# Patient Record
Sex: MALE | Race: WHITE | NOT HISPANIC OR LATINO | Employment: OTHER | URBAN - METROPOLITAN AREA
[De-identification: names, ages, dates, MRNs, and addresses within clinical notes are randomized per-mention and may not be internally consistent; named-entity substitution may affect disease eponyms.]

---

## 2017-03-31 ENCOUNTER — TELEPHONE (OUTPATIENT)
Dept: TRANSPLANT | Facility: CLINIC | Age: 62
End: 2017-03-31

## 2017-03-31 NOTE — TELEPHONE ENCOUNTER
CHRISTIANO Stop [facility].  Recent DX of skin cancer.  Clinic appointment in 1 month.  ___________________  Patient repeated back and voice a understanding of orders.Patient will stop [facility] Now and labs on 4/24/17 with clinic appointment with Dr David on 5/1/17.

## 2017-04-24 ENCOUNTER — TELEPHONE (OUTPATIENT)
Dept: TRANSPLANT | Facility: CLINIC | Age: 62
End: 2017-04-24

## 2017-04-24 ENCOUNTER — DOCUMENTATION ONLY (OUTPATIENT)
Dept: TRANSPLANT | Facility: CLINIC | Age: 62
End: 2017-04-24

## 2017-04-24 LAB
EXT ALBUMIN: 3.9
EXT ALKALINE PHOSPHATASE: 71
EXT ALT: 19
EXT AST: 17
EXT BILIRUBIN DIRECT: 0.2 MG/DL
EXT BILIRUBIN TOTAL: 0.5
EXT BUN: 26
EXT CALCIUM: 9.2
EXT CHLORIDE: 113
EXT CO2: 24
EXT CREATININE: 1.61 MG/DL
EXT EOSINOPHIL%: 1.5
EXT GLUCOSE: 111
EXT HEMATOCRIT: 38.1
EXT HEMOGLOBIN: 12
EXT LYMPH%: 30.7
EXT MAGNESIUM: 1.9
EXT MONOCYTES%: 7.4
EXT PHOSPHORUS: 2.9
EXT PLATELETS: 186
EXT POTASSIUM: 4.5
EXT PROT/CREAT RATIO UR: 0.17
EXT SEGS%: 60.4
EXT SODIUM: 142 MMOL/L
EXT TACROLIMUS LVL: 10.4
EXT WBC: 4.6

## 2017-04-24 NOTE — TELEPHONE ENCOUNTER
Patient repeated back and voice a understanding of orders and states he is presently taking Tacrolimus 1mg po q 12.  Patient will repeat level on 4/26/17.

## 2017-04-24 NOTE — TELEPHONE ENCOUNTER
----- Message from Cherelle Cat MD sent at 4/24/2017  3:27 PM CDT -----  Results reviewed, and action is needed: Please repeat tacrolimus (Prograf) within next week and ensure it is drawn 12 hour after last dose for accuracy.

## 2017-04-26 ENCOUNTER — DOCUMENTATION ONLY (OUTPATIENT)
Dept: TRANSPLANT | Facility: CLINIC | Age: 62
End: 2017-04-26

## 2017-04-26 LAB — EXT TACROLIMUS LVL: 11.4

## 2017-04-26 NOTE — PROGRESS NOTES
Results reviewed, and action is needed: Please decrease Prograf/tacrolimus to 1 mg BID. Repeat level in 2-3 weeks.

## 2017-04-27 DIAGNOSIS — Z94.0 KIDNEY REPLACED BY TRANSPLANT: Primary | ICD-10-CM

## 2017-04-27 RX ORDER — TACROLIMUS 0.5 MG/1
0.5 CAPSULE ORAL EVERY 12 HOURS
Qty: 60 CAPSULE | Refills: 11 | Status: SHIPPED | OUTPATIENT
Start: 2017-04-27 | End: 2017-08-02 | Stop reason: DRUGHIGH

## 2017-04-27 NOTE — TELEPHONE ENCOUNTER
Patient repeated back and voice a understanding of orders. Patient sates he has gabriele taking Tacrolimus 1mg po BID x several months decrease by [place] provider. Re reviewed with Dr paniagua and orders received to decrease Tacrolimus to 0.5mg po BID.Patient repeated back and voice a understanding of orders.Patient will repeat labs 5/9/17.

## 2017-04-27 NOTE — TELEPHONE ENCOUNTER
----- Message from Cherelle Cat MD sent at 4/26/2017  5:44 PM CDT -----  Results reviewed, and action is needed: Please decrease Prograf/tacrolimus to 1 mg BID. Repeat level in 2-3 weeks.

## 2017-05-01 ENCOUNTER — OFFICE VISIT (OUTPATIENT)
Dept: TRANSPLANT | Facility: CLINIC | Age: 62
End: 2017-05-01
Payer: MEDICARE

## 2017-05-01 VITALS
TEMPERATURE: 98 F | WEIGHT: 180.31 LBS | SYSTOLIC BLOOD PRESSURE: 118 MMHG | BODY MASS INDEX: 28.98 KG/M2 | HEIGHT: 66 IN | RESPIRATION RATE: 16 BRPM | OXYGEN SATURATION: 97 % | DIASTOLIC BLOOD PRESSURE: 70 MMHG | HEART RATE: 70 BPM

## 2017-05-01 DIAGNOSIS — Z79.899 IMMUNOSUPPRESSIVE MANAGEMENT ENCOUNTER FOLLOWING KIDNEY TRANSPLANT: ICD-10-CM

## 2017-05-01 DIAGNOSIS — Z94.0 IMMUNOSUPPRESSIVE MANAGEMENT ENCOUNTER FOLLOWING KIDNEY TRANSPLANT: ICD-10-CM

## 2017-05-01 DIAGNOSIS — N18.30 CKD (CHRONIC KIDNEY DISEASE) STAGE 3, GFR 30-59 ML/MIN: Primary | ICD-10-CM

## 2017-05-01 DIAGNOSIS — Z94.0 DECEASED-DONOR KIDNEY TRANSPLANT: Chronic | ICD-10-CM

## 2017-05-01 DIAGNOSIS — C44.99 RECURRENT SKIN CANCER: Chronic | ICD-10-CM

## 2017-05-01 DIAGNOSIS — Z29.89 NEED FOR PROPHYLACTIC IMMUNOTHERAPY: Chronic | ICD-10-CM

## 2017-05-01 PROCEDURE — 99213 OFFICE O/P EST LOW 20 MIN: CPT | Mod: PBBFAC | Performed by: INTERNAL MEDICINE

## 2017-05-01 PROCEDURE — 99215 OFFICE O/P EST HI 40 MIN: CPT | Mod: S$PBB,,, | Performed by: INTERNAL MEDICINE

## 2017-05-01 PROCEDURE — 99999 PR PBB SHADOW E&M-EST. PATIENT-LVL III: CPT | Mod: PBBFAC,,, | Performed by: INTERNAL MEDICINE

## 2017-05-01 NOTE — MR AVS SNAPSHOT
[facility]  [address]  [facility] [address]  Phone: [phone]                  Raymundo Restrepo   2017 2:00 PM   Office Visit    Description:  Male : 1955   Provider:  Cherelle Cat MD   Department:  [facility]           Diagnoses this Visit        Comments    CKD (chronic kidney disease) stage 3, GFR 30-59 ml/min    -  Primary     -donor kidney transplant         Need for prophylactic immunotherapy         Immunosuppressive management encounter following kidney transplant         Recurrent skin cancer                To Do List           Goals (5 Years of Data)     None      [facility]     [facility] -  Assistance  Unless otherwise directed by your provider, please contact [facility], our nurse care line that is available for  assistance.     Registered nurses in the [facility] On Call Center provide: appointment scheduling, clinical advisement, health education, and other advisory services.  Call: [phone] (toll free)               Medications           Message regarding Medications     Verify the changes and/or additions to your medication regime listed below are the same as discussed with your clinician today.  If any of these changes or additions are incorrect, please notify your healthcare provider.             Verify that the below list of medications is an accurate representation of the medications you are currently taking.  If none reported, the list may be blank. If incorrect, please contact your healthcare provider. Carry this list with you in case of emergency.           Current Medications     allopurinol (ZYLOPRIM) 100 MG tablet Take 300 mg by mouth once daily.     benazepril (LOTENSIN) 5 MG tablet Take 1 tablet by mouth Daily.    carvedilol (COREG) 6.25 MG tablet Take 12.5 mg by mouth Twice daily.     clopidogrel (PLAVIX) 75 mg tablet Take 1 tablet by mouth Daily.    multivitamin (THERAGRAN)  "per tablet Take 1 tablet by mouth Daily.    pantoprazole (PROTONIX) 40 MG tablet Take 1 tablet by mouth Daily.    sodium bicarbonate 650 MG tablet Take 650 mg by mouth 2 (two) times daily.    tacrolimus (PROGRAF) 0.5 MG Cap Take 1 capsule (0.5 mg total) by mouth every 12 (twelve) hours.           Clinical Reference Information           Your Vitals Were     BP Pulse Temp Resp Height Weight    118/70 (BP Location: Right arm, Patient Position: Sitting, BP Method: Automatic) 70 97.8 °F (36.6 °C) (Oral) 16 5' 6" (1.676 m) 81.8 kg (180 lb 5.4 oz)    SpO2 BMI             97% 29.11 kg/m2         Blood Pressure          Most Recent Value    BP  118/70      Allergies as of 5/1/2017     Morphine    Iodine      Immunizations Administered on Date of Encounter - 5/1/2017     None      Maintenance Dialysis History     Patient has no recorded history of maintenance dialysis.      Transplant Information        Txp Date Organ Coordinator Care Team    8/28/2010 Kidney Du Monet RN Surgeon:  Kenyon Monet MD   Referring Physician:  Cahd Mayer MD   Current Nephrologist:  Jean Crump MD         [facility] Sign-Up     Activating your [facility] account is as easy as 1-2-3!     1) Visit [email or web address], select Sign Up Now, enter this activation code and your date of birth, then select Next.  [email or web address]  Expires: 6/15/2017  2:46 PM      2) Create a username and password to use when you visit [facility] in the future and select a security question in case you lose your password and select Next.    3) Enter your e-mail address and click Sign Up!    Additional Information  If you have questions, please e-mail [email or web address] or call [phone] to talk to our [facility] staff. Remember, [facility] is NOT to be used for urgent needs. For medical emergencies, dial 911.         Instructions    From Dr. Rea:  It is my privilege to participate in your post-transplant care!  Please be sure to let us know if you have " any questions or concerns about your health care - we cannot help you if we do not know.  Don't forget we are on call 24/7 for any emergencies.   Best Wishes,  Dr. Rona David Coit      Everolimus tablets  What is this medicine?  EVEROLIMUS (leona BE li mus) decreases the activity of your immune system. Afinitor is used to treat certain types of cancer. Zortress is used for kidney and liver transplant rejection prophylaxis.  How should I use this medicine?  Take this medicine by mouth with a full glass of water. Follow the directions on the prescription label. You can take this medicine with or without food, but always take Zortress the same way. Do not cut, crush, or chew this medicine. Do not take with grapefruit juice. Take your medicine at regular intervals. Do not take it more often than directed. Do not stop taking except on your doctor's advice.  Talk to your pediatrician regarding the use of this medicine in children. Special care may be needed.  What side effects may I notice from receiving this medicine?  Side effects that you should report to your doctor or health care professional as soon as possible:  · allergic reactions like skin rash, itching or hives, swelling of the face, lips, or tongue  · breathing problems  · chest pain  · cough  · dark urine  · fever or chills, sore throat  · increased hunger or thirst  · increased urination  · nausea, vomiting  · stomach pain  · swelling of ankles, feet, hands  · trouble passing urine or change in the amount of urine  · unusual bleeding or bruising  · unusually weak or tired  Side effects that usually do not require medical attention (Report these to your doctor or health care professional if they continue or are bothersome.):  · constipation  · diarrhea  · dizziness  · dry mouth or mouth sores  · headache  · nausea, vomiting  What may interact with this medicine?  This medicine may interact with the following medications:  · antiviral medicines for HIV or  AIDS  · aprepitant  · carbamazepine  · certain medicines for cholesterol like simvastatin  · clarithromycin  · cyclosporine  · dexamethasone  · diltiazem  · erythromycin  · fluconazole  · grapefruit juice  · itraconazole  · ketoconazole  · live vaccines  · nefazodone  · nicardipine  · phenobarbital  · phenytoin  · rifabutin  · rifampin  · telithromycin  · verapamil  · voriconazole  What if I miss a dose?  If you miss a dose, take it as soon as you can. If it is almost time for your next dose, take only that dose. Do not take double or extra doses.  Where should I keep my medicine?  Keep out of the reach of children.  Store at room temperature between 15 and 30 degrees C (59 and 86 degrees F). Throw away any unused medicine after the expiration date.  What should I tell my health care provider before I take this medicine?  They need to know if you have any of these conditions:  · diabetes  · heart disease  · high cholesterol  · immune system problems  · infection (especially a virus infection such as chickenpox, cold sores, or herpes)  · kidney disease  · liver disease  · low blood counts, like low white cell, platelet, or red cell counts  · rare hereditary problems of galactose intolerance, the Fortunato lactase deficiency, or glucose-galactose malabsorption  · an unusual or allergic reaction to everolimus, other medicines, foods, dyes, or preservatives  · pregnant or trying to get pregnant  · breast-feeding  What should I watch for while using this medicine?  This drug may make you feel generally unwell. This is not uncommon, as chemotherapy can affect healthy cells as well as cancer cells. Report any side effects. Continue your course of treatment even though you feel ill unless your doctor tells you to stop. Visit your doctor or health care professional for regular check-ups. You may need regular tests to monitor possible side effects of the drug.  This medicine may affect blood sugar levels. If you have diabetes,  check with your doctor or health care professional before you change your diet or the dose of your diabetic medicine.  Do not become pregnant while taking this medicine or for 8 weeks after stopping it. Women should inform their doctor if they wish to become pregnant or think they might be pregnant. There is a potential for serious side effects to an unborn child. Talk to your health care professional or pharmacist for more information. Do not breast-feed an infant while taking this medicine or for 2 weeks after stopping it.  This medicine has caused ovarian failure in some women. This medicine may interfere with the ability to have a child. You should talk with your doctor or health care professional if you are concerned about your fertility.  This medicine has caused reduced sperm counts in some men. This may interfere with the ability to father a child. You should talk to your doctor or health care professional if you are concerned about your fertility.  Call your doctor or health care professional for advice if you get a fever, chills or sore throat, or other symptoms of a cold or flu. Do not treat yourself. This drug decreases your body's ability to fight infections. Try to avoid being around people who are sick.  This medicine may increase your risk to bruise or bleed. Call your doctor or health care professional if you notice any unusual bleeding.  If you have had a kidney transplant, immediately tell your doctor if your incision site is red, warm, or painful. Also, tell your doctor if your incision site opens up or swells or if contains blood, fluid, or pus.  Keep out of the sun. If you cannot avoid being in the sun, wear protective clothing and use sunscreen. Do not use sun lamps or tanning beds/booths.  Date Last Reviewed:   NOTE:This sheet is a summary. It may not cover all possible information. If you have questions about this medicine, talk to your doctor, pharmacist, or health care provider. Copyright©  2016 [employer]             Language Assistance Services     ATTENTION: Language assistance services are available, free of charge. Please call [phone].      ATENCIÓN: Si habla li, tiene a erazo disposición servicios gratuitos de asistencia lingüística. Llame al [phone].     CHÚ Ý: N?u b?n nói Ti?ng Vi?t, có các d?ch v? h? tr? ngôn ng? mi?n phí dành cho b?n. G?i s? [phone].         [facility] complies with applicable Federal civil rights laws and does not discriminate on the basis of race, color, national origin, age, disability, or sex.

## 2017-05-01 NOTE — PATIENT INSTRUCTIONS
From Dr. Rea:  It is my privilege to participate in your post-transplant care!  Please be sure to let us know if you have any questions or concerns about your health care - we cannot help you if we do not know.  Don't forget we are on call 24/7 for any emergencies.   Best Wishes,  Dr. Rona David Coit      Everolimus tablets  What is this medicine?  EVEROLIMUS (leona BE li mus) decreases the activity of your immune system. Afinitor is used to treat certain types of cancer. Zortress is used for kidney and liver transplant rejection prophylaxis.  How should I use this medicine?  Take this medicine by mouth with a full glass of water. Follow the directions on the prescription label. You can take this medicine with or without food, but always take Zortress the same way. Do not cut, crush, or chew this medicine. Do not take with grapefruit juice. Take your medicine at regular intervals. Do not take it more often than directed. Do not stop taking except on your doctor's advice.  Talk to your pediatrician regarding the use of this medicine in children. Special care may be needed.  What side effects may I notice from receiving this medicine?  Side effects that you should report to your doctor or health care professional as soon as possible:  · allergic reactions like skin rash, itching or hives, swelling of the face, lips, or tongue  · breathing problems  · chest pain  · cough  · dark urine  · fever or chills, sore throat  · increased hunger or thirst  · increased urination  · nausea, vomiting  · stomach pain  · swelling of ankles, feet, hands  · trouble passing urine or change in the amount of urine  · unusual bleeding or bruising  · unusually weak or tired  Side effects that usually do not require medical attention (Report these to your doctor or health care professional if they continue or are bothersome.):  · constipation  · diarrhea  · dizziness  · dry mouth or mouth sores  · headache  · nausea, vomiting  What may  interact with this medicine?  This medicine may interact with the following medications:  · antiviral medicines for HIV or AIDS  · aprepitant  · carbamazepine  · certain medicines for cholesterol like simvastatin  · clarithromycin  · cyclosporine  · dexamethasone  · diltiazem  · erythromycin  · fluconazole  · grapefruit juice  · itraconazole  · ketoconazole  · live vaccines  · nefazodone  · nicardipine  · phenobarbital  · phenytoin  · rifabutin  · rifampin  · telithromycin  · verapamil  · voriconazole  What if I miss a dose?  If you miss a dose, take it as soon as you can. If it is almost time for your next dose, take only that dose. Do not take double or extra doses.  Where should I keep my medicine?  Keep out of the reach of children.  Store at room temperature between 15 and 30 degrees C (59 and 86 degrees F). Throw away any unused medicine after the expiration date.  What should I tell my health care provider before I take this medicine?  They need to know if you have any of these conditions:  · diabetes  · heart disease  · high cholesterol  · immune system problems  · infection (especially a virus infection such as chickenpox, cold sores, or herpes)  · kidney disease  · liver disease  · low blood counts, like low white cell, platelet, or red cell counts  · rare hereditary problems of galactose intolerance, the Fortunato lactase deficiency, or glucose-galactose malabsorption  · an unusual or allergic reaction to everolimus, other medicines, foods, dyes, or preservatives  · pregnant or trying to get pregnant  · breast-feeding  What should I watch for while using this medicine?  This drug may make you feel generally unwell. This is not uncommon, as chemotherapy can affect healthy cells as well as cancer cells. Report any side effects. Continue your course of treatment even though you feel ill unless your doctor tells you to stop. Visit your doctor or health care professional for regular check-ups. You may need regular  tests to monitor possible side effects of the drug.  This medicine may affect blood sugar levels. If you have diabetes, check with your doctor or health care professional before you change your diet or the dose of your diabetic medicine.  Do not become pregnant while taking this medicine or for 8 weeks after stopping it. Women should inform their doctor if they wish to become pregnant or think they might be pregnant. There is a potential for serious side effects to an unborn child. Talk to your health care professional or pharmacist for more information. Do not breast-feed an infant while taking this medicine or for 2 weeks after stopping it.  This medicine has caused ovarian failure in some women. This medicine may interfere with the ability to have a child. You should talk with your doctor or health care professional if you are concerned about your fertility.  This medicine has caused reduced sperm counts in some men. This may interfere with the ability to father a child. You should talk to your doctor or health care professional if you are concerned about your fertility.  Call your doctor or health care professional for advice if you get a fever, chills or sore throat, or other symptoms of a cold or flu. Do not treat yourself. This drug decreases your body's ability to fight infections. Try to avoid being around people who are sick.  This medicine may increase your risk to bruise or bleed. Call your doctor or health care professional if you notice any unusual bleeding.  If you have had a kidney transplant, immediately tell your doctor if your incision site is red, warm, or painful. Also, tell your doctor if your incision site opens up or swells or if contains blood, fluid, or pus.  Keep out of the sun. If you cannot avoid being in the sun, wear protective clothing and use sunscreen. Do not use sun lamps or tanning beds/booths.  Date Last Reviewed:   NOTE:This sheet is a summary. It may not cover all possible  information. If you have questions about this medicine, talk to your doctor, pharmacist, or health care provider. Copyright© 2016 [employer]

## 2017-05-01 NOTE — LETTER
May 1, 2017        Jean Crump  [address]  [place] [address]  Phone: [phone]  Fax: [phone]             [facility]  [address]  [facility] [address]  Phone: [phone]   Patient: Raymundo Restrepo   MR Number: [id number]   YOB: 1955   Date of Visit: 5/1/2017       Dear Dr. Jean Crump    Thank you for referring Raymundo Restrepo to me for evaluation. Attached you will find relevant portions of my assessment and plan of care.    If you have questions, please do not hesitate to call me. I look forward to following Raymundo Restrepo along with you.    Sincerely,    Cherelle Cat MD    Enclosure    If you would like to receive this communication electronically, please contact [email or web address] or [phone] to request [employer] Link access.    [employer] Link is a tool which provides read-only access to select patient information with whom you have a relationship. Its easy to use and provides real time access to review your patients record including encounter summaries, notes, results, and demographic information.    If you feel you have received this communication in error or would no longer like to receive these types of communications, please e-mail [email or web address]

## 2017-05-01 NOTE — PROGRESS NOTES
Kidney Post-Transplant Assessment    Referring Physician: Chad Mayer  Current Nephrologist: Jean Crump    ORGAN: RIGHT KIDNEY  Donor Type:  - brain death  PHS Increased Risk: no  Cold Ischemia: 814.2 mins  Induction Medications: steroids (prednisone,methylprednisolone,solumedrol,medrol,decadron), campath - alemtuzumab (anti-cd52)    Subjective:     CC:  Reassessment of renal allograft function and management of chronic immunosuppression.    HPI:  Mr. Restrepo is a 62 y.o. year old White male who received a  - brain death kidney transplant on 8/28/10.  He has CKD stage 3 - GFR 30-59 and his baseline creatinine is ~ 1.6s. He takes tacrolimus for maintenance immunosuppression, having recently stopped his mycophenolate given recurrent skin lesions. He denies any recent hospitalizations or ER visits since his previous clinic visit.    Pertinent History: Induced w Campath 30 mg IV intraoperatively & SoluMedrol 875 mg total over 3 days. After transplant he has had coronary in-stent restenosis requiring restenting in . He was also diagnosed with ischemic colitis 2010.  Recurrent skin cancers of various types, reportedly even melanoma, have required multiple skin lesions to be removed.    Raymundo feels well today, but is concerned about his recent discontinuation of CellCept.  On reviewing records it appears that about a month ago his nephrologist contacted our office to report that he is having increased frequency of skin lesions removed.  Based on that information, his CellCept was discontinued.  He reports today that he would rather have more skin lesions removed and risk his kidney transplant.    He otherwise feels well, and has no other complaints. He denies any kidney-related complaints, such as pain over allograft, flank pain, dysuria, frequency, or other LUTS.  Of note, a recent elevated tacrolimus level warranted decreasing his dose to 1 mg twice daily (which was not  "reflected in Epic).  Several days ago he was further reduced to 0.5 mg twice a day.    Review of Systems   Constitutional: Negative.  Negative for fatigue.   HENT: Negative.    Eyes: Negative for visual disturbance.   Respiratory: Negative for shortness of breath.    Cardiovascular: Negative for chest pain.   Gastrointestinal: Negative for abdominal pain.   Genitourinary: Negative for difficulty urinating.   Musculoskeletal: Positive for arthralgias (Fingers). Negative for joint swelling.   Skin: Negative for rash.        See HPI   Allergic/Immunologic: Positive for immunocompromised state.   Neurological: Negative for tremors and headaches.   Hematological: Does not bruise/bleed easily.   Psychiatric/Behavioral: Negative.      Objective:   Blood pressure 118/70, pulse 70, temperature 97.8 °F (36.6 °C), temperature source Oral, resp. rate 16, height 5' 6" (1.676 m), weight 81.8 kg (180 lb 5.4 oz), SpO2 97 %.body mass index is 29.11 kg/(m^2).    Physical Exam   Constitutional: He is oriented to person, place, and time. He appears well-developed and well-nourished.   HENT:   Head: Normocephalic and atraumatic.   Mouth/Throat: No oropharyngeal exudate.   Eyes: Conjunctivae are normal. No scleral icterus.   Neck: Neck supple. No JVD present.   Cardiovascular: Normal rate and regular rhythm.    Pulmonary/Chest: Effort normal and breath sounds normal.   Abdominal: Soft. He exhibits no mass. There is no hepatosplenomegaly. There is no tenderness. There is no CVA tenderness. No hernia.   Musculoskeletal: He exhibits no edema.   Neurological: He is alert and oriented to person, place, and time. Coordination normal.   Skin: Skin is warm and dry. No rash noted.   Multiple well-healed scars noted in both upper extremities, consistent with the removal of prior skin lesions by dermatology.   Psychiatric: He has a normal mood and affect. Judgment normal.     Labs:  Lab Results   Component Value Date    EXTANC  09/08/2014      " Comment:      Coming to Clinic on 9/15/14    EXTWBC 4.6 2017    EXTSEGS 60.4 2017    EXTPLATELETS 186 2017    EXTHEMOGLOBI 12.0 (L) 2017    EXTHEMATOCRI 38.1 (L) 2017    EXTCREATININ 1.61 (H) 2017    EXTSODIUM 142 2017    EXTPOTASSIUM 4.5 2017    EXTBUN 26 (H) 2017    EXTCO2 24 2017    EXTCALCIUM 9.2 2017    EXTPHOSPHORU 2.9 2017    EXTGLUCOSE 111 (H) 2017    EXTALBUMIN 3.9 2017    EXTAST 17 2017    EXTALT 19 2017    EXTBILITOTAL 0.5 2017     Lab Results   Component Value Date    EXTTACROLVL 11.4 2017    EXTPROTCRE 0.17 2017    EXTPROTEINUA neg 2016    EXTWBCUA 1-4 2016    EXTRBCUA 0-2 2016     Labs were reviewed with the patient.    Assessment:     1. CKD (chronic kidney disease) stage 3, GFR 30-59 ml/min    2. -donor kidney transplant 8/28/10    3. Chronic immunosuppression with Prograf and Cellcept    4. Immunosuppressive management encounter following kidney transplant    5. Recurrent skin cancer        Plan:   CKD3 s/p kidney transplantation. Continue to monitor renal function and electrolytes, HTN, secondary hyperparathyroidism and other issues related to underlying ESRD.   Continue tacrolimus-based immunosuppression-await repeat level from 17 to determine if his current dose is acceptable to maintain at trough level of approximately 5.  Will continue to watch signs, symptoms and levels for side effects and drug toxicity.  We had a lengthy discussion regarding his recurrent skin cancers.  He is well aware that immunosuppression can aggravate this, but is anxious to not jeopardize his kidney function.  He was asking if he could restart his CellCept.  I suggested that before we go that route, we should check immune cell function test to determine his net immunosuppression given his tacrolimus dose was reduced recently.  I have also requested we obtain a lipid profile  indicates he is converted to an mTOR inhibitor.    We discussed he needs aggressive resection of the skin lesions, which he now believes his dermatologist is reluctant to pursue given a recent episode of bleeding.  I advised him to discuss this with his dermatologist and perhaps his cardiologist given his Plavix therapy.  I explained that our transplant dermatologists often suggest discontinuing CNI therapy and substituting mTOR inhibitor.  Once these labs are resulted and he has had the opportunity to review some written information provided about everolimus, we can again discuss if he wants to change his immunosuppression.  I reviewed the side effects of this new immunosuppressant and pointed out them more significant ones are hyperlipidemia, neutropenia and poor wound healing.  As such, I strongly encouraged him to have any skin lesions that need attention addressed prior to the conversion, if he opts to pursue this option.  The option to see transplant dermatologist was offered.    Lipid panel and immune cell saw function will be ordered on May 4 along with repeat tacrolimus level.  He is aware with for him to continue holding his CellCept until we get these results back.  During that time, he will consider more aggressive treatment of his skin lesions and whether he wants to try everolimus.  Well over 75% of this visit was spent on counseling, approximately 30 minutes out of the 40 minute visit.    Follow-up:   Annual follow-up with kidney transplant clinic with repeat labs, including renal function panel with UA, urine protein/creatinine ratio, and drug trough level q3 months.  Patient also reminded to follow-up with general nephrologist.    Cherelle Cat MD       Education:   Material provided to the patient.  Patient reminded to call with any health changes since these can be early signs of significant complications.  Also, I advised the patient to be sure any new medications or changes of old  medications are discussed with either a pharmacist or physician knowledgeable with transplant to avoid rejection/drug toxicity related to significant drug interactions.    UNOS Patient Status  Functional Status: 80% - Normal activity with effort: some symptoms of disease  Physical Capacity: No Limitations

## 2017-05-09 LAB
EXT ALBUMIN: 3.9
EXT BUN: 25
EXT CALCIUM: 9
EXT CHLORIDE: 115
EXT CHOLESTEROL: 138
EXT CO2: 23
EXT CREATININE: 1.71 MG/DL
EXT EOSINOPHIL%: 1.8
EXT GFR MDRD NON AF AMER: 41
EXT GLUCOSE: 111
EXT HDL: 24
EXT HEMATOCRIT: 37.7
EXT HEMOGLOBIN: 11.8
EXT IMMUNKNOW (STIMULATED): 148
EXT LDL CHOLESTEROL: 83
EXT LYMPH%: 30.8
EXT MAGNESIUM: 2
EXT MONOCYTES%: 8.4
EXT PLATELETS: 163
EXT POTASSIUM: 4.8
EXT SEGS%: 59
EXT SODIUM: 141 MMOL/L
EXT TACROLIMUS LVL: 4.1
EXT TRIGLYCERIDES: 155
EXT WBC: 3.8

## 2017-05-11 ENCOUNTER — DOCUMENTATION ONLY (OUTPATIENT)
Dept: TRANSPLANT | Facility: CLINIC | Age: 62
End: 2017-05-11

## 2017-08-02 ENCOUNTER — DOCUMENTATION ONLY (OUTPATIENT)
Dept: TRANSPLANT | Facility: CLINIC | Age: 62
End: 2017-08-02

## 2017-08-02 DIAGNOSIS — Z94.0 KIDNEY REPLACED BY TRANSPLANT: Primary | ICD-10-CM

## 2017-08-02 LAB
EXT ALBUMIN: 3.8
EXT ALKALINE PHOSPHATASE: 72
EXT ALT: 15
EXT AST: 18
EXT BILIRUBIN DIRECT: 0.2 MG/DL
EXT BILIRUBIN TOTAL: 0.6
EXT BUN: 29
EXT CALCIUM: 9.2
EXT CHLORIDE: 113
EXT CO2: 22
EXT CREATININE: 1.59 MG/DL
EXT EOSINOPHIL%: 2.4
EXT GFR MDRD NON AF AMER: 44
EXT GLUCOSE: 116
EXT HEMATOCRIT: 36.9
EXT HEMOGLOBIN: 11.4
EXT LYMPH%: 30.8
EXT MAGNESIUM: 2.1
EXT MONOCYTES%: 8.5
EXT PHOSPHORUS: 3.4
EXT PLATELETS: 184
EXT POTASSIUM: 4.5
EXT PROT/CREAT RATIO UR: 0.08
EXT SEGS%: 58.3
EXT SODIUM: 140 MMOL/L
EXT TACROLIMUS LVL: <1.5
EXT WBC: 5

## 2017-08-02 RX ORDER — TACROLIMUS 1 MG/1
1 CAPSULE ORAL EVERY 12 HOURS
COMMUNITY
Start: 2017-04-14 | End: 2017-08-02 | Stop reason: SDUPTHER

## 2017-08-02 RX ORDER — TACROLIMUS 1 MG/1
3 CAPSULE ORAL EVERY 12 HOURS
Qty: 180 CAPSULE | Refills: 11 | Status: SHIPPED | OUTPATIENT
Start: 2017-08-02 | End: 2017-08-09 | Stop reason: SDUPTHER

## 2017-08-02 NOTE — PROGRESS NOTES
Results reviewed, and action is needed: Please increase Prograf/tacrolimus to 3 mg BID. Repeat labs Friday.

## 2017-08-02 NOTE — TELEPHONE ENCOUNTER
VORB Increase Tacrolimus to 3mg po q 12 hours.  8/2/17 labs reviewed.  __________________  Patient repeated back and voice a understanding of orders. 12 hour trough reviewed. Patient to repeat labs on 8/4/17.

## 2017-08-04 ENCOUNTER — DOCUMENTATION ONLY (OUTPATIENT)
Dept: TRANSPLANT | Facility: CLINIC | Age: 62
End: 2017-08-04

## 2017-08-04 ENCOUNTER — TELEPHONE (OUTPATIENT)
Dept: TRANSPLANT | Facility: CLINIC | Age: 62
End: 2017-08-04

## 2017-08-04 LAB
EXT BUN: 30
EXT CALCIUM: 9.8
EXT CHLORIDE: 116
EXT CO2: 18
EXT CREATININE: 1.53 MG/DL
EXT GFR MDRD NON AF AMER: 47
EXT GLUCOSE: 112
EXT MAGNESIUM: 1.8
EXT PHOSPHORUS: 2.9
EXT POTASSIUM: 4.3
EXT SODIUM: 140 MMOL/L
EXT TACROLIMUS LVL: 3.8

## 2017-08-04 NOTE — TELEPHONE ENCOUNTER
----- Message from Cherelle Cat MD sent at 8/4/2017  2:11 PM CDT -----  Results reviewed, and action is needed: tacro better; repeat next week. Acidosis inconsistent with prior lab thus will repeat before making med changes as suspect processing issue.

## 2017-08-04 NOTE — PROGRESS NOTES
Results reviewed, and action is needed: tacro better; repeat next week. Acidosis inconsistent with prior lab thus will repeat before making med changes as suspect processing issue.

## 2017-08-04 NOTE — TELEPHONE ENCOUNTER
Patient repeated back and voice a understanding of orders. Patient to repeat fasting labs on 8/7/17.Patient voice a understanding of medication compliance.

## 2017-08-07 LAB
EXT ALBUMIN: 3.7
EXT BUN: 29
EXT CALCIUM: 9.1
EXT CHLORIDE: 117
EXT CO2: 20
EXT CREATININE: 1.74 MG/DL
EXT GFR MDRD AF AMER: 49
EXT GFR MDRD NON AF AMER: 40
EXT GLUCOSE: 117
EXT MAGNESIUM: 1.7
EXT PHOSPHORUS: 2.6
EXT POTASSIUM: 4.3
EXT SODIUM: 142 MMOL/L
EXT TACROLIMUS LVL: 8.5

## 2017-08-08 ENCOUNTER — DOCUMENTATION ONLY (OUTPATIENT)
Dept: TRANSPLANT | Facility: CLINIC | Age: 62
End: 2017-08-08

## 2017-08-09 ENCOUNTER — OFFICE VISIT (OUTPATIENT)
Dept: TRANSPLANT | Facility: CLINIC | Age: 62
End: 2017-08-09
Payer: MEDICARE

## 2017-08-09 VITALS
WEIGHT: 174.63 LBS | RESPIRATION RATE: 18 BRPM | OXYGEN SATURATION: 100 % | TEMPERATURE: 98 F | BODY MASS INDEX: 28.06 KG/M2 | SYSTOLIC BLOOD PRESSURE: 140 MMHG | HEART RATE: 69 BPM | HEIGHT: 66 IN | DIASTOLIC BLOOD PRESSURE: 80 MMHG

## 2017-08-09 DIAGNOSIS — Z94.0 KIDNEY REPLACED BY TRANSPLANT: Primary | ICD-10-CM

## 2017-08-09 DIAGNOSIS — Z94.0 DECEASED-DONOR KIDNEY TRANSPLANT: Primary | Chronic | ICD-10-CM

## 2017-08-09 DIAGNOSIS — Z94.0 KIDNEY REPLACED BY TRANSPLANT: ICD-10-CM

## 2017-08-09 DIAGNOSIS — Q61.3 PKD (POLYCYSTIC KIDNEY DISEASE): ICD-10-CM

## 2017-08-09 DIAGNOSIS — I15.0 RENOVASCULAR HYPERTENSION: Chronic | ICD-10-CM

## 2017-08-09 DIAGNOSIS — Z29.89 NEED FOR PROPHYLACTIC IMMUNOTHERAPY: Chronic | ICD-10-CM

## 2017-08-09 DIAGNOSIS — C43.9 MALIGNANT MELANOMA, UNSPECIFIED SITE: Chronic | ICD-10-CM

## 2017-08-09 DIAGNOSIS — C44.99 RECURRENT SKIN CANCER: Chronic | ICD-10-CM

## 2017-08-09 DIAGNOSIS — N18.2 CKD (CHRONIC KIDNEY DISEASE) STAGE 2, GFR 60-89 ML/MIN: ICD-10-CM

## 2017-08-09 PROCEDURE — 3077F SYST BP >= 140 MM HG: CPT | Mod: ,,, | Performed by: NURSE PRACTITIONER

## 2017-08-09 PROCEDURE — 99215 OFFICE O/P EST HI 40 MIN: CPT | Mod: PBBFAC | Performed by: NURSE PRACTITIONER

## 2017-08-09 PROCEDURE — 99999 PR PBB SHADOW E&M-EST. PATIENT-LVL V: CPT | Mod: PBBFAC,,, | Performed by: NURSE PRACTITIONER

## 2017-08-09 PROCEDURE — 3079F DIAST BP 80-89 MM HG: CPT | Mod: ,,, | Performed by: NURSE PRACTITIONER

## 2017-08-09 PROCEDURE — 99214 OFFICE O/P EST MOD 30 MIN: CPT | Mod: S$PBB,,, | Performed by: NURSE PRACTITIONER

## 2017-08-09 RX ORDER — ASPIRIN 325 MG
325 TABLET ORAL DAILY
COMMUNITY
End: 2018-08-13

## 2017-08-09 RX ORDER — TACROLIMUS 1 MG/1
CAPSULE ORAL
Qty: 210 CAPSULE | Refills: 11 | Status: SHIPPED | OUTPATIENT
Start: 2017-08-09 | End: 2017-08-17 | Stop reason: SDUPTHER

## 2017-08-09 RX ORDER — MYCOPHENOLATE MOFETIL 250 MG/1
500 CAPSULE ORAL 2 TIMES DAILY
COMMUNITY
End: 2017-08-09 | Stop reason: SDUPTHER

## 2017-08-09 RX ORDER — MYCOPHENOLATE MOFETIL 250 MG/1
250 CAPSULE ORAL 2 TIMES DAILY
Qty: 60 CAPSULE | Refills: 11 | Status: SHIPPED | OUTPATIENT
Start: 2017-08-09 | End: 2018-08-13 | Stop reason: SDUPTHER

## 2017-08-09 NOTE — LETTER
August 9, 2017        Jean Crump  [address]  [place] [address]  Phone: [phone]  Fax: [phone]             [facility]  [address]  [facility] [address]  Phone: [phone]   Patient: Raymundo Restrepo   MR Number: [id number]   YOB: 1955   Date of Visit: 8/9/2017       Dear Dr. Jean Crump    Thank you for referring Raymundo Restrepo to me for evaluation. Attached you will find relevant portions of my assessment and plan of care.    If you have questions, please do not hesitate to call me. I look forward to following Raymundo Restrepo along with you.    Sincerely,    Krystle Paul, NP    Enclosure    If you would like to receive this communication electronically, please contact [email or web address] or [phone] to request [employer] Link access.    [employer] Link is a tool which provides read-only access to select patient information with whom you have a relationship. Its easy to use and provides real time access to review your patients record including encounter summaries, notes, results, and demographic information.    If you feel you have received this communication in error or would no longer like to receive these types of communications, please e-mail [email or web address]

## 2017-08-09 NOTE — PROGRESS NOTES
"   Kidney Post-Transplant Assessment    Referring Physician: Chad Mayer  Current Nephrologist: Jean Crump    ORGAN: RIGHT KIDNEY  Donor Type:  - brain death  PHS Increased Risk: no  Cold Ischemia: 814.2 mins  Induction Medications: steroids (prednisone,methylprednisolone,solumedrol,medrol,decadron), campath - alemtuzumab (anti-cd52)    Subjective:     CC:  Reassessment of renal allograft function and management of chronic immunosuppression.    HPI:  Mr. Restrepo is a 62 y.o. year old White male who received a  - brain death kidney transplant on 8/28/10.  He has CKD stage 2 - GFR 60-89 and his baseline creatinine is between  1.2-1.5. He takes prednisone and tacrolimus for maintenance immunosuppression. MMF was stopped in 3/2017 d/t skin cancer/ recurrent melanoma, but the patient reports he restarted it 2 weeks later and has been on  mg BID.  He follows up with dermatology in [place]. He reports  He has had melanoma lesions removed from his arms and has active lesions to the face that could not be removed d/t bleeding and Plavix use. He f/u with dermatology/ Dr Pineda  3x/year.     He denies any recent hospitalizations or ER visits since his previous clinic visit.  Also reports having massive chest pain 3 days ago. He took an aspirin and felt better. He f/u with a cardiologist Dr Koroma and next apt scheduled for 10/2017  He also reports a bradycardic episode in 2017 that almost result in getting "shocked."  He does not have chest pain today    Past Medical History:   Diagnosis Date    CAD (coronary artery disease), native coronary artery      Stent placed 2007 In-stent restenosis, required a 2nd stent 2010    -donor kidney transplant 2010    Gross hematuria     history of gross hematuria    Hiatal hernia     Hypertension 9/15/2014    Ischemic colitis     Kidney stones     Need for prophylactic immunotherapy     PKD (polycystic kidney " "disease)     Recurrent skin cancer     Renal hypertension     Ruptured cyst of kidney     Seizure     Skin cancer     TIA (transient ischemic attack)        Review of Systems   Constitutional: Negative for activity change, appetite change, chills, fatigue, fever and unexpected weight change.   HENT: Negative for congestion, facial swelling, postnasal drip, rhinorrhea, sinus pressure, sore throat and trouble swallowing.    Eyes: Negative for pain, redness and visual disturbance.   Respiratory: Negative for cough, chest tightness, shortness of breath and wheezing.    Cardiovascular: Positive for chest pain. Negative for palpitations and leg swelling.        Chest pain 3 days ago   Gastrointestinal: Negative for abdominal pain, diarrhea, nausea and vomiting.   Genitourinary: Negative for dysuria, flank pain and urgency.   Musculoskeletal: Negative for gait problem, neck pain and neck stiffness.   Skin: Negative for rash.        Recurrent melanoma    Allergic/Immunologic: Positive for immunocompromised state. Negative for environmental allergies and food allergies.   Neurological: Negative for dizziness, weakness, light-headedness and headaches.   Hematological: Bruises/bleeds easily.        Plavix , ASA   Psychiatric/Behavioral: Negative for agitation and confusion. The patient is not nervous/anxious.        Objective:     Blood pressure (!) 140/80, pulse 69, temperature 98 °F (36.7 °C), temperature source Oral, resp. rate 18, height 5' 6" (1.676 m), weight 79.2 kg (174 lb 9.7 oz), SpO2 100 %.body mass index is 28.18 kg/m².    Physical Exam   Constitutional: He is oriented to person, place, and time. He appears well-developed and well-nourished.   HENT:   Head: Normocephalic.   Mouth/Throat: Oropharynx is clear and moist. No oropharyngeal exudate.   Eyes: Conjunctivae and EOM are normal. Pupils are equal, round, and reactive to light. No scleral icterus.   Neck: Normal range of motion. Neck supple. "   Cardiovascular: Normal rate, regular rhythm and normal heart sounds.    Pulmonary/Chest: Effort normal and breath sounds normal.   Abdominal: Soft. Normal appearance and bowel sounds are normal. He exhibits no distension and no mass. There is no splenomegaly or hepatomegaly. There is no tenderness. There is no rebound, no guarding, no CVA tenderness, no tenderness at McBurney's point and negative Gaston's sign.       Musculoskeletal: Normal range of motion. He exhibits no edema.   Lymphadenopathy:     He has no cervical adenopathy.   Neurological: He is alert and oriented to person, place, and time. He exhibits normal muscle tone. Coordination normal.   Skin: Skin is warm and dry.   Psychiatric: He has a normal mood and affect. His behavior is normal.   Vitals reviewed.      Labs:  Lab Results   Component Value Date    WBC 5.11 08/15/2016    HGB 11.6 (L) 08/15/2016    HCT 36.8 (L) 08/15/2016     08/15/2016    K 4.8 08/15/2016     (H) 08/15/2016    CO2 21 (L) 08/15/2016    BUN 26 (H) 08/15/2016    CREATININE 1.4 08/15/2016    EGFRNONAA 54.2 (A) 08/15/2016    CALCIUM 10.0 08/15/2016    PHOS 3.4 08/15/2016    MG 1.9 08/15/2016    ALBUMIN 3.7 08/15/2016    AST 19 08/05/2011    ALT 22 08/05/2011    UTPCR Unable to calculate 09/15/2014    TACROLIMUS 2.6 (L) 08/05/2013       Lab Results   Component Value Date    EXTANC  09/08/2014      Comment:      Coming to Clinic on 9/15/14    EXTWBC 5.0 08/02/2017    EXTSEGS 58.3 08/02/2017    EXTPLATELETS 184 08/02/2017    EXTHEMOGLOBI 11.4 (L) 08/02/2017    EXTHEMATOCRI 36.9 (L) 08/02/2017    EXTCREATININ 1.53 (H) 08/04/2017    EXTSODIUM 140 08/04/2017    EXTPOTASSIUM 4.3 08/04/2017    EXTBUN 30 (H) 08/04/2017    EXTCO2 18 (L) 08/04/2017    EXTCALCIUM 9.8 08/04/2017    EXTPHOSPHORU 2.9 08/04/2017    EXTGLUCOSE 112 (H) 08/04/2017    EXTALBUMIN 3.8 08/02/2017    EXTAST 18 08/02/2017    EXTALT 15 08/02/2017    EXTBILITOTAL 0.6 08/02/2017       Lab Results   Component Value  Date    EXTTACROLVL 3.8 (B) 2017    EXTPROTCRE 0.08 2017    EXTPROTEINUA neg 2016    EXTWBCUA 1-4 2016    EXTRBCUA 0-2 2016       Labs were reviewed with the patient.    Assessment:     1. -donor kidney transplant 8/28/10    2. Chronic immunosuppression with Prograf and Cellcept    3. Renovascular hypertension    4. PKD (polycystic kidney disease)    5. CKD (chronic kidney disease) stage 2, GFR 60-89 ml/min    6. Recurrent skin cancer    7. Kidney replaced by transplant    8. Malignant melanoma, unspecified site        Plan:   cylex   Repeat trough   Repeat renal fx panel ---adequate hydration encouraged     Low CO2-->will reevaluate with next lab draw. If CO2 remains low will increase  sodium bicarb to  1300 mg BID     Decrease MMF to 250 mg BID--> discussed at length with patient concerning increased risk of continuing to take MMF with  Active melanoma lesions to his face.   Patient verbalizes understanding but still wishes to continue MMF therapy  Will decrease dose to 250 mg QD and   refer to transplant derm for further eval and consult concerning this.     ED precautions reviewed c/o chest pain, f/u with cardiology ASAP    Follow-up:   1. CKD stage: 2 stable    2. Immunosuppression:   Prograf trough 3.8, which is sub therapeutic target 5-7. Continue Prograf 4/3,  mg BID , and Prednisone 5 mg QD  Bactrim 400/80 mg QD for PCP prophylaxis. Will continue to monitor for drug toxicities    3. Allograft Function: stable,  Continue good po hydration.        EXTCREATININ 1.53 (H) 2017       4. Hypertension management: advise low salt diet and home BP monitoring    Lotensin 5 mg QD, coreg 12.5 mg BID  5. Metabolic Bone Disease/Secondary Hyperparathyroidism:stable  Will monitor PTH, CA and Vit D/guidelines     EXTCALCIUM 9.8 2017   EXTPHOSPHORU 2.9 2017     6. Electrolytes: Normal calcium. Bicarbonate is  Low--will reevaluate with next lab draw  Sodium bicarb  650 mg BID    EXTCREATININ 1.53 (H) 08/04/2017   EXTSODIUM 140 08/04/2017   EXTPOTASSIUM 4.3 08/04/2017   EXTBUN 30 (H) 08/04/2017   EXTCO2 18 (L) 08/04/2017       7. Anemia: stable. No need for intervention      EXTWBC 5.0 08/02/2017   EXTSEGS 58.3 08/02/2017   EXTPLATELETS 184 08/02/2017   EXTHEMOGLOBI 11.4 (L) 08/02/2017   EXTHEMATOCRI 36.9 (L) 08/02/2017       8.  Cytopenias: no significant cytopenias will monitor as per our guidelines. Medicine list reviewed including potential causes of drug-induced cytopenias    9.Proteinuria: continue p/c ratio as per guidelines     Lab Results   Component Value Date    EXTTACROLVL 3.8 (B) 08/04/2017    EXTPROTCRE 0.08 08/02/2017    EXTPROTEINUA neg 08/09/2016    EXTWBCUA 1-4 08/09/2016    EXTRBCUA 0-2 08/09/2016     10. BK virus infection screening:  will continue to monitor/ guidelines    11. Weight education: provided during the clinic visit   Body mass index is 28.18 kg/m².          12.Patient safety education regarding immunosuppression including prophylaxis posttransplant for CMV, PCP : Education provided about vaccination and prevention of infections          Follow-up:   Clinic: return to transplant clinic weekly for the first month after transplant; every 2 weeks during months 2-3; then at 6-, 9-, 12-, 18-, 24-, and 36- months post-transplant to reassess for complications from immunosuppression toxicity and monitor for rejection.  Annually thereafter.    Labs: since patient remains at high risk for rejection and drug-related complications that warrant close monitoring, labs will be ordered as follows: continue twice weekly CBC, renal panel, and drug level for first month; then same labs once weekly through 3rd month post-transplant.  Urine for UA and protein/creatinine ratio monthly.  Urine BK - PCR at 1-, 3-, 6-, 9-, 12-, 18-, 24-, and 36- months post-transplant.  Hepatic panel at 1-, 2-, 3-, 6-, 9-, 12-, 18-, 24-, and 36- months post-transplant.    Krystle Paul,  NP       Education:   Material provided to the patient.  Patient reminded to call with any health changes since these can be early signs of significant complications.  Also, I advised the patient to be sure any new medications or changes of old medications are discussed with either a pharmacist or physician knowledgeable with transplant to avoid rejection/drug toxicity related to significant drug interactions.    UNOS Patient Status  Functional Status: 80% - Normal activity with effort: some symptoms of disease  Physical Capacity: No Limitations

## 2017-08-11 ENCOUNTER — DOCUMENTATION ONLY (OUTPATIENT)
Dept: TRANSPLANT | Facility: CLINIC | Age: 62
End: 2017-08-11

## 2017-08-14 ENCOUNTER — DOCUMENTATION ONLY (OUTPATIENT)
Dept: TRANSPLANT | Facility: CLINIC | Age: 62
End: 2017-08-14

## 2017-08-15 LAB
EXT ALBUMIN: 4
EXT BUN: 31
EXT CALCIUM: 9.5
EXT CHLORIDE: 116
EXT CO2: 20
EXT CREATININE: 1.71 MG/DL
EXT GFR MDRD NON AF AMER: 41
EXT GLUCOSE: 109
EXT IMMUNKNOW (STIMULATED): 361
EXT MAGNESIUM: 1.7
EXT POTASSIUM: 4.6
EXT SODIUM: 142 MMOL/L
EXT TACROLIMUS LVL: 12.4

## 2017-08-15 NOTE — PROGRESS NOTES
Results reviewed, and action is needed: Please clarify what dose he was taking at time this med was drawn 8/7 (looks like we went up on 8/9 based on labs from 8/4??).   Also check that there are no recent med changes (start, stop or change meds) that could affect level.    Lastly, any chance he can get drawn for faster turnaround? The delays are complicating this issue a lot.

## 2017-08-16 ENCOUNTER — DOCUMENTATION ONLY (OUTPATIENT)
Dept: TRANSPLANT | Facility: CLINIC | Age: 62
End: 2017-08-16

## 2017-08-17 DIAGNOSIS — Z94.0 KIDNEY REPLACED BY TRANSPLANT: ICD-10-CM

## 2017-08-17 NOTE — TELEPHONE ENCOUNTER
VORB Hold Tacrolimus tonight 8/17/17 and tomorrow am 8/18/17.  Restart Tacrolimus 2mg po q 12 hours in the PM on 8/18/17.  8/15/17 Tacro level reviewed.  Next labs 8/22/17.  _________________  Patient repeated back and voice a understanding of orders.

## 2017-08-18 RX ORDER — TACROLIMUS 1 MG/1
2 CAPSULE ORAL EVERY 12 HOURS
Qty: 120 CAPSULE | Refills: 11 | Status: SHIPPED | OUTPATIENT
Start: 2017-08-18 | End: 2018-08-13 | Stop reason: SDUPTHER

## 2017-08-22 LAB
EXT BUN: 42
EXT CALCIUM: 9.9
EXT CHLORIDE: 115
EXT CO2: 21
EXT CREATININE: 1.91 MG/DL
EXT EOSINOPHIL%: 1.7
EXT GFR MDRD NON AF AMER: 36
EXT GLUCOSE: 125
EXT HEMATOCRIT: 35.7
EXT HEMOGLOBIN: 11.5
EXT LYMPH%: 31.8
EXT MAGNESIUM: 2
EXT MONOCYTES%: 9.5
EXT PHOSPHORUS: 3.3
EXT PLATELETS: 163
EXT POTASSIUM: 4.9
EXT SEGS%: 57
EXT SODIUM: 141 MMOL/L
EXT TACROLIMUS LVL: 7.4
EXT WBC: 4.7

## 2017-08-24 ENCOUNTER — DOCUMENTATION ONLY (OUTPATIENT)
Dept: TRANSPLANT | Facility: CLINIC | Age: 62
End: 2017-08-24

## 2017-08-24 ENCOUNTER — TELEPHONE (OUTPATIENT)
Dept: TRANSPLANT | Facility: CLINIC | Age: 62
End: 2017-08-24

## 2017-08-24 DIAGNOSIS — Z94.0 KIDNEY REPLACED BY TRANSPLANT: Primary | ICD-10-CM

## 2017-08-24 NOTE — TELEPHONE ENCOUNTER
Patient repeated back and voice a understanding of orders. Patient will repeat labs here on 9/11/17.

## 2017-08-24 NOTE — TELEPHONE ENCOUNTER
----- Message from Cherelle Cat MD sent at 8/24/2017  4:12 PM CDT -----  Available results reviewed and require no immediate action.

## 2017-09-11 ENCOUNTER — INITIAL CONSULT (OUTPATIENT)
Dept: DERMATOLOGY | Facility: CLINIC | Age: 62
End: 2017-09-11
Payer: MEDICARE

## 2017-09-11 ENCOUNTER — LAB VISIT (OUTPATIENT)
Dept: LAB | Facility: HOSPITAL | Age: 62
End: 2017-09-11
Attending: INTERNAL MEDICINE
Payer: MEDICARE

## 2017-09-11 VITALS — BODY MASS INDEX: 28.08 KG/M2 | WEIGHT: 174 LBS

## 2017-09-11 DIAGNOSIS — L57.0 ACTINIC KERATOSES: ICD-10-CM

## 2017-09-11 DIAGNOSIS — Z94.0 HISTORY OF KIDNEY TRANSPLANT: ICD-10-CM

## 2017-09-11 DIAGNOSIS — L81.4 LENTIGINES: ICD-10-CM

## 2017-09-11 DIAGNOSIS — Z94.0 KIDNEY REPLACED BY TRANSPLANT: ICD-10-CM

## 2017-09-11 DIAGNOSIS — Z85.828 HISTORY OF SKIN CANCER: ICD-10-CM

## 2017-09-11 DIAGNOSIS — D48.5 NEOPLASM OF UNCERTAIN BEHAVIOR OF SKIN: Primary | ICD-10-CM

## 2017-09-11 LAB
ALBUMIN SERPL BCP-MCNC: 3.6 G/DL
ANION GAP SERPL CALC-SCNC: 5 MMOL/L
BASOPHILS # BLD AUTO: 0 K/UL
BASOPHILS NFR BLD: 0 %
BUN SERPL-MCNC: 34 MG/DL
CALCIUM SERPL-MCNC: 8.8 MG/DL
CHLORIDE SERPL-SCNC: 116 MMOL/L
CO2 SERPL-SCNC: 22 MMOL/L
CREAT SERPL-MCNC: 1.8 MG/DL
DIFFERENTIAL METHOD: ABNORMAL
EOSINOPHIL # BLD AUTO: 0.1 K/UL
EOSINOPHIL NFR BLD: 1.3 %
ERYTHROCYTE [DISTWIDTH] IN BLOOD BY AUTOMATED COUNT: 14.5 %
EST. GFR  (AFRICAN AMERICAN): 45.9 ML/MIN/1.73 M^2
EST. GFR  (NON AFRICAN AMERICAN): 39.7 ML/MIN/1.73 M^2
GLUCOSE SERPL-MCNC: 107 MG/DL
HCT VFR BLD AUTO: 34.6 %
HGB BLD-MCNC: 11.7 G/DL
LYMPHOCYTES # BLD AUTO: 1.7 K/UL
LYMPHOCYTES NFR BLD: 36.2 %
MAGNESIUM SERPL-MCNC: 1.8 MG/DL
MCH RBC QN AUTO: 29 PG
MCHC RBC AUTO-ENTMCNC: 33.8 G/DL
MCV RBC AUTO: 86 FL
MONOCYTES # BLD AUTO: 0.3 K/UL
MONOCYTES NFR BLD: 5.9 %
NEUTROPHILS # BLD AUTO: 2.7 K/UL
NEUTROPHILS NFR BLD: 56.4 %
PHOSPHATE SERPL-MCNC: 2.5 MG/DL
PLATELET # BLD AUTO: 170 K/UL
PMV BLD AUTO: 10.7 FL
POTASSIUM SERPL-SCNC: 4.8 MMOL/L
RBC # BLD AUTO: 4.04 M/UL
SODIUM SERPL-SCNC: 143 MMOL/L
TACROLIMUS BLD-MCNC: 5.4 NG/ML
WBC # BLD AUTO: 4.78 K/UL

## 2017-09-11 PROCEDURE — 11101 PR BIOPSY, EACH ADDED LESION: CPT | Mod: 59,PBBFAC | Performed by: DERMATOLOGY

## 2017-09-11 PROCEDURE — 99212 OFFICE O/P EST SF 10 MIN: CPT | Mod: PBBFAC | Performed by: DERMATOLOGY

## 2017-09-11 PROCEDURE — 11100 PR BIOPSY OF SKIN LESION: CPT | Mod: 59,PBBFAC | Performed by: DERMATOLOGY

## 2017-09-11 PROCEDURE — 17000 DESTRUCT PREMALG LESION: CPT | Mod: S$PBB,,, | Performed by: DERMATOLOGY

## 2017-09-11 PROCEDURE — 11100 PR BIOPSY OF SKIN LESION: CPT | Mod: 59,S$PBB,, | Performed by: DERMATOLOGY

## 2017-09-11 PROCEDURE — 17003 DESTRUCT PREMALG LES 2-14: CPT | Mod: S$PBB,,, | Performed by: DERMATOLOGY

## 2017-09-11 PROCEDURE — 88305 TISSUE EXAM BY PATHOLOGIST: CPT | Mod: 59 | Performed by: PATHOLOGY

## 2017-09-11 PROCEDURE — 17003 DESTRUCT PREMALG LES 2-14: CPT | Mod: PBBFAC | Performed by: DERMATOLOGY

## 2017-09-11 PROCEDURE — 99203 OFFICE O/P NEW LOW 30 MIN: CPT | Mod: 25,S$PBB,, | Performed by: DERMATOLOGY

## 2017-09-11 PROCEDURE — 85025 COMPLETE CBC W/AUTO DIFF WBC: CPT

## 2017-09-11 PROCEDURE — 80197 ASSAY OF TACROLIMUS: CPT

## 2017-09-11 PROCEDURE — 11101 PR BIOPSY, EACH ADDED LESION: CPT | Mod: 59,S$PBB,, | Performed by: DERMATOLOGY

## 2017-09-11 PROCEDURE — 80069 RENAL FUNCTION PANEL: CPT

## 2017-09-11 PROCEDURE — 83735 ASSAY OF MAGNESIUM: CPT

## 2017-09-11 PROCEDURE — 36415 COLL VENOUS BLD VENIPUNCTURE: CPT

## 2017-09-11 PROCEDURE — 86352 CELL FUNCTION ASSAY W/STIM: CPT

## 2017-09-11 PROCEDURE — 99999 PR PBB SHADOW E&M-EST. PATIENT-LVL II: CPT | Mod: PBBFAC,,, | Performed by: DERMATOLOGY

## 2017-09-11 PROCEDURE — 17000 DESTRUCT PREMALG LESION: CPT | Mod: PBBFAC | Performed by: DERMATOLOGY

## 2017-09-11 RX ORDER — BENAZEPRIL HYDROCHLORIDE 5 MG/1
TABLET ORAL
COMMUNITY
Start: 2016-10-18 | End: 2018-08-13 | Stop reason: SDUPTHER

## 2017-09-11 RX ORDER — CARVEDILOL 3.12 MG/1
TABLET ORAL
COMMUNITY
Start: 2017-04-14 | End: 2019-09-24 | Stop reason: SDUPTHER

## 2017-09-11 RX ORDER — ALLOPURINOL 300 MG/1
TABLET ORAL
COMMUNITY
Start: 2017-01-18 | End: 2018-08-13 | Stop reason: SDUPTHER

## 2017-09-11 RX ORDER — MULTIVITAMIN
1 TABLET ORAL
COMMUNITY
End: 2018-08-13 | Stop reason: SDUPTHER

## 2017-09-11 RX ORDER — TACROLIMUS 1 MG/1
CAPSULE ORAL
COMMUNITY
Start: 2017-06-14 | End: 2018-08-13 | Stop reason: SDUPTHER

## 2017-09-11 RX ORDER — SODIUM BICARBONATE 325 MG/1
325 TABLET ORAL
COMMUNITY
End: 2018-08-13 | Stop reason: SDUPTHER

## 2017-09-11 RX ORDER — MYCOPHENOLATE MOFETIL 250 MG/1
CAPSULE ORAL
COMMUNITY
Start: 2017-01-18 | End: 2018-08-02 | Stop reason: SDUPTHER

## 2017-09-11 RX ORDER — PANTOPRAZOLE SODIUM 40 MG/1
TABLET, DELAYED RELEASE ORAL
COMMUNITY
Start: 2017-01-18 | End: 2018-08-13 | Stop reason: SDUPTHER

## 2017-09-11 RX ORDER — CLOPIDOGREL BISULFATE 75 MG/1
75 TABLET ORAL
COMMUNITY
Start: 2017-04-13 | End: 2018-08-13 | Stop reason: SDUPTHER

## 2017-09-11 NOTE — LETTER
September 11, 2017      Cherelle Cat MD  [address]  [facility] [address]           [facility]  [address]  [facility] [address]  Phone: [phone]  Fax: [phone]          Patient: Raymundo Restrepo   MR Number: [id number]   YOB: 1955   Date of Visit: 9/11/2017       Dear Dr. Cherelle Cat:    Thank you for referring Raymundo Restrepo to me for evaluation. Attached you will find relevant portions of my assessment and plan of care.    If you have questions, please do not hesitate to call me. I look forward to following Raymundo Restrepo along with you.    Sincerely,    Tamara Moody MD    Enclosure  CC:  No Recipients    If you would like to receive this communication electronically, please contact [email or web address] or [phone] to request more information on [employer] Link access.    For providers and/or their staff who would like to refer a patient to [facility], please contact us through our one-stop-shop provider referral line, [facility], at [phone].    If you feel you have received this communication in error or would no longer like to receive these types of communications, please e-mail [email or web address]

## 2017-09-11 NOTE — PROGRESS NOTES
"  Subjective:       Patient ID:  Raymundo Restrepo is a 62 y.o. male who presents for   Chief Complaint   Patient presents with    Skin Check     TBSE    Spot     left forearm     Lesion     left wrist      HPI 61 yo M with a history of skin cancer, polycystic kidney disease, kidney transplant (; on prednisone, tacrolimus, MMF), CAD, HTN, and ischemic colitis here for initial evaluation. Referred to us for second opinion for management of skin cancers while on immunosuppressants. He sees Dr. Pineda in [place] and patient reports that he is reluctant to surgically treat remaining skin cancers as patient had issues with bleeding after last procedure. Per patient, he has been told he has had several melanomas. He says he has been treated with surgical procedures involving "digging and scraping", but has not had any procedures requiring stitches.  Most recently, he has two spots on his R cheek present for many months that have been treated but returned.  He also c/o a spot on his L lateral neck, present for months, tender, no previous attempted treatment.    Past Medical History:   Diagnosis Date    CAD (coronary artery disease), native coronary artery      Stent placed 2007 In-stent restenosis, required a 2nd stent 2010    -donor kidney transplant 2010    Gross hematuria     history of gross hematuria    Hiatal hernia     Hypertension 9/15/2014    Ischemic colitis     Kidney stones     Need for prophylactic immunotherapy     PKD (polycystic kidney disease)     Recurrent skin cancer     Renal hypertension     Ruptured cyst of kidney     Seizure     Skin cancer     TIA (transient ischemic attack)      Review of Systems   Constitutional: Negative for fever, chills and weight loss.   Skin: Negative for daily sunscreen use and activity-related sunscreen use.   Hematologic/Lymphatic: Bruises/bleeds easily.        On Plavix        Objective:    Physical Exam   Constitutional: " He appears well-developed and well-nourished.   Neurological: He is alert.   Psychiatric: He has a normal mood and affect.   Skin:   Areas Examined (abnormalities noted in diagram):   Scalp / Hair Palpated and Inspected  Head / Face Inspection Performed  Neck Inspection Performed  Chest / Axilla Inspection Performed  Abdomen Inspection Performed  Back Inspection Performed  RUE Inspected  LUE Inspection Performed  RLE Inspected  LLE Inspection Performed  Nails and Digits Inspection Performed                   Diagram Legend     Erythematous scaling macule/papule c/w actinic keratosis       Vascular papule c/w angioma      Pigmented verrucoid papule/plaque c/w seborrheic keratosis      Yellow umbilicated papule c/w sebaceous hyperplasia      Irregularly shaped tan macule c/w lentigo     1-2 mm smooth white papules consistent with Milia      Movable subcutaneous cyst with punctum c/w epidermal inclusion cyst      Subcutaneous movable cyst c/w pilar cyst      Firm pink to brown papule c/w dermatofibroma      Pedunculated fleshy papule(s) c/w skin tag(s)      Evenly pigmented macule c/w junctional nevus     Mildly variegated pigmented, slightly irregular-bordered macule c/w mildly atypical nevus      Flesh colored to evenly pigmented papule c/w intradermal nevus       Pink pearly papule/plaque c/w basal cell carcinoma      Erythematous hyperkeratotic cursted plaque c/w SCC      Surgical scar with no sign of skin cancer recurrence      Open and closed comedones      Inflammatory papules and pustules      Verrucoid papule consistent consistent with wart     Erythematous eczematous patches and plaques     Dystrophic onycholytic nail with subungual debris c/w onychomycosis     Umbilicated papule    Erythematous-base heme-crusted tan verrucoid plaque consistent with inflamed seborrheic keratosis     Erythematous Silvery Scaling Plaque c/w Psoriasis     See annotation                    Assessment / Plan:      Pathology  Orders:      Normal Orders This Visit    Tissue Specimen To Pathology, Dermatology     Questions:    Directional Terms:  Other(comment)    Clinical information:  recurrent SCC vs BCC    Specific Site:  R preauricular cheek, superior    Tissue Specimen To Pathology, Dermatology     Questions:    Directional Terms:  Other(comment)    Clinical information:  recurrent SCC vs BCC    Specific Site:  R preauricular cheek, inferior    Tissue Specimen To Pathology, Dermatology     Questions:    Directional Terms:  Other(comment)    Clinical information:  SCC vs BCC vs AK    Specific Site:  L neck    Tissue Specimen To Pathology, Dermatology     Questions:    Directional Terms:  Other(comment)    Clinical information:  SCC vs BCC vs AK    Specific Site:  L posterior neck    Tissue Specimen To Pathology, Dermatology     Questions:    Directional Terms:  Other(comment)    Clinical information:  SCC vs BCC    Specific Site:  L dorsal wrist        Neoplasm of uncertain behavior of skin- R preauricular cheek, superior  - recurrent BCC vs SCC  - shave biopsy performed as below    Shave biopsy procedure note:    Shave biopsy performed after verbal consent including risk of infection, scar, recurrence, need for additional treatment of site. Area prepped with alcohol, anesthetized with approximately 1.0cc of 1% lidocaine with epinephrine. Lesional tissue shaved with razor blade. Hemostasis achieved with application of aluminum chloride followed by hyfrecation. No complications. Dressing applied. Wound care explained.      Neoplasm of uncertain behavior of skin- R preauricular cheek, inferior  - recurrent BCC vs SCC  - shave biopsy performed as below    Shave biopsy procedure note:    Shave biopsy performed after verbal consent including risk of infection, scar, recurrence, need for additional treatment of site. Area prepped with alcohol, anesthetized with approximately 1.0cc of 1% lidocaine with epinephrine. Lesional tissue shaved  with razor blade. Hemostasis achieved with application of aluminum chloride followed by hyfrecation. No complications. Dressing applied. Wound care explained.    Neoplasm of uncertain behavior of skin- L neck  - BCC vs SCC  - shave biopsy performed as below    Shave biopsy procedure note:    Shave biopsy performed after verbal consent including risk of infection, scar, recurrence, need for additional treatment of site. Area prepped with alcohol, anesthetized with approximately 1.0cc of 1% lidocaine with epinephrine. Lesional tissue shaved with razor blade. Hemostasis achieved with application of aluminum chloride followed by hyfrecation. No complications. Dressing applied. Wound care explained.    Neoplasm of uncertain behavior of skin- L posterior neck  - BCC vs SCC vs AK  - shave biopsy performed as below    Shave biopsy procedure note:    Shave biopsy performed after verbal consent including risk of infection, scar, recurrence, need for additional treatment of site. Area prepped with alcohol, anesthetized with approximately 1.0cc of 1% lidocaine with epinephrine. Lesional tissue shaved with razor blade. Hemostasis achieved with application of aluminum chloride followed by hyfrecation. No complications. Dressing applied. Wound care explained.    Neoplasm of uncertain behavior of skin- L dorsal wrist  - BCC vs SCC vs AK  - shave biopsy performed as below    Shave biopsy procedure note:    Shave biopsy performed after verbal consent including risk of infection, scar, recurrence, need for additional treatment of site. Area prepped with alcohol, anesthetized with approximately 1.0cc of 1% lidocaine with epinephrine. Lesional tissue shaved with razor blade. Hemostasis achieved with application of aluminum chloride followed by hyfrecation. No complications. Dressing applied. Wound care explained.    History of skin cancer  - Areas of previous skin cancers examined. Most sites well healed with no signs of recurrence;  sites suspicious for recurrence noted above and biopsied.    Total body skin examination performed today including at least 12 points as noted in physical examination. Lesions suspicious for malignancy noted above and biopsied.    History of kidney transplant  - TBSE as above. Discussed sun precautions.  - Will consider phototherapy in the future    Actinic keratoses  - Cryosurgery Procedure Note    Verbal consent from the patient is obtained and the patient is aware of the precancerous quality and need for treatment of these lesions. Liquid nitrogen cryosurgery is applied to the 8 actinic keratoses, as detailed in the physical exam, to produce a freeze injury. The patient is aware that blisters may form and is instructed on wound care with gentle cleansing and use of vaseline ointment to keep moist until healed. The patient is supplied a handout on cryosurgery and is instructed to call if lesions do not completely resolve.    Reported personal history of melanoma  - TBSE performed as above, no evidence of lesions suspicious for melanoma. Unclear history as treatment for previous skin cancers does not sound consistent with diagnosis of melanoma. Will request records from Dr. Pineda in [place]           Return for pending Pathology.

## 2017-09-13 ENCOUNTER — TELEPHONE (OUTPATIENT)
Dept: TRANSPLANT | Facility: CLINIC | Age: 62
End: 2017-09-13

## 2017-09-13 LAB — IMMUNKNOW (STIMULATED): 293 NG/ML

## 2017-09-13 NOTE — TELEPHONE ENCOUNTER
----- Message from Cherelle Cat MD sent at 9/13/2017  3:54 PM CDT -----  Results reviewed, and action is needed: skin path shows multiple squamous cell skin cancers. After he finished with derm (desi will get larger resections, he needs to come back to clinic to discuss role of IS in caner and what options he has). He needs p/c ratio and lipid profile in advance of that appt please.

## 2017-09-13 NOTE — PROGRESS NOTES
Results reviewed, and action is needed: skin path shows multiple squamous cell skin cancers. After he finished with derm (desi will get larger resections, he needs to come back to clinic to discuss role of IS in caner and what options he has). He needs p/c ratio and lipid profile in advance of that appt please.

## 2017-09-13 NOTE — TELEPHONE ENCOUNTER
Patient repeated back and voice a understanding of orders ,Patient to call coordinator after he is completed with Derm to schedule follow up appoint with Dr paniagua .

## 2017-10-19 ENCOUNTER — TELEPHONE (OUTPATIENT)
Dept: DERMATOLOGY | Facility: CLINIC | Age: 62
End: 2017-10-19

## 2017-10-19 NOTE — TELEPHONE ENCOUNTER
Returned pt call, no answer. Left message.    ----- Message from Cathy Atkinson sent at 10/19/2017 10:19 AM CDT -----  Contact: self  Pt stated that he needs to reschedule his procedure that is scheduled for tomorrow.  He can be reached at [phone]

## 2017-10-23 ENCOUNTER — TELEPHONE (OUTPATIENT)
Dept: DERMATOLOGY | Facility: CLINIC | Age: 62
End: 2017-10-23

## 2017-10-23 NOTE — TELEPHONE ENCOUNTER
Spoke with pt, scheduled appointment and mailed reminder.     ----- Message from Jyoti Fallon MA sent at 10/20/2017  4:56 PM CDT -----  Tabatha PADILLA Staff  Caller: Pt (Yesterday,  3:44 PM)         Pt needs to r/s appt says he missed Umm call     Pt can be reached at [phone]     Thanks

## 2017-12-01 ENCOUNTER — PROCEDURE VISIT (OUTPATIENT)
Dept: DERMATOLOGY | Facility: CLINIC | Age: 62
End: 2017-12-01
Payer: MEDICARE

## 2017-12-01 DIAGNOSIS — D09.9 SQUAMOUS CELL CARCINOMA IN SITU: Primary | ICD-10-CM

## 2017-12-01 PROCEDURE — 17271 DSTR MAL LES S/N/H/F/G 0.6-1: CPT | Mod: PBBFAC | Performed by: DERMATOLOGY

## 2017-12-01 PROCEDURE — 99499 UNLISTED E&M SERVICE: CPT | Mod: S$PBB,,, | Performed by: DERMATOLOGY

## 2017-12-01 PROCEDURE — 17271 DSTR MAL LES S/N/H/F/G 0.6-1: CPT | Mod: S$PBB,,, | Performed by: DERMATOLOGY

## 2017-12-01 RX ORDER — FLUOROURACIL 50 MG/G
CREAM TOPICAL
Qty: 40 G | Refills: 0 | Status: SHIPPED | OUTPATIENT
Start: 2017-12-01 | End: 2018-08-13

## 2017-12-01 NOTE — PROGRESS NOTES
Subjective:       Patient ID:  Raymundo Restrepo is a 62 y.o. male who presents for   Chief Complaint   Patient presents with    Skin Cancer     HPI  63 yo M presents for skin cancer treatment.  He prefers not to treat the 2 spots on his face since he will be at a football game this weekend and does not want to bother with bandages    Past Medical History:   Diagnosis Date    CAD (coronary artery disease), native coronary artery      Stent placed 2007 In-stent restenosis, required a 2nd stent 2010    -donor kidney transplant 2010    Gross hematuria     history of gross hematuria    Hiatal hernia     Hypertension 9/15/2014    Ischemic colitis     Kidney stones     Need for prophylactic immunotherapy     PKD (polycystic kidney disease)     Recurrent skin cancer     Renal hypertension     Ruptured cyst of kidney     Seizure     Skin cancer     TIA (transient ischemic attack)      Review of Systems   Constitutional: Negative for fever, chills and weight loss.   Skin: Negative for daily sunscreen use and activity-related sunscreen use.   Hematologic/Lymphatic: Bruises/bleeds easily.        On Plavix        Objective:    Physical Exam   Skin:   Areas Examined (abnormalities noted in diagram):   Head / Face Inspection Performed  Neck Inspection Performed  LUE Inspection Performed                   Diagram Legend    See annotation    PATHOLOGY 17  Skin, right preauricular cheek, superior, shave biopsy:  - HYPERTROPHIC ACTINIC KERATOSIS    Skin, right preauricular cheek, inferior, shave biopsy:  - SQUAMOUS CELL CARCINOMA IN SITU.  - THE TUMOR FOCALLY EXTENDS TO A LATERAL BIOPSY MARGIN.    Skin, left neck, shave biopsy:  - SQUAMOUS CELL CARCINOMA IN SITU.  - THE TUMOR EXTENDS TO THE LATERAL MARGINS.    Skin, left posterior neck, shave biopsy:  - SQUAMOUS CELL CARCINOMA IN SITU.  - THE TUMOR EXTENDS TO A LATERAL BIOPSY MARGIN.    Skin, left dorsal wrist, shave biopsy:  - SQUAMOUS  CELL CARCINOMA IN SITU.  - MARGINS ARE NEGATIVE IN THE PLANES OF SECTION    Assessment / Plan:        Squamous cell carcinoma in situ  -     fluorouracil (EFUDEX) 5 % cream; Apply to R cheek and posterior neck twice daily x 4 weeks  Dispense: 40 g; Refill: 0    Here for electrodesiccation and curettage of SCCIS on the L dorsal wrist and L neck. bx done on 9/11/17:  Electrodessication and Curettage Procedure note:  Verbal consent obtained. Lesional tissue marked and prepped with alcohol. Lesion anesthetized with 1% lidocaine with epinephrine. Curettage and Desiccation x 3 cycles to base. Aluminum chloride for hemostasis. Lesion size after primary curettage: 7 mm; 1 cm    Area bandaged and wound care explained.    He prefers to treat the lesion on his face and posterior neck topically with Efudex         Return in about 3 months (around 3/1/2018).

## 2017-12-06 ENCOUNTER — TELEPHONE (OUTPATIENT)
Dept: TRANSPLANT | Facility: CLINIC | Age: 62
End: 2017-12-06

## 2017-12-06 NOTE — TELEPHONE ENCOUNTER
Received phone call from patient stating he is schedule to see dermatology in January to remove more skin lesions. Patient to keep coordinator posted.

## 2018-08-02 RX ORDER — MYCOPHENOLATE MOFETIL 250 MG/1
CAPSULE ORAL
Qty: 60 CAPSULE | Refills: 11 | Status: SHIPPED | OUTPATIENT
Start: 2018-08-02 | End: 2018-08-13 | Stop reason: SDUPTHER

## 2018-08-10 ENCOUNTER — DOCUMENTATION ONLY (OUTPATIENT)
Dept: TRANSPLANT | Facility: CLINIC | Age: 63
End: 2018-08-10

## 2018-08-10 LAB
EXT ALBUMIN: 4.1
EXT ALKALINE PHOSPHATASE: 71
EXT ALT: 14
EXT AST: 19
EXT BACTERIA UA: 0
EXT BILIRUBIN DIRECT: 0.3 MG/DL
EXT BILIRUBIN TOTAL: 0.6
EXT BUN: 24
EXT CALCIUM: 9.4
EXT CHLORIDE: 112
EXT CHOLESTEROL (LIPID PANEL): 176
EXT CO2: 25
EXT CREATININE: 1.45 MG/DL
EXT EOSINOPHIL%: 1.9
EXT GFR MDRD NON AF AMER: 49
EXT GLUCOSE UA: ABNORMAL
EXT GLUCOSE: 116
EXT HDL: 31
EXT HEMATOCRIT: 36.8
EXT HEMOGLOBIN: 11.8
EXT LDL CHOLESTEROL: 115
EXT LYMPH%: 28.1
EXT MAGNESIUM: 2
EXT MONOCYTES%: 7.9
EXT NITRITES UA: ABNORMAL
EXT PHOSPHORUS: 3
EXT PLATELETS: 157
EXT POTASSIUM: 4.8
EXT PROT/CREAT RATIO UR: 0.11
EXT PROTEIN TOTAL: 6.8
EXT PROTEIN UA: ABNORMAL
EXT RBC UA: ABNORMAL
EXT SEGS%: 61.9
EXT SODIUM: 140 MMOL/L
EXT TACROLIMUS LVL: 4.6
EXT TRIGLYCERIDES: 152
EXT WBC UA: ABNORMAL
EXT WBC: 4.7

## 2018-08-13 ENCOUNTER — OFFICE VISIT (OUTPATIENT)
Dept: TRANSPLANT | Facility: CLINIC | Age: 63
End: 2018-08-13
Payer: MEDICARE

## 2018-08-13 VITALS
DIASTOLIC BLOOD PRESSURE: 83 MMHG | OXYGEN SATURATION: 98 % | RESPIRATION RATE: 18 BRPM | WEIGHT: 171.94 LBS | SYSTOLIC BLOOD PRESSURE: 157 MMHG | HEART RATE: 64 BPM | BODY MASS INDEX: 27.63 KG/M2 | TEMPERATURE: 98 F | HEIGHT: 66 IN

## 2018-08-13 DIAGNOSIS — Z29.89 NEED FOR PROPHYLACTIC IMMUNOTHERAPY: Chronic | ICD-10-CM

## 2018-08-13 DIAGNOSIS — Z94.0 KIDNEY REPLACED BY TRANSPLANT: ICD-10-CM

## 2018-08-13 DIAGNOSIS — N18.30 STAGE 3 CHRONIC KIDNEY DISEASE: Chronic | ICD-10-CM

## 2018-08-13 DIAGNOSIS — Q61.3 PKD (POLYCYSTIC KIDNEY DISEASE): ICD-10-CM

## 2018-08-13 DIAGNOSIS — Z94.0 DECEASED-DONOR KIDNEY TRANSPLANT: Primary | Chronic | ICD-10-CM

## 2018-08-13 DIAGNOSIS — C44.99 RECURRENT SKIN CANCER: Chronic | ICD-10-CM

## 2018-08-13 DIAGNOSIS — C43.9 MALIGNANT MELANOMA, UNSPECIFIED SITE: Chronic | ICD-10-CM

## 2018-08-13 DIAGNOSIS — I15.0 RENOVASCULAR HYPERTENSION: Chronic | ICD-10-CM

## 2018-08-13 PROCEDURE — 99999 PR PBB SHADOW E&M-EST. PATIENT-LVL IV: CPT | Mod: PBBFAC,,, | Performed by: NURSE PRACTITIONER

## 2018-08-13 PROCEDURE — 99215 OFFICE O/P EST HI 40 MIN: CPT | Mod: S$PBB,,, | Performed by: NURSE PRACTITIONER

## 2018-08-13 PROCEDURE — 99214 OFFICE O/P EST MOD 30 MIN: CPT | Mod: PBBFAC | Performed by: NURSE PRACTITIONER

## 2018-08-13 RX ORDER — TACROLIMUS 1 MG/1
2 CAPSULE ORAL EVERY 12 HOURS
Qty: 120 CAPSULE | Refills: 11 | Status: SHIPPED | OUTPATIENT
Start: 2018-08-13 | End: 2018-10-01 | Stop reason: SDUPTHER

## 2018-08-13 RX ORDER — MYCOPHENOLATE MOFETIL 250 MG/1
250 CAPSULE ORAL 2 TIMES DAILY
Qty: 60 CAPSULE | Refills: 11 | Status: SHIPPED | OUTPATIENT
Start: 2018-08-13 | End: 2019-09-03 | Stop reason: SDUPTHER

## 2018-08-13 RX ORDER — AMLODIPINE BESYLATE 2.5 MG/1
2.5 TABLET ORAL
Status: ON HOLD | COMMUNITY
Start: 2018-05-04 | End: 2020-12-30 | Stop reason: HOSPADM

## 2018-08-13 NOTE — PROGRESS NOTES
Kidney Post-Transplant Assessment    Referring Physician: Chad Mayer  Current Nephrologist: Jean Crump    ORGAN: RIGHT KIDNEY  Donor Type:  - brain death  PHS Increased Risk: no  Cold Ischemia: 814.2 mins  Induction Medications: steroids (prednisone,methylprednisolone,solumedrol,medrol,decadron), campath - alemtuzumab (anti-cd52)    Subjective:     CC:  Reassessment of renal allograft function and management of chronic immunosuppression.    HPI:  Mr. Restrepo is a 63 y.o. year old White male who received a  - brain death kidney transplant on 8/28/10.  He has CKD stage 3 - GFR 30-59 and his baseline creatinine is between 1.2-1.5. He takes mycophenolate mofetil and tacrolimus for maintenance immunosuppression. He denies any recent hospitalizations or ER visits since his previous clinic visit.    Reports 2 TIAs in one night in  . He f/u with his cardiologist. Had a work up that was reported as normal. He wore a holter monitor for a month and is scheduled to f/u soon. He is on Plavix daily. He continues to walk a 12 every evening. He continues to f/u with Derm every 6 months. He reports melanoma with lesion removals. He continues to take MMF despite c/o about recurrent Melanoma. He would prefer to continue MMF and not have his kidney fail,  Despite the risk of melanoma reoccurrence. We discussed stopping MMF if melanoma  Becomes  Invasive,  requiring a more extensive removal. MOHs.  He verbalized understanding.     Off note: He has been [facility] and [place] with his wife. He reports his grandaughter is hospitalized in [place] with cancer and is currently fighting a CMV which has infiltrate to the lungs. She is currently intubated.       Past Medical History:   Diagnosis Date    CAD (coronary artery disease), native coronary artery      Stent placed 2007 In-stent restenosis, required a 2nd stent 2010    -donor kidney transplant 2010    Gross hematuria      "history of gross hematuria    Hiatal hernia     Hypertension 9/15/2014    Ischemic colitis     Kidney stones     Need for prophylactic immunotherapy     PKD (polycystic kidney disease)     Recurrent skin cancer     Renal hypertension     Ruptured cyst of kidney     Seizure     Skin cancer     TIA (transient ischemic attack)        Review of Systems   Constitutional: Negative for activity change, appetite change, chills, fatigue, fever and unexpected weight change.   HENT: Negative for congestion, facial swelling, postnasal drip, rhinorrhea, sinus pressure, sore throat and trouble swallowing.    Eyes: Negative for pain, redness and visual disturbance.   Respiratory: Negative for cough, chest tightness, shortness of breath and wheezing.    Cardiovascular: Negative.  Negative for chest pain, palpitations and leg swelling.   Gastrointestinal: Negative for abdominal pain, diarrhea, nausea and vomiting.   Genitourinary: Negative for dysuria, flank pain and urgency.   Musculoskeletal: Negative for gait problem, neck pain and neck stiffness.   Skin: Negative for rash.   Allergic/Immunologic: Positive for immunocompromised state. Negative for environmental allergies and food allergies.   Neurological: Negative for dizziness, weakness, light-headedness and headaches.   Psychiatric/Behavioral: Negative for agitation and confusion. The patient is not nervous/anxious.        Objective:   Blood pressure (!) 157/83, pulse 64, temperature 98.3 °F (36.8 °C), temperature source Oral, resp. rate 18, height 5' 6" (1.676 m), weight 78 kg (171 lb 15.3 oz), SpO2 98 %.body mass index is 27.75 kg/m².    Physical Exam   Constitutional: He is oriented to person, place, and time. He appears well-developed and well-nourished.   HENT:   Head: Normocephalic.   Mouth/Throat: Oropharynx is clear and moist. No oropharyngeal exudate.   Eyes: Conjunctivae and EOM are normal. Pupils are equal, round, and reactive to light. No scleral " icterus.   Neck: Normal range of motion. Neck supple.   Cardiovascular: Normal rate, regular rhythm and normal heart sounds.   Pulmonary/Chest: Effort normal and breath sounds normal.   Abdominal: Soft. Normal appearance and bowel sounds are normal. He exhibits no distension and no mass. There is no splenomegaly or hepatomegaly. There is no tenderness. There is no rebound, no guarding, no CVA tenderness, no tenderness at McBurney's point and negative Gaston's sign.       Musculoskeletal: Normal range of motion. He exhibits no edema.   Lymphadenopathy:     He has no cervical adenopathy.   Neurological: He is alert and oriented to person, place, and time. He exhibits normal muscle tone. Coordination normal.   Skin: Skin is warm and dry.   Psychiatric: He has a normal mood and affect. His behavior is normal.   Vitals reviewed.      Labs:  Lab Results   Component Value Date    WBC 4.78 09/11/2017    HGB 11.7 (L) 09/11/2017    HCT 34.6 (L) 09/11/2017     09/11/2017    K 4.8 09/11/2017     (H) 09/11/2017    CO2 22 (L) 09/11/2017    BUN 34 (H) 09/11/2017    CREATININE 1.8 (H) 09/11/2017    EGFRNONAA 39.7 (A) 09/11/2017    CALCIUM 8.8 09/11/2017    PHOS 2.5 (L) 09/11/2017    MG 1.8 09/11/2017    ALBUMIN 3.6 09/11/2017    AST 19 08/05/2011    ALT 22 08/05/2011       Lab Results   Component Value Date    EXTANC  09/08/2014      Comment:      Coming to Clinic on 9/15/14    EXTWBC 4.7 08/10/2018    EXTSEGS 61.9 08/10/2018    EXTPLATELETS 157 08/10/2018    EXTHEMOGLOBI 11.8 (L) 08/10/2018    EXTHEMATOCRI 36.8 (L) 08/10/2018    EXTCREATININ 1.45 (H) 08/10/2018    EXTSODIUM 140 08/10/2018    EXTPOTASSIUM 4.8 08/10/2018    EXTBUN 24 (H) 08/10/2018    EXTCO2 25 08/10/2018    EXTCALCIUM 9.4 08/10/2018    EXTPHOSPHORU 3.0 08/10/2018    EXTGLUCOSE 116 (H) 08/10/2018    EXTALBUMIN 4.1 08/10/2018    EXTAST 19 08/10/2018    EXTALT 14 08/10/2018    EXTBILITOTAL 0.6 08/10/2018       Lab Results   Component Value Date     EXTTACROLVL 4.6 08/10/2018    EXTPROTCRE 0.11 08/10/2018    EXTPROTEINUA neg 08/10/2018    EXTWBCUA 0-2 08/10/2018    EXTRBCUA 1-4 (A) 08/10/2018       Labs were reviewed with the patient.    Assessment:     1. -donor kidney transplant 8/28/10    2. Chronic immunosuppression with Prograf and Cellcept    3. Stage 3 chronic kidney disease    4. Renovascular hypertension    5. PKD (polycystic kidney disease)    6. Recurrent skin cancer    7. Malignant melanoma, unspecified site        Plan:      Melanoma --f/u with dermatology as scheduled   Pt prefers to continue MMF and not have his kidney fail, Despite the risk of melanoma reoccurrence. We discussed stopping MMF if melanoma  Becomes  Invasive,  requiring a more extensive removal. MOHs.         Follow-up:   1. CKD stage: 3 stable    2. Immunosuppression:   Prograf trough , which is therapeutic target 4-6. Continue Prograf 2/ ,  Mg BID     3. Allograft Function: Stable. Continue good po hydration.    8/10/2018  7yr 11mo   EXT GFR MDRD NON AF AMER 49     EXTCREATININ 1.45 (H) 08/10/2018     4. Hypertension management: advise low salt diet and home BP monitoring    Benazepril 5 mg QD, coreg 12.5 mg BID, Norvasc 2.5 mg QD  plavix as presribed    5. Metabolic Bone Disease/Secondary Hyperparathyroidism:stable  Will monitor PTH, CA and Vit D/guidelines,    EXTCALCIUM 9.4 08/10/2018   EXTPHOSPHORU 3.0 08/10/2018     6. Electrolytes:  Will monitor /guidelines  EXTSODIUM 140 08/10/2018   EXTPOTASSIUM 4.8 08/10/2018   EXTBUN 24 (H) 08/10/2018   EXTCO2 25 08/10/2018     7. Anemia: stable. No need for intervention    Will monitor /guidelines  EXTWBC 4.7 08/10/2018   EXTSEGS 61.9 08/10/2018   EXTPLATELETS 157 08/10/2018   EXTHEMOGLOBI 11.8 (L) 08/10/2018   EXTHEMATOCRI 36.8 (L) 08/10/2018     8.  Cytopenias: no significant cytopenias will monitor as per our guidelines. Medicine list reviewed including potential causes of drug-induced cytopenias    9.Proteinuria:  continue p/c ratio as per guidelines        8/10/2018  7yr 11mo   EXT PROT/CREAT Ratio UR 0.11     10. BK virus infection screening:  will continue to monitor/ guidelines    11. Weight education: provided during the clinic visit   Body mass index is 27.75 kg/m².          12.Patient safety education regarding immunosuppression including prophylaxis posttransplant for CMV, PCP : Education provided about vaccination and prevention of infections            Follow-up:   Annual follow-up with kidney transplant clinic with repeat labs, including renal function panel with UA, urine protein/creatinine ratio, and drug trough level q3 months.  Patient also reminded to follow-up with general nephrologist.    Krystle Paul NP       Education:   Material provided to the patient.  Patient reminded to call with any health changes since these can be early signs of significant complications.  Also, I advised the patient to be sure any new medications or changes of old medications are discussed with either a pharmacist or physician knowledgeable with transplant to avoid rejection/drug toxicity related to significant drug interactions.    UNOS Patient Status  Functional Status: 80% - Normal activity with effort: some symptoms of disease  Physical Capacity: No Limitations

## 2018-08-13 NOTE — LETTER
August 13, 2018        Jean Crump  [address]  [place] [address]  Phone: [phone]  Fax: [phone]             [facility]  [address]  [facility] [address]  Phone: [phone]   Patient: Raymundo Restrepo   MR Number: [id number]   YOB: 1955   Date of Visit: 8/13/2018       Dear Dr. Jean Crump    Thank you for referring Raymundo Restrepo to me for evaluation. Attached you will find relevant portions of my assessment and plan of care.    If you have questions, please do not hesitate to call me. I look forward to following Raymundo Restrepo along with you.    Sincerely,    Krystle Paul, NP    Enclosure    If you would like to receive this communication electronically, please contact [email or web address] or [phone] to request [employer] Link access.    [employer] Link is a tool which provides read-only access to select patient information with whom you have a relationship. Its easy to use and provides real time access to review your patients record including encounter summaries, notes, results, and demographic information.    If you feel you have received this communication in error or would no longer like to receive these types of communications, please e-mail [email or web address]

## 2018-09-07 ENCOUNTER — OFFICE VISIT (OUTPATIENT)
Dept: DERMATOLOGY | Facility: CLINIC | Age: 63
End: 2018-09-07
Payer: MEDICARE

## 2018-09-07 DIAGNOSIS — L57.0 AK (ACTINIC KERATOSIS): ICD-10-CM

## 2018-09-07 DIAGNOSIS — D09.9 SQUAMOUS CELL CARCINOMA IN SITU: Primary | ICD-10-CM

## 2018-09-07 PROCEDURE — 17003 DESTRUCT PREMALG LES 2-14: CPT | Mod: PBBFAC | Performed by: DERMATOLOGY

## 2018-09-07 PROCEDURE — 99212 OFFICE O/P EST SF 10 MIN: CPT | Mod: 25,S$PBB,, | Performed by: DERMATOLOGY

## 2018-09-07 PROCEDURE — 17000 DESTRUCT PREMALG LESION: CPT | Mod: PBBFAC | Performed by: DERMATOLOGY

## 2018-09-07 PROCEDURE — 99212 OFFICE O/P EST SF 10 MIN: CPT | Mod: PBBFAC,25 | Performed by: DERMATOLOGY

## 2018-09-07 PROCEDURE — 17000 DESTRUCT PREMALG LESION: CPT | Mod: S$PBB,,, | Performed by: DERMATOLOGY

## 2018-09-07 PROCEDURE — 17003 DESTRUCT PREMALG LES 2-14: CPT | Mod: S$PBB,,, | Performed by: DERMATOLOGY

## 2018-09-07 PROCEDURE — 99999 PR PBB SHADOW E&M-EST. PATIENT-LVL II: CPT | Mod: PBBFAC,,, | Performed by: DERMATOLOGY

## 2018-09-07 RX ORDER — ALLOPURINOL 300 MG/1
TABLET ORAL
COMMUNITY
Start: 2018-01-11 | End: 2019-09-24 | Stop reason: SDUPTHER

## 2018-09-07 RX ORDER — BENAZEPRIL HYDROCHLORIDE 5 MG/1
TABLET ORAL
COMMUNITY
Start: 2018-05-04 | End: 2019-09-24 | Stop reason: SDUPTHER

## 2018-09-07 RX ORDER — FLUOROURACIL 50 MG/G
CREAM TOPICAL
Qty: 40 G | Refills: 1 | Status: SHIPPED | OUTPATIENT
Start: 2018-09-07 | End: 2020-08-11 | Stop reason: SDUPTHER

## 2018-09-07 RX ORDER — CARVEDILOL 12.5 MG/1
12.5 TABLET ORAL 2 TIMES DAILY
COMMUNITY
Start: 2018-02-02

## 2018-09-07 RX ORDER — TACROLIMUS 0.5 MG/1
CAPSULE ORAL
COMMUNITY
Start: 2018-08-21 | End: 2020-06-02 | Stop reason: SDUPTHER

## 2018-09-07 RX ORDER — NITROGLYCERIN 0.4 MG/1
0.4 TABLET SUBLINGUAL
COMMUNITY
Start: 2018-02-06

## 2018-09-07 RX ORDER — PANTOPRAZOLE SODIUM 40 MG/1
TABLET, DELAYED RELEASE ORAL
COMMUNITY
Start: 2018-04-18 | End: 2019-09-24 | Stop reason: SDUPTHER

## 2018-09-07 RX ORDER — CLOPIDOGREL BISULFATE 75 MG/1
75 TABLET ORAL
COMMUNITY
Start: 2018-04-19 | End: 2019-09-24 | Stop reason: SDUPTHER

## 2018-09-07 NOTE — Clinical Note
Skin, right preauricular cheek, inferior, shave biopsy:- SQUAMOUS CELL CARCINOMA IN SITU.- THE TUMOR FOCALLY EXTENDS TO A LATERAL BIOPSY MARGIN.Please call patient to schedule a Mohs consult with Dr. Guzman for this lesion.

## 2018-09-07 NOTE — PROGRESS NOTES
"  Subjective:       Patient ID:  Raymundo Restrepo is a 63 y.o. male who presents for   Chief Complaint   Patient presents with    Skin Check     Skin cancer removal     HPI  Pt was last seen by Dr. Moody in Dec 2017 for electrodesiccation and curettage of SCCIS on the L dorsal wrist and L neck (biopsies done on 9/11/17).  61 yo M with a history of skin cancer, Hodgkins lymphoma (1988), polycystic kidney disease, kidney transplant (2010; on prednisone, tacrolimus, MMF), CAD, HTN, and ischemic colitis here for management of skin cancers while on immunosuppressants. He was previously seeing Dr. Pineda in [place] and patient reports that he is reluctant to have him surgically treat remaining skin cancers as patient had issues with bleeding in the past. Per patient, he has been told he has had several melanomas. He says he has been treated with surgical procedures involving "digging and scraping", but has not had any procedures requiring stitches.      Below are the biopsy results from Dr. Moody done about a year ago. Two of the skin cancers were treated in 12/2017 by ED&C as mentioned.  She had prescribed Efudex for him to treat the 2 lesions on his R cheek; however, pt states his pharmacy never received the prescription.  Pt states the SCCIS biopsied on his L posterior neck never seemed to recur.    PATHOLOGY 9/11/17  Skin, right preauricular cheek, superior, shave biopsy:  - HYPERTROPHIC ACTINIC KERATOSIS    Skin, right preauricular cheek, inferior, shave biopsy:  - SQUAMOUS CELL CARCINOMA IN SITU.  - THE TUMOR FOCALLY EXTENDS TO A LATERAL BIOPSY MARGIN.    Skin, left neck, shave biopsy:  - SQUAMOUS CELL CARCINOMA IN SITU - s/p ED&C 12/2017  - THE TUMOR EXTENDS TO THE LATERAL MARGINS.    Skin, left posterior neck, shave biopsy:  - SQUAMOUS CELL CARCINOMA IN SITU.  - THE TUMOR EXTENDS TO A LATERAL BIOPSY MARGIN.    Skin, left dorsal wrist, shave biopsy   - SQUAMOUS CELL CARCINOMA IN SITU - s/p ED&C 12/2017  - " MARGINS ARE NEGATIVE IN THE PLANES OF SECTION    Review of Systems   Constitutional: Positive for night sweats. Negative for fever, chills, weight loss, weight gain, fatigue and malaise.   Skin: Negative for daily sunscreen use, activity-related sunscreen use and recent sunburn.   Hematologic/Lymphatic: Bruises/bleeds easily.        Objective:    Physical Exam   Constitutional: He appears well-developed and well-nourished. No distress.   Neurological: He is alert and oriented to person, place, and time. He is not disoriented.   Psychiatric: He has a normal mood and affect.   Skin:   Areas Examined (abnormalities noted in diagram):   Head / Face Inspection Performed  Neck Inspection Performed              Diagram Legend     Erythematous scaling macule/papule c/w actinic keratosis       Vascular papule c/w angioma      Pigmented verrucoid papule/plaque c/w seborrheic keratosis      Yellow umbilicated papule c/w sebaceous hyperplasia      Irregularly shaped tan macule c/w lentigo     1-2 mm smooth white papules consistent with Milia      Movable subcutaneous cyst with punctum c/w epidermal inclusion cyst      Subcutaneous movable cyst c/w pilar cyst      Firm pink to brown papule c/w dermatofibroma      Pedunculated fleshy papule(s) c/w skin tag(s)      Evenly pigmented macule c/w junctional nevus     Mildly variegated pigmented, slightly irregular-bordered macule c/w mildly atypical nevus      Flesh colored to evenly pigmented papule c/w intradermal nevus       Pink pearly papule/plaque c/w basal cell carcinoma      Erythematous hyperkeratotic cursted plaque c/w SCC      Surgical scar with no sign of skin cancer recurrence      Open and closed comedones      Inflammatory papules and pustules      Verrucoid papule consistent consistent with wart     Erythematous eczematous patches and plaques     Dystrophic onycholytic nail with subungual debris c/w onychomycosis     Umbilicated papule    Erythematous-base heme-crusted  tan verrucoid plaque consistent with inflamed seborrheic keratosis     Erythematous Silvery Scaling Plaque c/w Psoriasis     See annotation      Assessment / Plan:        Squamous cell carcinoma in situ and AK (actinic keratosis)  Recommended using Efudex on the posterior neck site to ensure it is clear (no clinical evidence of SCCIS recurring on exam today).  May also use Efudex on the areas treated with cryo today after they have healed. If they recur after the Efudex, would recommend re-biopsy.  -     fluorouracil (EFUDEX) 5 % cream; AAA on face and neck BID x 4-6 weeks. Stop if blistered, oozing, or bleeding. Use daily sun protection.  Dispense: 40 g; Refill: 1  Counseled extensively regarding the proper use and side effects of efudex. Pt will discontinue use if areas begin to ulcerate, bleed, blister, etc.    Will refer pt to Mohs for bx-proven SCCIS on R preauricular cheek (inferior) as it is very thick and doesn't appear amenable to Efudex treatment.    AK (actinic keratosis)  Cryosurgery Procedure Note  Verbal consent from the patient is obtained including, but not limited to, risk of hypopigmentation/hyperpigmentation, scar, recurrence of lesion. The patient is aware of the precancerous quality and need for treatment of these lesions. Liquid nitrogen cryosurgery is applied to the 3 actinic keratoses, as detailed in the physical exam, to produce a freeze injury. The patient is aware that blisters may form and is instructed on wound care with gentle cleansing and use of vaseline ointment to keep moist until healed. The patient is supplied a handout on cryosurgery and is instructed to call if lesions do not completely resolve.    rtc 3 months for skin check

## 2018-09-10 ENCOUNTER — TELEPHONE (OUTPATIENT)
Dept: DERMATOLOGY | Facility: CLINIC | Age: 63
End: 2018-09-10

## 2018-09-10 NOTE — TELEPHONE ENCOUNTER
----- Message from Claudia Lugo MD sent at 9/9/2018 11:15 AM CDT -----  Skin, right preauricular cheek, inferior, shave biopsy:  - SQUAMOUS CELL CARCINOMA IN SITU.  - THE TUMOR FOCALLY EXTENDS TO A LATERAL BIOPSY MARGIN.    Please call patient to schedule a Mohs consult with Dr. Guzman for this lesion.

## 2018-09-26 ENCOUNTER — INITIAL CONSULT (OUTPATIENT)
Dept: DERMATOLOGY | Facility: CLINIC | Age: 63
End: 2018-09-26
Payer: MEDICARE

## 2018-09-26 VITALS
HEART RATE: 65 BPM | SYSTOLIC BLOOD PRESSURE: 145 MMHG | DIASTOLIC BLOOD PRESSURE: 78 MMHG | WEIGHT: 171 LBS | BODY MASS INDEX: 27.48 KG/M2 | HEIGHT: 66 IN

## 2018-09-26 DIAGNOSIS — Z79.899 LONG TERM CURRENT USE OF IMMUNOSUPPRESSIVE DRUG: ICD-10-CM

## 2018-09-26 DIAGNOSIS — D04.39 SQUAMOUS CELL CARCINOMA IN SITU (SCCIS) OF SKIN OF RIGHT CHEEK: Primary | ICD-10-CM

## 2018-09-26 PROCEDURE — 99213 OFFICE O/P EST LOW 20 MIN: CPT | Mod: PBBFAC,PO | Performed by: DERMATOLOGY

## 2018-09-26 PROCEDURE — 99214 OFFICE O/P EST MOD 30 MIN: CPT | Mod: S$PBB,,, | Performed by: DERMATOLOGY

## 2018-09-26 PROCEDURE — 99999 PR PBB SHADOW E&M-EST. PATIENT-LVL III: CPT | Mod: PBBFAC,,, | Performed by: DERMATOLOGY

## 2018-09-26 NOTE — PROGRESS NOTES
ALLERGIES:  Iodine and Morphine    CHIEF COMPLAINT:  This 63 y.o. male comes for evaluation for Mohs' Micrographic Surgery, Fresh Tissue Technique, for treatment of a biopsy-proven squamous cell carcinoma on the right preauricular cheek. Consultation requested by Claudia Lugo M.D..    HISTORY OF PRESENT ILLNESS:   Location: right pre auricular cheek  Duration: 4 years  or more  Quality: persistent  Context: status post biopsy by Claudia Lugo M.D.; path = squamous cell carcinoma; pathology accession #[id number],  Pathology [facility]   Lesion was biopsied 2017   Prior Treatment: none    See also the handwritten notes/diagrams scanned to chart for additional details.    Defibrillator: No  Pacemaker: No  Artificial heart valves: No  Artificial joints: No    REVIEW OF SYSTEMS:   General: general health fair  Skin: has previous history of skin cancer(s)  CV: has hypertension, has coronary artery disease; no artificial valves, has no chest pain  Resp: has no shortness of breath  Endo: has no diabetes  Hem/Lymph: not taking prescribed anticoagulants, has easy bruising/bleeding  Allergy/Immuno: has allergies as noted above; on immunosuppressants for renal transplant  GI: has no history of hepatitis  : post renal transplant  MS: as noted above   Neuro: prior TIA's; 2 in last 2 months    PAST MEDICAL HISTORY:  Past Medical History:   Diagnosis Date    CAD (coronary artery disease), native coronary artery      Stent placed 2007 In-stent restenosis, required a 2nd stent 2010    -donor kidney transplant 2010    Gross hematuria     history of gross hematuria    Hiatal hernia     Hypertension 9/15/2014    Ischemic colitis     Kidney stones     Need for prophylactic immunotherapy     PKD (polycystic kidney disease)     Recurrent skin cancer     Renal hypertension     Ruptured cyst of kidney     Seizure     Skin cancer     TIA (transient ischemic attack)        PAST SURGICAL HISTORY:  Past  Surgical History:   Procedure Laterality Date    CARDIAC CATHETERIZATION  01/2012    CORONARY STENT PLACEMENT      KIDNEY TRANSPLANT          SOCIAL HISTORY:  Dependencies: smoking status as noted below  Social History     Tobacco Use    Smoking status: Never Smoker    Smokeless tobacco: Never Used   Substance Use Topics    Alcohol use: No    Drug use: No       PERTINENT MEDICATIONS:  See medications list.    Current Outpatient Medications:     allopurinol (ZYLOPRIM) 100 MG tablet, Take 300 mg by mouth once daily. , Disp: , Rfl:     allopurinol (ZYLOPRIM) 300 MG tablet, TAKE 1 TABLET BY MOUTH ONCE DAILY, Disp: , Rfl:     amLODIPine (NORVASC) 2.5 MG tablet, Take 2.5 mg by mouth., Disp: , Rfl:     benazepril (LOTENSIN) 5 MG tablet, Take 1 tablet by mouth Daily., Disp: , Rfl:     benazepril (LOTENSIN) 5 MG tablet, TAKE 1 TABLET DAILY., Disp: , Rfl:     carvedilol (COREG) 12.5 MG tablet, Take 12.5 mg by mouth., Disp: , Rfl:     carvedilol (COREG) 3.125 MG tablet, TAKE (2) TABLET BY MOUTH TWICE DAILY WITH MEALS, Disp: , Rfl:     clopidogrel (PLAVIX) 75 mg tablet, Take 1 tablet by mouth Daily., Disp: , Rfl:     clopidogrel (PLAVIX) 75 mg tablet, Take 75 mg by mouth., Disp: , Rfl:     fluorouracil (EFUDEX) 5 % cream, AAA on face BID x 4-6 weeks. Stop if blistered, oozing, or bleeding. Use daily sun protection., Disp: 40 g, Rfl: 1    multivitamin (THERAGRAN) per tablet, Take 1 tablet by mouth Daily., Disp: , Rfl:     mycophenolate (CELLCEPT) 250 mg Cap, Take 1 capsule (250 mg total) by mouth 2 (two) times daily., Disp: 60 capsule, Rfl: 11    nitroGLYCERIN (NITROSTAT) 0.4 MG SL tablet, Place 0.4 mg under the tongue., Disp: , Rfl:     pantoprazole (PROTONIX) 40 MG tablet, Take 1 tablet by mouth Daily., Disp: , Rfl:     pantoprazole (PROTONIX) 40 MG tablet, TAKE (1) TABLET DAILY., Disp: , Rfl:     sodium bicarbonate 650 MG tablet, Take 650 mg by mouth 2 (two) times daily., Disp: , Rfl:     tacrolimus  (PROGRAF) 0.5 MG Cap, take 2 every morning and 2 every evening, Disp: , Rfl:     tacrolimus (PROGRAF) 1 MG Cap, Take 2 capsules (2 mg total) by mouth every 12 (twelve) hours. Z94.0, Disp: 120 capsule, Rfl: 11    ALLERGIES:  Iodine and Morphine    EXAM:  See also the handwritten notes/diagrams scanned to chart for additional details.  Constitutional  General appearance: well-developed, well-nourished, well-kempt older white male    Eyes  Inspection of conjunctivae and lids reveals no abnormalities; sclerae anicteric  Neurologic/Psychiatric  Alert,  normal orientation to time, place, person  Normal mood and affect with no evidence of depression, anxiety, agitation  Skin: see photo(s)  Head: background moderate solar damage to exposed areas of skin; in addition, inspection/palpation reveals an approximately 1.8 cm raised, keratotic tumor on the right cheek;  he confirmed this as the site of the prior biopsy  Neck: examination reveals moderate chronic solar damage  Right upper extremity: examination reveals moderate chronic solar damage  Left upper extremity: examination reveals moderate chronic solar damage    ASSESSMENT: biopsy-proven squamous cell carcinoma in situ or the right cheek  chronic solar damage to areas as noted above  personal history of non-melanoma skin cancer  Post transplant  Long term current use of immunosuppressive drug  Long term current use of systemic steroids    PLAN:  The diagnosis and management options, and risks and benefits of the alternatives, including observation/non-treatment, radiation treatment, excision with vertical frozen section or paraffin-embedded section margin evaluation, and Mohs' Micrographic Surgery, Fresh Tissue Technique, were discussed at length with the patient. In particular, the discussion included, but was not limited to, the following:    One alternative at this point would be to defer further treatment and observe the lesion. With small skin cancers of this kind,  it is possible that a biopsy can be sufficient to definitively treat a small skin cancer of this kind. Alternatively, some skin cancers are slow growing and do not require immediate treatment. The potential advantage of this choice would be to avoid the need for possibly unnecessary additional surgery. Among the potential disadvantages of this would be the possibility of enlargement of the lesion, more extensive spread of the lesion or recurrence at a later date, which might necessitate a larger and more complex surgery.    Radiation treatment can be an effective treatment for this type of skin cancer. The usual course of treatment is every weekday for several weeks. Local irritation will result from treatment, although no systemic side effects are expected. The potential advantage of radiation treatment is that it avoids the need for surgery. Among the disadvantages of radiation treatment are the length of treatment, the local inflammatory response, the absence of pathologic confirmation of the removal of the skin cancer, a possible increased risk of additional skin cancer in the treated area in later years, and a somewhat increased risk of recurrence at a later date.     Excisional surgery can be an effective treatment for this type of skin cancer. This would involve excision of the lesion with margin evaluation by submitting the specimen to a pathologist for either immediate marginal assessment via frozen section processing, or delayed marginal assessment by fixed-tissue processing. The potential advantage of this technique is that it offers a way of treating the lesion with some degree of histologic confirmation of tumor removal. Among the disadvantages of this treatment are the possible need for re-excision if marginal involvement is identified, a somewhat greater likelihood of recurrence as compared to Mohs' surgery because of the less comprehensive margin evaluation inherent in the technique, and the general  potential risks of surgery, including allergic reactions to the anesthetic and other materials used, infection, injury to nerves in the area with consequent loss of sensation or muscle function, and scarring or distortion of surrounding structures.    Mohs' surgery is a very effective treatment for this type of skin cancer. The potential advantage of Mohs' surgery is that this technique offers the greatest possible certainty of knowing that the skin cancer has been completely removed, with the removal of the least amount of normal tissue. The potential disadvantages of Mohs' surgery include the duration of the surgery, the possible need for a separate surgery for reconstruction following tumor removal, and scarring as a result. In addition, general potential risks of surgery as noted above also apply to treatment via Mohs' surgery.    In light of the nature of this tumor and the location on the face in an area of increased risk of recurrence, and given his high risk status due to his ongoing treatment with immunosuppressants,  Mohs' micrographic surgery was thought to be the most appropriate management choice, and this diagnosis is appropriate for treatment by Mohs' micrographic surgery.     Sufficient time was available for questions, and all questions were answered to his satisfaction. He fully understands the aims, risks, alternatives, and possible complications, and has elected to proceed with the surgery, and verbally consented to do so. The procedure will be scheduled in the near future.    Routine pre-op instructions were given to him.    --------------------------------------  Note: Some or all of this note may have been generated using voice recognition software. There may be voice recognition errors including grammatical and/or spelling errors found in the text. Attempts were made to correct these errors prior to signature.

## 2018-09-26 NOTE — LETTER
September 27, 2018      Fady Lugo MD  [address]  [facility] [address]           [facility]  [address] [facility] [address] [place] [address]  Phone: [phone]          Patient: Raymundo Restrepo   MR Number: [id number]   YOB: 1955   Date of Visit: 9/26/2018       Dear Dr. Fady Lugo:    Thank you for referring Raymundo Restrepo to me for evaluation. Attached you will find relevant portions of my assessment and plan of care.    If you have questions, please do not hesitate to call me. I look forward to following Raymundo Restrepo along with you.    Sincerely,    Olvin Guzman MD    Enclosure  CC:  No Recipients    If you would like to receive this communication electronically, please contact [email or web address] or [phone] to request more information on [employer] access.    For providers and/or their staff who would like to refer a patient to [facility], please contact us through our one-stop-shop provider referral line, Jevon Perez, at [phone].    If you feel you have received this communication in error or would no longer like to receive these types of communications, please e-mail [email or web address]

## 2018-10-01 DIAGNOSIS — Z94.0 KIDNEY REPLACED BY TRANSPLANT: ICD-10-CM

## 2018-10-01 RX ORDER — TACROLIMUS 1 MG/1
CAPSULE ORAL
Qty: 90 CAPSULE | Refills: 11 | Status: SHIPPED | OUTPATIENT
Start: 2018-10-01 | End: 2019-06-26 | Stop reason: SDUPTHER

## 2018-11-26 NOTE — PROGRESS NOTES
ALLERGIES:   Iodine and Morphine      Current Outpatient Medications:     allopurinol (ZYLOPRIM) 100 MG tablet, Take 300 mg by mouth once daily. , Disp: , Rfl:     allopurinol (ZYLOPRIM) 300 MG tablet, TAKE 1 TABLET BY MOUTH ONCE DAILY, Disp: , Rfl:     amLODIPine (NORVASC) 2.5 MG tablet, Take 2.5 mg by mouth., Disp: , Rfl:     benazepril (LOTENSIN) 5 MG tablet, Take 1 tablet by mouth Daily., Disp: , Rfl:     benazepril (LOTENSIN) 5 MG tablet, TAKE 1 TABLET DAILY., Disp: , Rfl:     carvedilol (COREG) 12.5 MG tablet, Take 12.5 mg by mouth., Disp: , Rfl:     carvedilol (COREG) 3.125 MG tablet, TAKE (2) TABLET BY MOUTH TWICE DAILY WITH MEALS, Disp: , Rfl:     clopidogrel (PLAVIX) 75 mg tablet, Take 1 tablet by mouth Daily., Disp: , Rfl:     clopidogrel (PLAVIX) 75 mg tablet, Take 75 mg by mouth., Disp: , Rfl:     fluorouracil (EFUDEX) 5 % cream, AAA on face BID x 4-6 weeks. Stop if blistered, oozing, or bleeding. Use daily sun protection., Disp: 40 g, Rfl: 1    multivitamin (THERAGRAN) per tablet, Take 1 tablet by mouth Daily., Disp: , Rfl:     mycophenolate (CELLCEPT) 250 mg Cap, Take 1 capsule (250 mg total) by mouth 2 (two) times daily., Disp: 60 capsule, Rfl: 11    nitroGLYCERIN (NITROSTAT) 0.4 MG SL tablet, Place 0.4 mg under the tongue., Disp: , Rfl:     pantoprazole (PROTONIX) 40 MG tablet, Take 1 tablet by mouth Daily., Disp: , Rfl:     pantoprazole (PROTONIX) 40 MG tablet, TAKE (1) TABLET DAILY., Disp: , Rfl:     sodium bicarbonate 650 MG tablet, Take 650 mg by mouth 2 (two) times daily., Disp: , Rfl:     tacrolimus (PROGRAF) 0.5 MG Cap, take 2 every morning and 2 every evening, Disp: , Rfl:     tacrolimus (PROGRAF) 1 MG Cap, TAKE (2) CAPSULES (2 MG TOTAL) BY MOUTH EVERY MORNING AND 1 CAPSULE EVERY EVENING, Disp: 90 capsule, Rfl: 11  -------------------------------------------------------------  PROCEDURE: Mohs' Micrographic Surgery    SITE: right preauricular cheek     INDICATION: 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 at increased risk of recurrence    CASE NUMBER: [id number]      ANESTHETIC: 3 mL 2% Lidocaine with Epinephrine 1:100,000    SURGICAL PREP: Ethanol and Hibiclens    SURGEON: Olvin Guzman MD    ASSISTANTS: Negro Bates CST     PREOPERATIVE DIAGNOSIS: squamous cell carcinoma in situ    POSTOPERATIVE DIAGNOSIS: squamous cell carcinoma in situ    PATHOLOGIC DIAGNOSIS: squamous cell carcinoma in situ    STAGES OF MOHS' SURGERY PERFORMED: one    TUMOR-FREE PLANE ACHIEVED: yes    HEMOSTASIS: Hyfrecation     SPECIMENS: one (one in stage A)    INITIAL LESION SIZE: 1.6 x 2.0 cm    FINAL DEFECT SIZE: 1.6 x 2.0 cm    WOUND REPAIR/DISPOSITION: see below    NARRATIVE:    The patient is a 63 y.o.male referred by Claudia Lugo MD with a history of cancer on the right preauricular cheek  which was biopsied - pathology accession #[id number],  Pathology [facility]. 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 In light of the nature of this tumor and the location on the face, and given his high risk status as a renal transplant patient on long term immunosuppressive treatment, Mohs' micrographic surgery was thought to be the most appropriate management choice, and this diagnosis is appropriate for treatment by Mohs' micrographic surgery.  I discussed it with the patient and he fully understands the aims, risks, alternatives, and possible complications, and elects to proceed.  There are no medical or surgical contraindications to the procedure.     A signed informed consent was obtained.    PROCEDURE:  The patient was placed in the left lateral decubitus position on the operating table in the Mohs' Surgery Suite. The area in question was thoroughly prepped with ethanol and Hibiclens, with particular care to avoid  "application to the immediate periocular skin or introduction into the external auditory meatus. A sterile surgical marker was used to outline the clinically apparent margins of the involved area, and a narrow margin of normal-appearing skin. Reference marks were made at the periphery of the outlined area with the surgical marker. The proposed area of excision was measured and photographed. Local anesthesia as noted above was administered.  The total volume of anesthetic used throughout this portion of the procedure was as documented above. The area was prepared and draped in the standard manner. All of the grossly identifiable area of clinically abnormal tissue and an underlying/peripheral layer was taken and processed by the Mohs' technique.  Hemostasis was obtained with the hyfrecator. Tissue was taken from any areas of residual marginal involvement (if present) and processed by the Mohs' technique in as many stages as needed until a tumor-free plane was achieved.    Colors of inks used in the reference nicks at epidermal margins (if present) and/or inking of non-epithelial edges, if applicable, is represented on the Mohs map as follows: solid lines represent red ink, dots represent blue ink, jagged lines represent black ink, curlicues represent green ink, "xxx" represents yellow ink.    The first Mohs' layer consisted of one section(s) with 5 slide(s) evaluated. No residual tumor was noted at the margins of the first Mohs' layer. Histology of the specimen(s) showed changes consistent with chronic solar damage.     A total of one section(s) and 5 slide(s) were examined under the microscope via the Mohs technique.  A cancer free plane was reached after layer number one. Defect final size was as noted above.      The wound was covered with a nonadherent dressing between stages, and the patient allowed to wait in the waiting area during these periods. The final defect was photographed at the completion of the Mohs' " procedure.    See the separate procedure note which follows regarding repair of the defect following Mohs' surgery.     -----------------------------------------------    REPAIR FOLLOWING MOHS' MICROGRAPHIC SURGERY    PREOPERATIVE DIAGNOSIS: defect following Mohs' surgery for a squamous cell carcinoma in situ    POSTOPERATIVE DIAGNOSIS: same    PROCEDURE PERFORMED: complex linear closure     ANESTHETIC: 7 mL 2% Lidocaine with Epinephrine 1:100,000     SURGICAL PREP: Hibiclens    SURGEON: Olvin Guzman MD     ASSISTANTS: as above    LOCATION: right cheek       INDICATIONS:  Earlier in the day, the patient underwent Mohs' micrographic surgical excision of a squamous cell carcinoma in situ on the right cheek. Tumor free margins were achieved after layer number one.  Later in the day, the management of the resulting wound was addressed with the patient. I discussed the various wound management options with the patient and he fully understands the aims, risks, alternatives, and possible complications of the alternatives, and he elects to proceed with closure of the defect in the manner noted below.  There are no medical or surgical contraindications to the procedure.    A signed informed consent was previously obtained.    PROCEDURE:  Repair via complex closure:  The patient was returned to the procedure room following completion of the Mohs' procedure and final slide review. Because of the size, shape and location of the defect, simple closure could not be achieved without possible distortion of surrounding structures, excessive tension on the wound margins and an unacceptable risk of wound dehiscence, and the creation of standing cone deformities. Consideration was given to the site of the wound, the surrounding structures, and the orientation of closure necessary to provide the optimal functional and cosmetic outcome. After devoting time to these considerations, and to the orientation of the vectors of maximal  skin tension surrounding the defect, the area was prepped again and a fusiform closure was outlined on the skin surrounding the defect with a sterile surgical marker, to minimize tension across the wound. Additional anesthetic was infiltrated into the tissues surrounding the defect and the anticipated area of repair, to maintain anesthesia during the procedure. Preparation of the site for closure was then carried out by extending the defect through excision of triangles of superfluous tissue on either side of the wound to square the shoulders of the defect and to allow closure without distortion by standing cone deformities, creating a semifusiform defect with an inferior M-plasty.  Wound margins were extensively undermined to allow advancement of the wound margins into the defect and to permit closure with minimal tension. After hemostasis was achieved with the hyfrecator, closure was accomplished with:      multiple #5-0 buried interrupted Vicryl suture(s) and    multiple #5-0 simple interrupted Prolene suture(s) and    one #5-0 running locked Prolene suture(s) for final approximation of the wound margins.    Total length of the final closure, including the limbs of the M-plasty, was 8.0 cm.     Total operative time including tissue processing in the Mohs' laboratory and microscopic Mohs' frozen section slide review was 2 hour(s). Verbal and written wound care instructions were given to the patient, and he expressed understanding of these instructions. The patient tolerated the procedure well and left the operating room in good condition; he is to return in 7 days for suture removal.     Dr. Guzman's cell phone number was given to the patient with instructions to call prn with any problems.

## 2018-11-27 ENCOUNTER — PROCEDURE VISIT (OUTPATIENT)
Dept: DERMATOLOGY | Facility: CLINIC | Age: 63
End: 2018-11-27
Payer: MEDICARE

## 2018-11-27 VITALS — SYSTOLIC BLOOD PRESSURE: 158 MMHG | HEART RATE: 61 BPM | DIASTOLIC BLOOD PRESSURE: 80 MMHG

## 2018-11-27 DIAGNOSIS — D04.39 SQUAMOUS CELL CARCINOMA IN SITU (SCCIS) OF SKIN OF RIGHT CHEEK: Primary | ICD-10-CM

## 2018-11-27 PROCEDURE — 99499 UNLISTED E&M SERVICE: CPT | Mod: S$PBB,,, | Performed by: DERMATOLOGY

## 2018-11-27 PROCEDURE — 17311 MOHS 1 STAGE H/N/HF/G: CPT | Mod: S$PBB,,, | Performed by: DERMATOLOGY

## 2018-11-27 PROCEDURE — 13133 CMPLX RPR F/C/C/M/N/AX/G/H/F: CPT | Mod: PBBFAC,PO | Performed by: DERMATOLOGY

## 2018-11-27 PROCEDURE — 13133 CMPLX RPR F/C/C/M/N/AX/G/H/F: CPT | Mod: S$PBB,,, | Performed by: DERMATOLOGY

## 2018-11-27 PROCEDURE — 17311 MOHS 1 STAGE H/N/HF/G: CPT | Mod: PBBFAC,PO | Performed by: DERMATOLOGY

## 2018-11-27 PROCEDURE — 13132 CMPLX RPR F/C/C/M/N/AX/G/H/F: CPT | Mod: 51,S$PBB,, | Performed by: DERMATOLOGY

## 2018-11-27 PROCEDURE — 13132 CMPLX RPR F/C/C/M/N/AX/G/H/F: CPT | Mod: PBBFAC,PO | Performed by: DERMATOLOGY

## 2018-11-30 ENCOUNTER — TELEPHONE (OUTPATIENT)
Dept: DERMATOLOGY | Facility: CLINIC | Age: 63
End: 2018-11-30

## 2018-11-30 NOTE — TELEPHONE ENCOUNTER
----- Message from Dominique Monreal sent at 11/30/2018  9:48 AM CST -----  Contact: patient   Needs Advice    Reason for call: Patient would like to speak to nurse in reg to rescheduling current appt         Communication Preference: [phone]

## 2018-11-30 NOTE — TELEPHONE ENCOUNTER
----- Message from An Anderson sent at 11/30/2018 10:52 AM CST -----  Contact: carol Guzman - pt Needs Advice    Reason for call: pt is calling to speak with the nurse pt had set up an appt with Dr Moody pt was told to call back to set up an appt with Dr Guzman         Communication Preference: pt    Additional Information: can you please call pt at [phone] jc

## 2019-01-14 ENCOUNTER — OFFICE VISIT (OUTPATIENT)
Dept: DERMATOLOGY | Facility: CLINIC | Age: 64
End: 2019-01-14
Payer: MEDICARE

## 2019-01-14 DIAGNOSIS — Z85.828 PERSONAL HISTORY OF MALIGNANT NEOPLASM OF SKIN: ICD-10-CM

## 2019-01-14 DIAGNOSIS — L82.1 SEBORRHEIC KERATOSES: ICD-10-CM

## 2019-01-14 DIAGNOSIS — D09.9 SQUAMOUS CELL CARCINOMA IN SITU: Primary | ICD-10-CM

## 2019-01-14 DIAGNOSIS — L57.0 ACTINIC KERATOSES: ICD-10-CM

## 2019-01-14 PROCEDURE — 99213 PR OFFICE/OUTPT VISIT, EST, LEVL III, 20-29 MIN: ICD-10-PCS | Mod: S$PBB,,, | Performed by: DERMATOLOGY

## 2019-01-14 PROCEDURE — 99999 PR PBB SHADOW E&M-EST. PATIENT-LVL III: CPT | Mod: PBBFAC,,, | Performed by: DERMATOLOGY

## 2019-01-14 PROCEDURE — 99213 OFFICE O/P EST LOW 20 MIN: CPT | Mod: S$PBB,,, | Performed by: DERMATOLOGY

## 2019-01-14 PROCEDURE — 99999 PR PBB SHADOW E&M-EST. PATIENT-LVL III: ICD-10-PCS | Mod: PBBFAC,,, | Performed by: DERMATOLOGY

## 2019-01-14 PROCEDURE — 99213 OFFICE O/P EST LOW 20 MIN: CPT | Mod: PBBFAC,PO | Performed by: DERMATOLOGY

## 2019-01-14 NOTE — PROGRESS NOTES
Subjective:       Patient ID:  Raymundo Restrepo is a 63 y.o. male who presents for   Chief Complaint   Patient presents with    Follow-up     HPI    61 yo M here for evaluation of a few rough spots on his face and arms.  Last OV with Dr Lugo 18.    Since then, he saw Dr Guzman for Mohs surgery for SCCIS on R preauricular cheek    Derm Hx: high risk for development of skin cancer due to immunosuppression s/p kidney transplant   R preauricular cheek-SCCIS treated by Dr Guzman .  L neck-SCCIS  L posterior neck-SCCIS  L dorsal wrist-SCCIS treated by ED&C    Past Medical History:   Diagnosis Date    CAD (coronary artery disease), native coronary artery      Stent placed 2007 In-stent restenosis, required a 2nd stent 2010    -donor kidney transplant 2010    Gross hematuria     history of gross hematuria    Hiatal hernia     Hypertension 9/15/2014    Ischemic colitis     Kidney stones     Need for prophylactic immunotherapy     PKD (polycystic kidney disease)     Recurrent skin cancer     Renal hypertension     Ruptured cyst of kidney     Seizure     Skin cancer     TIA (transient ischemic attack)      Review of Systems   Constitutional: Negative for fever, chills and weight loss.   Skin: Negative for daily sunscreen use and activity-related sunscreen use.   Hematologic/Lymphatic: Bruises/bleeds easily.        On Plavix        Objective:    Physical Exam   Constitutional: He appears well-developed and well-nourished.   Neurological: He is alert and oriented to person, place, and time.   Psychiatric: He has a normal mood and affect.   Skin:   Areas Examined (abnormalities noted in diagram):   Head / Face Inspection Performed  Neck Inspection Performed  RUE Inspected  LUE Inspection Performed                   Diagram Legend    See annotation      Assessment / Plan:          Squamous cell carcinoma in situ on L neck and AKs  Pt would like to treat with  Efudex  Discussed the likely erythema, crusting, swelling, inflammation and showed photos of what to expect    Personal history of malignant neoplasm of skin  Continue regular skin checks     Seborrheic Keratoses  These are benign inherited growths without a malignant potential. Reassurance given to patient. No treatment is necessary.            Follow-up in about 2 months (around 3/14/2019) for after Efudex.

## 2019-01-23 ENCOUNTER — TELEPHONE (OUTPATIENT)
Dept: ENDOSCOPY | Facility: HOSPITAL | Age: 64
End: 2019-01-23

## 2019-01-23 DIAGNOSIS — R13.10 DYSPHAGIA, UNSPECIFIED TYPE: Primary | ICD-10-CM

## 2019-01-23 NOTE — TELEPHONE ENCOUNTER
----- Message from Jose G Tafoya MD sent at 1/11/2019  4:46 PM CST -----  Pat- looks like referral is for dysphagia. Call him. Next step would be EGD, if he's up for it. Good for general GI.

## 2019-01-23 NOTE — TELEPHONE ENCOUNTER
Please sign order for EGD. I spoke with him and he is ok to come directly for EGD. I scheduled him for 2/1 at 10:30 pending ok to hold Plavix

## 2019-01-24 ENCOUNTER — TELEPHONE (OUTPATIENT)
Dept: ENDOSCOPY | Facility: HOSPITAL | Age: 64
End: 2019-01-24

## 2019-01-24 NOTE — TELEPHONE ENCOUNTER
Spoke to pt, he is suppose to be scheduled for an egd on 2/1/19, he is on Plavix and form was faxed to his cardiologist Dr Kali Koroma ([phone]) on holding instructions. Pt is not booked yet.

## 2019-02-01 ENCOUNTER — ANESTHESIA EVENT (OUTPATIENT)
Dept: ENDOSCOPY | Facility: HOSPITAL | Age: 64
End: 2019-02-01
Payer: MEDICARE

## 2019-02-01 ENCOUNTER — ANESTHESIA (OUTPATIENT)
Dept: ENDOSCOPY | Facility: HOSPITAL | Age: 64
End: 2019-02-01
Payer: MEDICARE

## 2019-02-01 ENCOUNTER — TELEPHONE (OUTPATIENT)
Dept: ENDOSCOPY | Facility: HOSPITAL | Age: 64
End: 2019-02-01

## 2019-02-01 ENCOUNTER — TELEPHONE (OUTPATIENT)
Dept: GASTROENTEROLOGY | Facility: CLINIC | Age: 64
End: 2019-02-01

## 2019-02-01 PROBLEM — R13.10 DYSPHAGIA: Status: ACTIVE | Noted: 2019-02-01

## 2019-02-01 NOTE — TELEPHONE ENCOUNTER
Patient's EGD today was cancelled because he ate this morning.  States he did not receive fasting instructions.  Spoke with patient and rescheduled procedure 2/5/19 at 1130.  He has held Plavix for today's procedure with permission from Dr Kali Koroma (scanned to media tab) and will continue to hold Plavix.  Instructions discussed and handed to patient.

## 2019-02-05 ENCOUNTER — ANESTHESIA (OUTPATIENT)
Dept: ENDOSCOPY | Facility: HOSPITAL | Age: 64
End: 2019-02-05
Payer: MEDICARE

## 2019-02-05 ENCOUNTER — ANESTHESIA EVENT (OUTPATIENT)
Dept: ENDOSCOPY | Facility: HOSPITAL | Age: 64
End: 2019-02-05
Payer: MEDICARE

## 2019-02-05 ENCOUNTER — HOSPITAL ENCOUNTER (OUTPATIENT)
Facility: HOSPITAL | Age: 64
Discharge: HOME | End: 2019-02-05
Attending: INTERNAL MEDICINE | Admitting: INTERNAL MEDICINE
Payer: MEDICARE

## 2019-02-05 VITALS
SYSTOLIC BLOOD PRESSURE: 136 MMHG | BODY MASS INDEX: 28.12 KG/M2 | WEIGHT: 175 LBS | TEMPERATURE: 98 F | RESPIRATION RATE: 16 BRPM | OXYGEN SATURATION: 97 % | DIASTOLIC BLOOD PRESSURE: 83 MMHG | HEART RATE: 61 BPM | HEIGHT: 66 IN

## 2019-02-05 DIAGNOSIS — R13.10 DYSPHAGIA: ICD-10-CM

## 2019-02-05 DIAGNOSIS — R13.19 ESOPHAGEAL DYSPHAGIA: Primary | ICD-10-CM

## 2019-02-05 PROCEDURE — 37000008 HC ANESTHESIA 1ST 15 MINUTES: Performed by: INTERNAL MEDICINE

## 2019-02-05 PROCEDURE — 25000003 PHARM REV CODE 250: Performed by: INTERNAL MEDICINE

## 2019-02-05 PROCEDURE — 43248 EGD GUIDE WIRE INSERTION: CPT | Performed by: INTERNAL MEDICINE

## 2019-02-05 PROCEDURE — 37000009 HC ANESTHESIA EA ADD 15 MINS: Performed by: INTERNAL MEDICINE

## 2019-02-05 PROCEDURE — D9220A PRA ANESTHESIA: ICD-10-PCS | Mod: ANES,,, | Performed by: ANESTHESIOLOGY

## 2019-02-05 PROCEDURE — D9220A PRA ANESTHESIA: ICD-10-PCS | Mod: CRNA,,, | Performed by: NURSE ANESTHETIST, CERTIFIED REGISTERED

## 2019-02-05 PROCEDURE — 43248 PR EGD, FLEX, W/DILATION OVER GUIDEWIRE: ICD-10-PCS | Mod: ,,, | Performed by: INTERNAL MEDICINE

## 2019-02-05 PROCEDURE — D9220A PRA ANESTHESIA: Mod: ANES,,, | Performed by: ANESTHESIOLOGY

## 2019-02-05 PROCEDURE — 63600175 PHARM REV CODE 636 W HCPCS: Performed by: NURSE ANESTHETIST, CERTIFIED REGISTERED

## 2019-02-05 PROCEDURE — D9220A PRA ANESTHESIA: Mod: CRNA,,, | Performed by: NURSE ANESTHETIST, CERTIFIED REGISTERED

## 2019-02-05 PROCEDURE — 43248 EGD GUIDE WIRE INSERTION: CPT | Mod: ,,, | Performed by: INTERNAL MEDICINE

## 2019-02-05 RX ORDER — PROPOFOL 10 MG/ML
VIAL (ML) INTRAVENOUS
Status: DISCONTINUED | OUTPATIENT
Start: 2019-02-05 | End: 2019-02-05

## 2019-02-05 RX ORDER — SODIUM CHLORIDE 0.9 % (FLUSH) 0.9 %
3 SYRINGE (ML) INJECTION
Status: DISCONTINUED | OUTPATIENT
Start: 2019-02-05 | End: 2019-02-05 | Stop reason: HOSPADM

## 2019-02-05 RX ORDER — LIDOCAINE HCL/PF 100 MG/5ML
SYRINGE (ML) INTRAVENOUS
Status: DISCONTINUED | OUTPATIENT
Start: 2019-02-05 | End: 2019-02-05

## 2019-02-05 RX ORDER — PROPOFOL 10 MG/ML
VIAL (ML) INTRAVENOUS CONTINUOUS PRN
Status: DISCONTINUED | OUTPATIENT
Start: 2019-02-05 | End: 2019-02-05

## 2019-02-05 RX ORDER — MIDAZOLAM HYDROCHLORIDE 1 MG/ML
INJECTION, SOLUTION INTRAMUSCULAR; INTRAVENOUS
Status: DISCONTINUED | OUTPATIENT
Start: 2019-02-05 | End: 2019-02-05

## 2019-02-05 RX ORDER — SODIUM CHLORIDE 9 MG/ML
INJECTION, SOLUTION INTRAVENOUS CONTINUOUS
Status: DISCONTINUED | OUTPATIENT
Start: 2019-02-05 | End: 2019-02-05 | Stop reason: HOSPADM

## 2019-02-05 RX ADMIN — SODIUM CHLORIDE: 0.9 INJECTION, SOLUTION INTRAVENOUS at 11:02

## 2019-02-05 RX ADMIN — LIDOCAINE HYDROCHLORIDE 75 MG: 20 INJECTION, SOLUTION INTRAVENOUS at 12:02

## 2019-02-05 RX ADMIN — PROPOFOL 125 MCG/KG/MIN: 10 INJECTION, EMULSION INTRAVENOUS at 12:02

## 2019-02-05 RX ADMIN — MIDAZOLAM HYDROCHLORIDE 2 MG: 1 INJECTION, SOLUTION INTRAMUSCULAR; INTRAVENOUS at 12:02

## 2019-02-05 RX ADMIN — PROPOFOL 50 MG: 10 INJECTION, EMULSION INTRAVENOUS at 12:02

## 2019-02-05 NOTE — TRANSFER OF CARE
"Anesthesia Transfer of Care Note    Patient: Raymundo Restrepo    Procedure(s) Performed: Procedure(s) (LRB):  EGD (ESOPHAGOGASTRODUODENOSCOPY) (N/A)    Patient location: PACU    Anesthesia Type: general    Transport from OR: Transported from OR on 2-3 L/min O2 by NC with adequate spontaneous ventilation    Post pain: adequate analgesia    Post assessment: no apparent anesthetic complications    Post vital signs: stable    Level of consciousness: sedated    Nausea/Vomiting: no nausea/vomiting    Complications: none    Transfer of care protocol was followed      Last vitals:   Visit Vitals  BP (!) 148/83 (BP Location: Left arm, Patient Position: Sitting)   Pulse 65   Temp 36.6 °C (97.8 °F) (Oral)   Resp 16   Ht 5' 6" (1.676 m)   Wt 79.4 kg (175 lb)   SpO2 100%   BMI 28.25 kg/m²     "

## 2019-02-05 NOTE — PLAN OF CARE
Discharge instructions discussed with patient. Pt verbalizes understanding. Consents in chart, vital signs stable, no complaints.

## 2019-02-05 NOTE — DISCHARGE INSTRUCTIONS

## 2019-02-05 NOTE — ANESTHESIA PREPROCEDURE EVALUATION
02/05/2019  Raymundo Restrepo is a 64 y.o., male.    Anesthesia Evaluation    I have reviewed the Patient Summary Reports.     I have reviewed the Medications.     Review of Systems  Anesthesia Hx:  No problems with previous Anesthesia Denies Hx of Anesthetic complications  History of prior surgery of interest to airway management or planning:  Denies Personal Hx of Anesthesia complications.   Social:  Non-Smoker    Hematology/Oncology:  Hematology Normal       -- Cancer in past history:    EENT/Dental:EENT/Dental Normal   Cardiovascular:   Exercise tolerance: good Hypertension CAD asymptomatic CABG/stent   Denies Angina.        Pulmonary:   Denies Shortness of breath.    Renal/:   Chronic Renal Disease Kidney transplant 2010   Hepatic/GI:   Hiatal Hernia, GERD, well controlled Dysphagia; feels well today.   Musculoskeletal:  Musculoskeletal Normal    Neurological:   TIA,    Endocrine:  Endocrine Normal    Dermatological:  Skin Normal    Psych:  Psychiatric Normal           Physical Exam  General:  Well nourished    Airway/Jaw/Neck:  Airway Findings: Mouth Opening: Normal Tongue: Normal  General Airway Assessment: Adult  Mallampati: II  TM Distance: Normal, at least 6 cm      Dental:  Dental Findings: In tact   Chest/Lungs:  Chest/Lungs Findings: Normal Respiratory Rate, Clear to auscultation     Heart/Vascular:  Heart Findings: Rate: Normal  Rhythm: Regular Rhythm  Sounds: Normal        Mental Status:  Mental Status Findings:  Cooperative, Alert and Oriented         Anesthesia Plan  Type of Anesthesia, risks & benefits discussed:  Anesthesia Type:  general  Patient's Preference:   Intra-op Monitoring Plan: standard ASA monitors  Intra-op Monitoring Plan Comments:   Post Op Pain Control Plan: multimodal analgesia  Post Op Pain Control Plan Comments:   Induction:   IV  Beta Blocker:  Patient is on a Beta-Blocker  and has received one dose within the past 24 hours (No further documentation required).       Informed Consent: Patient understands risks and agrees with Anesthesia plan.  Questions answered. Anesthesia consent signed with patient.  ASA Score: 3     Day of Surgery Review of History & Physical:    H&P update referred to the provider.         Ready For Surgery From Anesthesia Perspective.

## 2019-02-05 NOTE — H&P
Short Stay Endoscopy History and Physical    PCP - Eulalio Cardenas MD  Referring Physician - Jean Crump MD  [address] [place] [address] [place]  [place] [id number]    Procedure - EGD  ASA - per anesthesia  Mallampati - per anesthesia  History of Anesthesia problems - no  Family history Anesthesia problems -  no   Plan of anesthesia - General    HPI:  This is a 64 y.o. male here for evaluation of: dysphagia    Reflux - no  Dysphagia - POS  Abdominal pain - no  Diarrhea - no    ROS:  Constitutional: No fevers, chills, No weight loss  CV: No chest pain  Pulm: No cough, No shortness of breath  Ophtho: No vision changes  GI: see HPI  Derm: No rash    Medical History:  has a past medical history of CAD (coronary artery disease), native coronary artery , -donor kidney transplant (2010), Gross hematuria, Hiatal hernia, Hypertension (9/15/2014), Ischemic colitis, Kidney stones, Need for prophylactic immunotherapy, PKD (polycystic kidney disease), Recurrent skin cancer, Renal hypertension, Ruptured cyst of kidney, Skin cancer, and TIA (transient ischemic attack).    Surgical History:  has a past surgical history that includes Cardiac catheterization (2012); Kidney transplant (2010); and Coronary stent placement ( & ).    Family History: family history includes Cancer (age of onset: 56) in his brother; Heart disease in his father; Kidney disease in his brother, mother, and sister; Liver disease in his brother; Stroke in his father..    Social History:  reports that  has never smoked. he has never used smokeless tobacco. He reports that he does not drink alcohol or use drugs.    Review of patient's allergies indicates:   Allergen Reactions    Iodine Anaphylaxis     Other reaction(s): stop breathing  Other reaction(s): Anaphylaxis    Morphine Nausea And Vomiting       Medications:   Medications Prior to Admission   Medication Sig Dispense Refill Last Dose    allopurinol  (ZYLOPRIM) 300 MG tablet TAKE 1 TABLET BY MOUTH ONCE DAILY   2/5/2019 at Unknown time    amLODIPine (NORVASC) 2.5 MG tablet Take 2.5 mg by mouth.   2/4/2019 at Unknown time    benazepril (LOTENSIN) 5 MG tablet TAKE 1 TABLET DAILY.   2/5/2019 at Unknown time    carvedilol (COREG) 12.5 MG tablet Take 12.5 mg by mouth 2 (two) times daily.    2/5/2019 at Unknown time    fluorouracil (EFUDEX) 5 % cream AAA on face BID x 4-6 weeks. Stop if blistered, oozing, or bleeding. Use daily sun protection. 40 g 1 2/5/2019 at Unknown time    multivitamin (THERAGRAN) per tablet Take 1 tablet by mouth Daily.   2/5/2019 at Unknown time    mycophenolate (CELLCEPT) 250 mg Cap Take 1 capsule (250 mg total) by mouth 2 (two) times daily. 60 capsule 11 2/5/2019 at Unknown time    pantoprazole (PROTONIX) 40 MG tablet TAKE (1) TABLET DAILY.   2/4/2019 at Unknown time    sodium bicarbonate 650 MG tablet Take 650 mg by mouth 2 (two) times daily.   2/5/2019 at Unknown time    tacrolimus (PROGRAF) 1 MG Cap TAKE (2) CAPSULES (2 MG TOTAL) BY MOUTH EVERY MORNING AND 1 CAPSULE EVERY EVENING 90 capsule 11 2/5/2019 at Unknown time    allopurinol (ZYLOPRIM) 100 MG tablet Take 300 mg by mouth once daily.    2/1/2019 at Unknown time    benazepril (LOTENSIN) 5 MG tablet Take 1 tablet by mouth Daily.   2/1/2019 at Unknown time    carvedilol (COREG) 3.125 MG tablet TAKE (2) TABLET BY MOUTH TWICE DAILY WITH MEALS   Taking    clopidogrel (PLAVIX) 75 mg tablet Take 1 tablet by mouth Daily.   1/26/2019    clopidogrel (PLAVIX) 75 mg tablet Take 75 mg by mouth.   1/26/2019    nitroGLYCERIN (NITROSTAT) 0.4 MG SL tablet Place 0.4 mg under the tongue.   More than a month at Unknown time    pantoprazole (PROTONIX) 40 MG tablet Take 1 tablet by mouth Daily.   1/31/2019 at Unknown time    tacrolimus (PROGRAF) 0.5 MG Cap take 2 every morning and 2 every evening   Unknown at Unknown time       Physical Exam:    Vital Signs:   Vitals:    02/05/19 1105   BP:  (!) 148/83   Pulse: 65   Resp: 16   Temp: 97.8 °F (36.6 °C)       General Appearance: Well appearing in no acute distress  Eyes:    No scleral icterus  ENT: Neck supple  Lungs: CTA anteriorly  Heart:  Regular rate  Abdomen: Soft, non tender, non distended with normal bowel sounds.  Extremities: No edema  Skin: No rash    Labs:  Lab Results   Component Value Date    WBC 4.78 09/11/2017    HGB 11.7 (L) 09/11/2017    HCT 34.6 (L) 09/11/2017     09/11/2017    CHOL 143 08/28/2010    TRIG 178 (H) 08/28/2010    HDL 22 (L) 08/28/2010    ALT 22 08/05/2011    AST 19 08/05/2011     09/11/2017    K 4.8 09/11/2017     (H) 09/11/2017    CREATININE 1.8 (H) 09/11/2017    BUN 34 (H) 09/11/2017    CO2 22 (L) 09/11/2017    TSH 3.4 01/22/2008    PSA 1.2 04/23/2010    INR 1.0 11/11/2010       I have explained the risks and benefits of this endoscopic procedure to the patient including but not limited to bleeding, inflammation, infection, perforation, and death.      Jose G Tafoya MD

## 2019-02-05 NOTE — [employer] PATIENT INSTRUCTIONS
Discharge Summary/Instructions after an Endoscopic Procedure  Patient Name: Raymundo Restrepo  Patient MRN: [id number]  Patient YOB: 1955 Tuesday, February 05, 2019  Jose G Tafoya MD  RESTRICTIONS:  During your procedure today, you received medications for sedation.  These   medications may affect your judgment, balance and coordination.  Therefore,   for 24 hours, you have the following restrictions:   - DO NOT drive a car, operate machinery, make legal/financial decisions,   sign important papers or drink alcohol.    ACTIVITY:  Today: no heavy lifting, straining or running due to procedural   sedation/anesthesia.  The following day: return to full activity including work.  DIET:  Eat and drink normally unless instructed otherwise.     TREATMENT FOR COMMON SIDE EFFECTS:  - Mild abdominal pain, nausea, belching, bloating or excessive gas:  rest,   eat lightly and use a heating pad.  - Sore Throat: treat with throat lozenges and/or gargle with warm salt   water.  - Because air was used during the procedure, expelling large amounts of air   from your rectum or belching is normal.  - If a bowel prep was taken, you may not have a bowel movement for 1-3 days.    This is normal.  SYMPTOMS TO WATCH FOR AND REPORT TO YOUR PHYSICIAN:  1. Abdominal pain or bloating, other than gas cramps.  2. Chest pain.  3. Back pain.  4. Signs of infection such as: chills or fever occurring within 24 hours   after the procedure.  5. Rectal bleeding, which would show as bright red, maroon, or black stools.   (A tablespoon of blood from the rectum is not serious, especially if   hemorrhoids are present.)  6. Vomiting.  7. Weakness or dizziness.  GO DIRECTLY TO THE NEAREST EMERGENCY ROOM IF YOU HAVE ANY OF THE FOLLOWING:      Difficulty breathing              Chills and/or fever over 101 F   Persistent vomiting and/or vomiting blood   Severe abdominal pain   Severe chest pain   Black, tarry stools   Bleeding- more than one  tablespoon   Any other symptom or condition that you feel may need urgent attention  Your doctor recommends these additional instructions:  If any biopsies were taken, your doctors clinic will contact you in 1 to 2   weeks with any results.  - Discharge patient to home (ambulatory).   - Resume previous diet; Discharge to home (ambulatory); Resume outpatient   medications  - Return to primary care physician as previously scheduled.   - Continue daily PPI. Chew all food thoroughly. If dysphagia   persists/recurs-> follow-up in GI clinic.  For questions, problems or results please call your physician - Jose G Tafoya MD at Work:  [phone].  [facility], EMERGENCY ROOM PHONE NUMBER: [phone]  IF A COMPLICATION OR EMERGENCY SITUATION ARISES AND YOU ARE UNABLE TO REACH   YOUR PHYSICIAN - GO DIRECTLY TO THE EMERGENCY ROOM.  Jose G Tafoya MD  2/5/2019 12:46:34 PM  This report has been verified and signed electronically.  [employer]

## 2019-02-06 NOTE — ANESTHESIA POSTPROCEDURE EVALUATION
"Anesthesia Post Evaluation    Patient: Raymundo Restrepo    Procedure(s) Performed: Procedure(s) (LRB):  EGD (ESOPHAGOGASTRODUODENOSCOPY) (N/A)    Final Anesthesia Type: general  Patient location during evaluation: [facility]  Patient participation: Yes- Able to Participate  Level of consciousness: awake and alert and oriented  Post-procedure vital signs: reviewed and stable  Pain management: adequate  Airway patency: patent  PONV status at discharge: No PONV  Anesthetic complications: no      Cardiovascular status: blood pressure returned to baseline and hemodynamically stable  Respiratory status: unassisted, spontaneous ventilation and room air  Hydration status: euvolemic  Follow-up not needed.        Visit Vitals  /83   Pulse 61   Temp 36.7 °C (98 °F) (Temporal)   Resp 16   Ht 5' 6" (1.676 m)   Wt 79.4 kg (175 lb)   SpO2 97%   BMI 28.25 kg/m²       Pain/Red Score: Red Score: 10 (2/5/2019  1:30 PM)        "

## 2019-03-04 ENCOUNTER — OFFICE VISIT (OUTPATIENT)
Dept: DERMATOLOGY | Facility: CLINIC | Age: 64
End: 2019-03-04
Payer: MEDICARE

## 2019-03-04 VITALS — BODY MASS INDEX: 28.12 KG/M2 | RESPIRATION RATE: 16 BRPM | WEIGHT: 175 LBS | HEIGHT: 66 IN

## 2019-03-04 DIAGNOSIS — D09.9 SQUAMOUS CELL CARCINOMA IN SITU: Primary | ICD-10-CM

## 2019-03-04 DIAGNOSIS — L57.0 ACTINIC KERATOSES: ICD-10-CM

## 2019-03-04 DIAGNOSIS — L82.1 SEBORRHEIC KERATOSES: ICD-10-CM

## 2019-03-04 PROCEDURE — 99999 PR PBB SHADOW E&M-EST. PATIENT-LVL III: ICD-10-PCS | Mod: PBBFAC,,, | Performed by: DERMATOLOGY

## 2019-03-04 PROCEDURE — 99213 OFFICE O/P EST LOW 20 MIN: CPT | Mod: S$PBB,,, | Performed by: DERMATOLOGY

## 2019-03-04 PROCEDURE — 99213 PR OFFICE/OUTPT VISIT, EST, LEVL III, 20-29 MIN: ICD-10-PCS | Mod: S$PBB,,, | Performed by: DERMATOLOGY

## 2019-03-04 PROCEDURE — 99213 OFFICE O/P EST LOW 20 MIN: CPT | Mod: PBBFAC,PO | Performed by: DERMATOLOGY

## 2019-03-04 PROCEDURE — 99999 PR PBB SHADOW E&M-EST. PATIENT-LVL III: CPT | Mod: PBBFAC,,, | Performed by: DERMATOLOGY

## 2019-03-04 NOTE — PROGRESS NOTES
Subjective:       Patient ID:  Raymundo Restrepo is a 64 y.o. 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  He is still applying Efudex and the area on L neck is still crusted and inflamed    Derm Hx: high risk for development of skin cancer due to immunosuppression s/p kidney transplant   R preauricular cheek-SCCIS treated by Dr Guzman  L neck-SCCIS  L posterior neck-SCCIS  L dorsal wrist-SCCIS treated by ED&C          Past Medical History:   Diagnosis Date    CAD (coronary artery disease), native coronary artery      Stent placed 2007 In-stent restenosis, required a 2nd stent 2010    -donor kidney transplant 2010    Gross hematuria     history of gross hematuria    Hiatal hernia     Hypertension 9/15/2014    Ischemic colitis     Kidney stones     Need for prophylactic immunotherapy     PKD (polycystic kidney disease)     Recurrent skin cancer     Renal hypertension     Ruptured cyst of kidney     Skin cancer     TIA (transient ischemic attack)     x3, no residual     Review of Systems   Constitutional: Negative for fever, chills and weight loss.   Skin: Negative for daily sunscreen use, activity-related sunscreen use, sensitivity to antibiotic ointment and sensitivity to bandage adhesive.   All other systems reviewed and are negative.  Hematologic/Lymphatic: Bruises/bleeds easily.        On Plavix        Objective:    Physical Exam   Constitutional: He appears well-developed and well-nourished.   Neurological: He is alert and oriented to person, place, and time.   Psychiatric: He has a normal mood and affect.   Skin:   Areas Examined (abnormalities noted in diagram):   Head / Face Inspection Performed  Neck Inspection Performed  LUE Inspection Performed                   Diagram Legend     Erythematous scaling  macule/papule c/w actinic keratosis       Vascular papule c/w angioma      Pigmented verrucoid papule/plaque c/w seborrheic keratosis      Yellow umbilicated papule c/w sebaceous hyperplasia      Irregularly shaped tan macule c/w lentigo     1-2 mm smooth white papules consistent with Milia      Movable subcutaneous cyst with punctum c/w epidermal inclusion cyst      Subcutaneous movable cyst c/w pilar cyst      Firm pink to brown papule c/w dermatofibroma      Pedunculated fleshy papule(s) c/w skin tag(s)      Evenly pigmented macule c/w junctional nevus     Mildly variegated pigmented, slightly irregular-bordered macule c/w mildly atypical nevus      Flesh colored to evenly pigmented papule c/w intradermal nevus       Pink pearly papule/plaque c/w basal cell carcinoma      Erythematous hyperkeratotic cursted plaque c/w SCC      Surgical scar with no sign of skin cancer recurrence      Open and closed comedones      Inflammatory papules and pustules      Verrucoid papule consistent consistent with wart     Erythematous eczematous patches and plaques     Dystrophic onycholytic nail with subungual debris c/w onychomycosis     Umbilicated papule    Erythematous-base heme-crusted tan verrucoid plaque consistent with inflamed seborrheic keratosis     Erythematous Silvery Scaling Plaque c/w Psoriasis     See annotation      Assessment / Plan:        Squamous cell carcinoma in situ  Bx proven SCCIS on L neck is still inflamed (s/p Efudex application x almost 2 months, still applying).  Continue applying for another 4 weeks if tolerated.  Pt states it is not intolerable so will continue to apply.  He will stop in 4 weeks.  Then will return for evaluation in 8 weeks    Actinic keratoses  Ok to stop applying (treated x 2 months)    Seborrheic keratoses  These are benign inherited growths without a malignant potential. Reassurance given to patient. No treatment is necessary.              Follow-up in about 8 weeks (around  4/29/2019).

## 2019-05-07 ENCOUNTER — OFFICE VISIT (OUTPATIENT)
Dept: DERMATOLOGY | Facility: CLINIC | Age: 64
End: 2019-05-07
Payer: MEDICARE

## 2019-05-07 VITALS — BODY MASS INDEX: 28.14 KG/M2 | HEIGHT: 66 IN | WEIGHT: 175.06 LBS

## 2019-05-07 DIAGNOSIS — D09.9 SQUAMOUS CELL CARCINOMA IN SITU: ICD-10-CM

## 2019-05-07 DIAGNOSIS — L57.0 ACTINIC KERATOSES: Primary | ICD-10-CM

## 2019-05-07 PROCEDURE — 99999 PR PBB SHADOW E&M-EST. PATIENT-LVL II: CPT | Mod: PBBFAC,,, | Performed by: DERMATOLOGY

## 2019-05-07 PROCEDURE — 99999 PR PBB SHADOW E&M-EST. PATIENT-LVL II: ICD-10-PCS | Mod: PBBFAC,,, | Performed by: DERMATOLOGY

## 2019-05-07 PROCEDURE — 99212 OFFICE O/P EST SF 10 MIN: CPT | Mod: PBBFAC,PO | Performed by: DERMATOLOGY

## 2019-05-07 PROCEDURE — 99213 OFFICE O/P EST LOW 20 MIN: CPT | Mod: S$PBB,,, | Performed by: DERMATOLOGY

## 2019-05-07 PROCEDURE — 99213 PR OFFICE/OUTPT VISIT, EST, LEVL III, 20-29 MIN: ICD-10-PCS | Mod: S$PBB,,, | Performed by: DERMATOLOGY

## 2019-05-07 NOTE — PROGRESS NOTES
Subjective:       Patient ID:  Raymundo Restrepo is a 64 y.o. male who presents for   Chief Complaint   Patient presents with    Follow-up     Last OV 2019  63 y/o M present for Efudex follow up. Stopped applying Efudex .    Derm Hx: high risk for development of skin cancer due to immunosuppression s/p kidney transplant   R preauricular cheek-superior-HAK-treating with Efudex  R preauricular cheek-inferior-SCCIS treated by Dr Guzman  L neck-SCCIS  L posterior neck-SCCIS  L dorsal wrist-SCCIS treated by ED&C        Past Medical History:   Diagnosis Date    CAD (coronary artery disease), native coronary artery      Stent placed 2007 In-stent restenosis, required a 2nd stent 2010    -donor kidney transplant 2010    Gross hematuria     history of gross hematuria    Hiatal hernia     Hypertension 9/15/2014    Ischemic colitis     Kidney stones     Need for prophylactic immunotherapy     PKD (polycystic kidney disease)     Recurrent skin cancer     Renal hypertension     Ruptured cyst of kidney     Skin cancer     TIA (transient ischemic attack)     x3, no residual     Review of Systems   Constitutional: Negative for fever, chills and fatigue.   Skin: Negative for itching, rash, dry skin, daily sunscreen use, activity-related sunscreen use and wears hat.        Objective:    Physical Exam   Constitutional: He appears well-developed and well-nourished.   Neurological: He is alert and oriented to person, place, and time.   Psychiatric: He has a normal mood and affect.   Skin:   Areas Examined (abnormalities noted in diagram):   Head / Face Inspection Performed  Neck Inspection Performed  RUE Inspected  LUE Inspection Performed                   Diagram Legend     Erythematous scaling macule/papule c/w actinic keratosis       Vascular papule c/w angioma      Pigmented verrucoid papule/plaque c/w seborrheic keratosis      Yellow umbilicated papule c/w sebaceous  hyperplasia      Irregularly shaped tan macule c/w lentigo     1-2 mm smooth white papules consistent with Milia      Movable subcutaneous cyst with punctum c/w epidermal inclusion cyst      Subcutaneous movable cyst c/w pilar cyst      Firm pink to brown papule c/w dermatofibroma      Pedunculated fleshy papule(s) c/w skin tag(s)      Evenly pigmented macule c/w junctional nevus     Mildly variegated pigmented, slightly irregular-bordered macule c/w mildly atypical nevus      Flesh colored to evenly pigmented papule c/w intradermal nevus       Pink pearly papule/plaque c/w basal cell carcinoma      Erythematous hyperkeratotic cursted plaque c/w SCC      Surgical scar with no sign of skin cancer recurrence      Open and closed comedones      Inflammatory papules and pustules      Verrucoid papule consistent consistent with wart     Erythematous eczematous patches and plaques     Dystrophic onycholytic nail with subungual debris c/w onychomycosis     Umbilicated papule    Erythematous-base heme-crusted tan verrucoid plaque consistent with inflamed seborrheic keratosis     Erythematous Silvery Scaling Plaque c/w Psoriasis     See annotation      Assessment / Plan:        Hypertrophic Actinic keratoses  Squamous cell carcinoma in situ  Bx proven SCCIS on L neck is resolved s/p 3 months of Efudex application.      Efudex x 4 weeks to R preauricular HAK.  If no resolution, consider Mohs with Dr Guzman. Pt was hesitant to have surgery in the past and chose to try Efudex first         Follow up in about 6 weeks (around 6/18/2019).

## 2019-06-24 ENCOUNTER — OFFICE VISIT (OUTPATIENT)
Dept: DERMATOLOGY | Facility: CLINIC | Age: 64
End: 2019-06-24
Payer: MEDICARE

## 2019-06-24 VITALS — HEIGHT: 66 IN | RESPIRATION RATE: 18 BRPM | BODY MASS INDEX: 28.12 KG/M2 | WEIGHT: 175 LBS

## 2019-06-24 DIAGNOSIS — Z85.828 PERSONAL HISTORY OF MALIGNANT NEOPLASM OF SKIN: ICD-10-CM

## 2019-06-24 DIAGNOSIS — L57.0 ACTINIC KERATOSES: Primary | ICD-10-CM

## 2019-06-24 PROCEDURE — 99999 PR PBB SHADOW E&M-EST. PATIENT-LVL III: ICD-10-PCS | Mod: PBBFAC,,, | Performed by: DERMATOLOGY

## 2019-06-24 PROCEDURE — 99213 OFFICE O/P EST LOW 20 MIN: CPT | Mod: PBBFAC,PO | Performed by: DERMATOLOGY

## 2019-06-24 PROCEDURE — 99213 PR OFFICE/OUTPT VISIT, EST, LEVL III, 20-29 MIN: ICD-10-PCS | Mod: S$PBB,,, | Performed by: DERMATOLOGY

## 2019-06-24 PROCEDURE — 99213 OFFICE O/P EST LOW 20 MIN: CPT | Mod: S$PBB,,, | Performed by: DERMATOLOGY

## 2019-06-24 PROCEDURE — 99999 PR PBB SHADOW E&M-EST. PATIENT-LVL III: CPT | Mod: PBBFAC,,, | Performed by: DERMATOLOGY

## 2019-06-24 NOTE — PROGRESS NOTES
Subjective:       Patient ID:  Raymundo Restrepo is a 64 y.o. male who presents for   Chief Complaint   Patient presents with    Follow-up    Lesion     Last OV 5--19  Lesions on face - treating w/ Efudex x 3 weeks to R medial cheek and L temple.  The areas are bleeding and inflamed.  He just started applying to R temple (only mild inflammation)    Derm Hx: high risk for development of skin cancer due to immunosuppression s/p kidney transplant   R preauricular cheek-superior-HAK-treating with Efudex  R preauricular cheek-inferior-SCCIS treated by Dr Guzman  L neck-SCCIS  L posterior neck-SCCIS  L dorsal wrist-SCCIS treated by ED&C          Past Medical History:   Diagnosis Date    CAD (coronary artery disease), native coronary artery      Stent placed 2007 In-stent restenosis, required a 2nd stent 2010    -donor kidney transplant 2010    Gross hematuria     history of gross hematuria    Hiatal hernia     Hypertension 9/15/2014    Ischemic colitis     Kidney stones     Need for prophylactic immunotherapy     PKD (polycystic kidney disease)     Recurrent skin cancer     Renal hypertension     Ruptured cyst of kidney     Skin cancer     TIA (transient ischemic attack)     x3, no residual     Review of Systems   Constitutional: Negative for fever, chills and fatigue.   Skin: Negative for itching, rash, dry skin, daily sunscreen use, activity-related sunscreen use and wears hat.   Hematologic/Lymphatic: Bruises/bleeds easily.        Objective:    Physical Exam   Constitutional: He appears well-developed and well-nourished.   Neurological: He is alert and oriented to person, place, and time.   Psychiatric: He has a normal mood and affect.   Skin:   Areas Examined (abnormalities noted in diagram):   Scalp / Hair Palpated and Inspected  Head / Face Inspection Performed  Neck Inspection Performed              Diagram Legend     Erythematous scaling macule/papule c/w actinic  keratosis       Vascular papule c/w angioma      Pigmented verrucoid papule/plaque c/w seborrheic keratosis      Yellow umbilicated papule c/w sebaceous hyperplasia      Irregularly shaped tan macule c/w lentigo     1-2 mm smooth white papules consistent with Milia      Movable subcutaneous cyst with punctum c/w epidermal inclusion cyst      Subcutaneous movable cyst c/w pilar cyst      Firm pink to brown papule c/w dermatofibroma      Pedunculated fleshy papule(s) c/w skin tag(s)      Evenly pigmented macule c/w junctional nevus     Mildly variegated pigmented, slightly irregular-bordered macule c/w mildly atypical nevus      Flesh colored to evenly pigmented papule c/w intradermal nevus       Pink pearly papule/plaque c/w basal cell carcinoma      Erythematous hyperkeratotic cursted plaque c/w SCC      Surgical scar with no sign of skin cancer recurrence      Open and closed comedones      Inflammatory papules and pustules      Verrucoid papule consistent consistent with wart     Erythematous eczematous patches and plaques     Dystrophic onycholytic nail with subungual debris c/w onychomycosis     Umbilicated papule    Erythematous-base heme-crusted tan verrucoid plaque consistent with inflamed seborrheic keratosis     Erythematous Silvery Scaling Plaque c/w Psoriasis     See annotation      Assessment / Plan:        Actinic keratoses  Ok to stop Efudex application to R cheek and L temple  Continue to R temple for another 2-3 weeks    Personal history of malignant neoplasm of skin  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

## 2019-06-26 DIAGNOSIS — Z94.0 KIDNEY REPLACED BY TRANSPLANT: ICD-10-CM

## 2019-06-26 RX ORDER — TACROLIMUS 1 MG/1
CAPSULE ORAL
Qty: 60 CAPSULE | Refills: 11 | Status: SHIPPED | OUTPATIENT
Start: 2019-06-26 | End: 2019-09-24 | Stop reason: SDUPTHER

## 2019-09-03 DIAGNOSIS — Z94.0 DECEASED-DONOR KIDNEY TRANSPLANT: Chronic | ICD-10-CM

## 2019-09-03 DIAGNOSIS — Z29.89 NEED FOR PROPHYLACTIC IMMUNOTHERAPY: Chronic | ICD-10-CM

## 2019-09-03 RX ORDER — MYCOPHENOLATE MOFETIL 250 MG/1
CAPSULE ORAL
Qty: 60 CAPSULE | Refills: 11 | Status: SHIPPED | OUTPATIENT
Start: 2019-09-03 | End: 2020-06-02 | Stop reason: SDUPTHER

## 2019-09-24 ENCOUNTER — OFFICE VISIT (OUTPATIENT)
Dept: DERMATOLOGY | Facility: CLINIC | Age: 64
End: 2019-09-24
Payer: MEDICARE

## 2019-09-24 VITALS — WEIGHT: 175.06 LBS | BODY MASS INDEX: 28.14 KG/M2 | HEIGHT: 66 IN | RESPIRATION RATE: 18 BRPM

## 2019-09-24 DIAGNOSIS — L73.8 SEBACEOUS HYPERPLASIA: ICD-10-CM

## 2019-09-24 DIAGNOSIS — Z85.828 PERSONAL HISTORY OF MALIGNANT NEOPLASM OF SKIN: ICD-10-CM

## 2019-09-24 DIAGNOSIS — L57.0 ACTINIC KERATOSES: Primary | ICD-10-CM

## 2019-09-24 PROCEDURE — 99213 PR OFFICE/OUTPT VISIT, EST, LEVL III, 20-29 MIN: ICD-10-PCS | Mod: S$PBB,,, | Performed by: DERMATOLOGY

## 2019-09-24 PROCEDURE — 99213 OFFICE O/P EST LOW 20 MIN: CPT | Mod: PBBFAC,PO | Performed by: DERMATOLOGY

## 2019-09-24 PROCEDURE — 99213 OFFICE O/P EST LOW 20 MIN: CPT | Mod: S$PBB,,, | Performed by: DERMATOLOGY

## 2019-09-24 PROCEDURE — 99999 PR PBB SHADOW E&M-EST. PATIENT-LVL III: ICD-10-PCS | Mod: PBBFAC,,, | Performed by: DERMATOLOGY

## 2019-09-24 PROCEDURE — 99999 PR PBB SHADOW E&M-EST. PATIENT-LVL III: CPT | Mod: PBBFAC,,, | Performed by: DERMATOLOGY

## 2019-09-24 NOTE — PROGRESS NOTES
Subjective:       Patient ID:  Raymundo Restrepo is a 64 y.o. male who presents for   Chief Complaint   Patient presents with    Spot     HPI    63 yo M presents for follow up. Last OV 19.  He has not noticed any new or changing lesions since last OV and has not applied Efudex since last office visit.     Derm Hx: high risk for development of skin cancer due to immunosuppression s/p kidney transplant   R preauricular cheek-superior-HAK-treating with Efudex  R preauricular cheek-inferior-SCCIS treated by Dr Guzman  L neck-SCCIS  L posterior neck-SCCIS  L dorsal wrist-SCCIS treated by ED&C     Past Medical History:   Diagnosis Date    CAD (coronary artery disease), native coronary artery      Stent placed 2007 In-stent restenosis, required a 2nd stent 2010    -donor kidney transplant 2010    Gross hematuria     history of gross hematuria    Hiatal hernia     Hypertension 9/15/2014    Ischemic colitis     Kidney stones     Need for prophylactic immunotherapy     PKD (polycystic kidney disease)     Recurrent skin cancer     Renal hypertension     Ruptured cyst of kidney     Skin cancer     TIA (transient ischemic attack)     x3, no residual     Review of Systems   Skin: Positive for activity-related sunscreen use. Negative for daily sunscreen use and wears hat.   Hematologic/Lymphatic: Bruises/bleeds easily.        Objective:    Physical Exam   Constitutional: He appears well-developed and well-nourished.   Neurological: He is alert and oriented to person, place, and time.   Psychiatric: He has a normal mood and affect.   Skin:   Areas Examined (abnormalities noted in diagram):   Scalp / Hair Palpated and Inspected  Head / Face Inspection Performed  Neck Inspection Performed  RUE Inspected  LUE Inspection Performed                   Diagram Legend     Erythematous scaling macule/papule c/w actinic keratosis       Vascular papule c/w angioma      Pigmented verrucoid  papule/plaque c/w seborrheic keratosis      Yellow umbilicated papule c/w sebaceous hyperplasia      Irregularly shaped tan macule c/w lentigo     1-2 mm smooth white papules consistent with Milia      Movable subcutaneous cyst with punctum c/w epidermal inclusion cyst      Subcutaneous movable cyst c/w pilar cyst      Firm pink to brown papule c/w dermatofibroma      Pedunculated fleshy papule(s) c/w skin tag(s)      Evenly pigmented macule c/w junctional nevus     Mildly variegated pigmented, slightly irregular-bordered macule c/w mildly atypical nevus      Flesh colored to evenly pigmented papule c/w intradermal nevus       Pink pearly papule/plaque c/w basal cell carcinoma      Erythematous hyperkeratotic cursted plaque c/w SCC      Surgical scar with no sign of skin cancer recurrence      Open and closed comedones      Inflammatory papules and pustules      Verrucoid papule consistent consistent with wart     Erythematous eczematous patches and plaques     Dystrophic onycholytic nail with subungual debris c/w onychomycosis     Umbilicated papule    Erythematous-base heme-crusted tan verrucoid plaque consistent with inflamed seborrheic keratosis     Erythematous Silvery Scaling Plaque c/w Psoriasis     See annotation      Assessment / Plan:        Actinic keratoses  Declined Tx of AK  He is going on vacation    Personal history of malignant neoplasm of skin  No evidence of recurrence  Encouraged sun protection while outdoors    Sebaceous hyperplasia  This is a common condition representing benign enlargement of the sebaceous lobule. It typically occurs in adulthood. Reassurance given to patient.            Follow up in about 6 months (around 3/24/2020).

## 2020-06-02 DIAGNOSIS — Z29.89 NEED FOR PROPHYLACTIC IMMUNOTHERAPY: Chronic | ICD-10-CM

## 2020-06-02 DIAGNOSIS — Z94.0 DECEASED-DONOR KIDNEY TRANSPLANT: Chronic | ICD-10-CM

## 2020-06-03 DIAGNOSIS — Z94.0 DECEASED-DONOR KIDNEY TRANSPLANT: Chronic | ICD-10-CM

## 2020-06-03 DIAGNOSIS — Z29.89 NEED FOR PROPHYLACTIC IMMUNOTHERAPY: Chronic | ICD-10-CM

## 2020-06-03 RX ORDER — MYCOPHENOLATE MOFETIL 250 MG/1
250 CAPSULE ORAL 2 TIMES DAILY
Qty: 60 CAPSULE | Refills: 11 | Status: SHIPPED | OUTPATIENT
Start: 2020-06-03 | End: 2020-06-03 | Stop reason: SDUPTHER

## 2020-06-03 RX ORDER — TACROLIMUS 1 MG/1
1 CAPSULE ORAL EVERY 12 HOURS
Qty: 60 CAPSULE | Refills: 11 | Status: SHIPPED | OUTPATIENT
Start: 2020-06-03 | End: 2020-06-03 | Stop reason: SDUPTHER

## 2020-06-03 RX ORDER — MYCOPHENOLATE MOFETIL 250 MG/1
250 CAPSULE ORAL 2 TIMES DAILY
Qty: 60 CAPSULE | Refills: 11 | Status: SHIPPED | OUTPATIENT
Start: 2020-06-03 | End: 2021-05-31 | Stop reason: DRUGHIGH

## 2020-06-03 RX ORDER — TACROLIMUS 1 MG/1
1 CAPSULE ORAL EVERY 12 HOURS
Qty: 60 CAPSULE | Refills: 11 | Status: SHIPPED | OUTPATIENT
Start: 2020-06-03 | End: 2021-07-01

## 2020-08-11 ENCOUNTER — PATIENT MESSAGE (OUTPATIENT)
Dept: DERMATOLOGY | Facility: CLINIC | Age: 65
End: 2020-08-11

## 2020-08-11 DIAGNOSIS — D09.9 SQUAMOUS CELL CARCINOMA IN SITU: ICD-10-CM

## 2020-08-14 ENCOUNTER — TELEPHONE (OUTPATIENT)
Dept: DERMATOLOGY | Facility: CLINIC | Age: 65
End: 2020-08-14

## 2020-08-17 ENCOUNTER — TELEPHONE (OUTPATIENT)
Dept: DERMATOLOGY | Facility: CLINIC | Age: 65
End: 2020-08-17

## 2020-08-17 NOTE — TELEPHONE ENCOUNTER
Spoke to pt and he would like to see Dr. Moody for refill or office visit. Per Dr. Lugo she prefer Dr Peter.  Thanks,    JT

## 2020-08-19 RX ORDER — FLUOROURACIL 50 MG/G
CREAM TOPICAL
Qty: 40 G | Refills: 1 | Status: SHIPPED | OUTPATIENT
Start: 2020-08-19 | End: 2023-12-18 | Stop reason: ALTCHOICE

## 2020-08-24 LAB
EXT ALBUMIN: 4.6
EXT ALKALINE PHOSPHATASE: 68
EXT ALLOSURE: ABNORMAL
EXT ALT: 23
EXT AMYLASE: ABNORMAL
EXT ANC: ABNORMAL
EXT AST: 25
EXT BACTERIA UA: 0
EXT BANDS%: ABNORMAL
EXT BILIRUBIN DIRECT: 0.11 MG/DL
EXT BILIRUBIN TOTAL: 0.4
EXT BK VIRUS DNA QN PCR: ABNORMAL
EXT BK VIRUS DNA QUANT, PCR, URINE: ABNORMAL
EXT BUN: 31
EXT C PEPTIDE: ABNORMAL
EXT CALCIUM: 9.7
EXT CHLORIDE: 109
EXT CHOLESTEROL (LIPID PANEL): ABNORMAL
EXT CHOLESTEROL: ABNORMAL
EXT CMV DNA QUANT. BY PCR: ABNORMAL
EXT CO2: 17
EXT CREATININE: 1.68 MG/DL
EXT CYCLOSPORINE LVL: ABNORMAL
EXT EBV DNA BY PCR: ABNORMAL
EXT EBV DNA-COPIES/ML: ABNORMAL
EXT EOSINOPHIL%: 4
EXT FERRITIN: ABNORMAL
EXT GFR MDRD AF AMER: ABNORMAL
EXT GFR MDRD NON AF AMER: 42
EXT GLUCOSE UA: ABNORMAL
EXT GLUCOSE: 116
EXT HBV DNA QUANT PCR: ABNORMAL
EXT HCV QUANT: ABNORMAL
EXT HDL: ABNORMAL
EXT HEMATOCRIT: 34.5
EXT HEMOGLOBIN A1C: ABNORMAL
EXT HEMOGLOBIN: 11.1
EXT HEP B S AG: ABNORMAL
EXT HIV: ABNORMAL
EXT IMMUNKNOW (STIMULATED): ABNORMAL
EXT INR: ABNORMAL
EXT IRON SATURATION: ABNORMAL
EXT LDH, TOTAL: ABNORMAL
EXT LDL CHOLESTEROL: ABNORMAL
EXT LIPASE: ABNORMAL
EXT LYMPH%: 24
EXT MAGNESIUM: ABNORMAL
EXT MONOCYTES%: 7
EXT NITRITES UA: ABNORMAL
EXT PHOSPHORUS: 3.3
EXT PLATELETS: 193
EXT POTASSIUM: 5.6
EXT PROT/CREAT RATIO UR: ABNORMAL
EXT PROTEIN TOTAL: 6.7
EXT PROTEIN UA: ABNORMAL
EXT PT: ABNORMAL
EXT PTH, INTACT: ABNORMAL
EXT RBC UA: ABNORMAL
EXT SARS COV-2 (COVID-19): ABNORMAL
EXT SEGS%: 65
EXT SERUM IRON: ABNORMAL
EXT SIROLIMUS LVL: ABNORMAL
EXT SODIUM: 140 MMOL/L
EXT STOOL CDIFF: ABNORMAL
EXT STOOL CMV: ABNORMAL
EXT STOOL CULTURE: ABNORMAL
EXT STOOL OCP: ABNORMAL
EXT TACROLIMUS LVL: ABNORMAL
EXT TIBC: ABNORMAL
EXT TRIGLYCERIDES: ABNORMAL
EXT UNSATURATED IRON BINDING CAP.: ABNORMAL
EXT URIC ACID: ABNORMAL
EXT URINE CULTURE: ABNORMAL
EXT VIT D 25 HYDROXY: ABNORMAL
EXT WBC UA: ABNORMAL
EXT WBC: 5.7

## 2020-08-25 ENCOUNTER — DOCUMENTATION ONLY (OUTPATIENT)
Dept: TRANSPLANT | Facility: CLINIC | Age: 65
End: 2020-08-25

## 2020-08-27 ENCOUNTER — TELEPHONE (OUTPATIENT)
Dept: TRANSPLANT | Facility: CLINIC | Age: 65
End: 2020-08-27

## 2020-08-27 DIAGNOSIS — Z94.0 KIDNEY REPLACED BY TRANSPLANT: Primary | ICD-10-CM

## 2020-08-27 LAB
ALBUMIN SERPL-MCNC: 4.6 G/DL (ref 3.8–4.8)
ALP SERPL-CCNC: 68 IU/L (ref 39–117)
ALT SERPL-CCNC: 23 IU/L (ref 0–44)
APPEARANCE UR: CLEAR
AST SERPL-CCNC: 25 IU/L (ref 0–40)
BACTERIA #/AREA URNS HPF: ABNORMAL /[HPF]
BASOPHILS # BLD AUTO: 0 X10E3/UL (ref 0–0.2)
BASOPHILS NFR BLD AUTO: 0 %
BILIRUB DIRECT SERPL-MCNC: 0.11 MG/DL (ref 0–0.4)
BILIRUB SERPL-MCNC: 0.4 MG/DL (ref 0–1.2)
BILIRUB UR QL STRIP: NEGATIVE
BUN SERPL-MCNC: 31 MG/DL (ref 8–27)
BUN/CREAT SERPL: 18 (ref 10–24)
CALCIUM SERPL-MCNC: 9.7 MG/DL (ref 8.6–10.2)
CASTS URNS MICRO: ABNORMAL
CASTS URNS QL MICRO: PRESENT /LPF
CHLORIDE SERPL-SCNC: 109 MMOL/L (ref 96–106)
CO2 SERPL-SCNC: 17 MMOL/L (ref 20–29)
COLOR UR: YELLOW
CREAT SERPL-MCNC: 1.68 MG/DL (ref 0.76–1.27)
CREAT UR-MCNC: 102 MG/DL
EOSINOPHIL # BLD AUTO: 0.2 X10E3/UL (ref 0–0.4)
EOSINOPHIL NFR BLD AUTO: 4 %
EPI CELLS #/AREA URNS HPF: ABNORMAL /HPF (ref 0–10)
ERYTHROCYTE [DISTWIDTH] IN BLOOD BY AUTOMATED COUNT: 13.6 % (ref 11.6–15.4)
GLUCOSE SERPL-MCNC: 116 MG/DL (ref 65–99)
GLUCOSE UR QL: NEGATIVE
HCT VFR BLD AUTO: 34.5 % (ref 37.5–51)
HGB BLD-MCNC: 11.1 G/DL (ref 13–17.7)
HGB UR QL STRIP: NEGATIVE
IMM GRANULOCYTES # BLD AUTO: 0 X10E3/UL (ref 0–0.1)
IMM GRANULOCYTES NFR BLD AUTO: 0 %
KETONES UR QL STRIP: NEGATIVE
LEUKOCYTE ESTERASE UR QL STRIP: NEGATIVE
LYMPHOCYTES # BLD AUTO: 1.4 X10E3/UL (ref 0.7–3.1)
LYMPHOCYTES NFR BLD AUTO: 24 %
MCH RBC QN AUTO: 29.2 PG (ref 26.6–33)
MCHC RBC AUTO-ENTMCNC: 32.2 G/DL (ref 31.5–35.7)
MCV RBC AUTO: 91 FL (ref 79–97)
MICRO URNS: NORMAL
MICRO URNS: NORMAL
MONOCYTES # BLD AUTO: 0.4 X10E3/UL (ref 0.1–0.9)
MONOCYTES NFR BLD AUTO: 7 %
MUCOUS THREADS URNS QL MICRO: PRESENT
NEUTROPHILS # BLD AUTO: 3.7 X10E3/UL (ref 1.4–7)
NEUTROPHILS NFR BLD AUTO: 65 %
NITRITE UR QL STRIP: NEGATIVE
PH UR STRIP: 5.5 [PH] (ref 5–7.5)
PHOSPHATE SERPL-MCNC: 3.3 MG/DL (ref 2.8–4.1)
PLATELET # BLD AUTO: 193 X10E3/UL (ref 150–450)
POTASSIUM SERPL-SCNC: 5.6 MMOL/L (ref 3.5–5.2)
PROT SERPL-MCNC: 6.7 G/DL (ref 6–8.5)
PROT UR QL STRIP: NEGATIVE
PROT UR-MCNC: 7.4 MG/DL
PROT/CREAT UR: 73 MG/G CREAT (ref 0–200)
RBC # BLD AUTO: 3.8 X10E6/UL (ref 4.14–5.8)
RBC #/AREA URNS HPF: ABNORMAL /HPF (ref 0–2)
SODIUM SERPL-SCNC: 140 MMOL/L (ref 134–144)
SP GR UR: 1.02 (ref 1–1.03)
SPECIMEN STATUS REPORT: NORMAL
TACROLIMUS BLD LC/MS/MS-MCNC: 5 NG/ML (ref 2–20)
UROBILINOGEN UR STRIP-MCNC: 0.2 MG/DL (ref 0.2–1)
WBC # BLD AUTO: 5.7 X10E3/UL (ref 3.4–10.8)
WBC #/AREA URNS HPF: ABNORMAL /HPF (ref 0–5)

## 2020-08-27 NOTE — TELEPHONE ENCOUNTER
Results reviewed with patient. Pt states potassium has been running high and was prescribed kayexalate by general nephrologist last week. Pt confirms that he will come for labs tomorrow 8/28 before his clinic appointment.       ----- Message from Cherelle Cat MD sent at 8/27/2020 11:09 AM CDT -----  Results reviewed. Labs in Care Everywhere reviewed and show b/l creat 1.2-1.5 in recent past.Please increase fluid intake and recheck renal panel in next few days.  Also, follow a low potassium diet.

## 2020-08-28 ENCOUNTER — LAB VISIT (OUTPATIENT)
Dept: LAB | Facility: HOSPITAL | Age: 65
End: 2020-08-28
Attending: INTERNAL MEDICINE
Payer: MEDICARE

## 2020-08-28 ENCOUNTER — OFFICE VISIT (OUTPATIENT)
Dept: TRANSPLANT | Facility: CLINIC | Age: 65
End: 2020-08-28
Payer: MEDICARE

## 2020-08-28 VITALS
TEMPERATURE: 98 F | OXYGEN SATURATION: 98 % | SYSTOLIC BLOOD PRESSURE: 141 MMHG | HEART RATE: 62 BPM | RESPIRATION RATE: 18 BRPM | BODY MASS INDEX: 28.67 KG/M2 | HEIGHT: 66 IN | WEIGHT: 178.38 LBS | DIASTOLIC BLOOD PRESSURE: 84 MMHG

## 2020-08-28 DIAGNOSIS — I15.0 RENOVASCULAR HYPERTENSION: Chronic | ICD-10-CM

## 2020-08-28 DIAGNOSIS — I25.10 CORONARY ARTERY DISEASE INVOLVING NATIVE CORONARY ARTERY OF NATIVE HEART WITHOUT ANGINA PECTORIS: Chronic | ICD-10-CM

## 2020-08-28 DIAGNOSIS — Z29.89 NEED FOR PROPHYLACTIC IMMUNOTHERAPY: Chronic | ICD-10-CM

## 2020-08-28 DIAGNOSIS — C44.99 RECURRENT SKIN CANCER: Chronic | ICD-10-CM

## 2020-08-28 DIAGNOSIS — Q61.3 PKD (POLYCYSTIC KIDNEY DISEASE): ICD-10-CM

## 2020-08-28 DIAGNOSIS — N18.30 STAGE 3 CHRONIC KIDNEY DISEASE: Chronic | ICD-10-CM

## 2020-08-28 DIAGNOSIS — Z94.0 KIDNEY REPLACED BY TRANSPLANT: ICD-10-CM

## 2020-08-28 DIAGNOSIS — Z94.0 DECEASED-DONOR KIDNEY TRANSPLANT: Primary | Chronic | ICD-10-CM

## 2020-08-28 LAB
ALBUMIN SERPL BCP-MCNC: 4.2 G/DL (ref 3.5–5.2)
ANION GAP SERPL CALC-SCNC: 6 MMOL/L (ref 8–16)
BUN SERPL-MCNC: 29 MG/DL (ref 8–23)
CALCIUM SERPL-MCNC: 9.8 MG/DL (ref 8.7–10.5)
CHLORIDE SERPL-SCNC: 114 MMOL/L (ref 95–110)
CO2 SERPL-SCNC: 21 MMOL/L (ref 23–29)
CREAT SERPL-MCNC: 1.5 MG/DL (ref 0.5–1.4)
EST. GFR  (AFRICAN AMERICAN): 56.1 ML/MIN/1.73 M^2
EST. GFR  (NON AFRICAN AMERICAN): 48.5 ML/MIN/1.73 M^2
GLUCOSE SERPL-MCNC: 111 MG/DL (ref 70–110)
MAGNESIUM SERPL-MCNC: 2 MG/DL (ref 1.6–2.6)
PHOSPHATE SERPL-MCNC: 3.1 MG/DL (ref 2.7–4.5)
POTASSIUM SERPL-SCNC: 5.3 MMOL/L (ref 3.5–5.1)
SODIUM SERPL-SCNC: 141 MMOL/L (ref 136–145)

## 2020-08-28 PROCEDURE — 99215 PR OFFICE/OUTPT VISIT, EST, LEVL V, 40-54 MIN: ICD-10-PCS | Mod: S$PBB,,, | Performed by: NURSE PRACTITIONER

## 2020-08-28 PROCEDURE — 99215 OFFICE O/P EST HI 40 MIN: CPT | Mod: S$PBB,,, | Performed by: NURSE PRACTITIONER

## 2020-08-28 PROCEDURE — 99215 OFFICE O/P EST HI 40 MIN: CPT | Mod: PBBFAC | Performed by: NURSE PRACTITIONER

## 2020-08-28 PROCEDURE — 80069 RENAL FUNCTION PANEL: CPT

## 2020-08-28 PROCEDURE — 99999 PR PBB SHADOW E&M-EST. PATIENT-LVL V: ICD-10-PCS | Mod: PBBFAC,,, | Performed by: NURSE PRACTITIONER

## 2020-08-28 PROCEDURE — 83735 ASSAY OF MAGNESIUM: CPT

## 2020-08-28 PROCEDURE — 36415 COLL VENOUS BLD VENIPUNCTURE: CPT

## 2020-08-28 PROCEDURE — 99999 PR PBB SHADOW E&M-EST. PATIENT-LVL V: CPT | Mod: PBBFAC,,, | Performed by: NURSE PRACTITIONER

## 2020-08-28 NOTE — LETTER
August 28, 2020        Jean Crump  [address] [place] [address]  Phone: [phone]  Fax: [phone]             [facility]  [address]  [facility] [address]  Phone: [phone]   Patient: Raymundo Restrepo   MR Number: [id number]   YOB: 1955   Date of Visit: 8/28/2020       Dear Dr. Jean Crump    Thank you for referring Raymundo Restrepo to me for evaluation. Attached you will find relevant portions of my assessment and plan of care.    If you have questions, please do not hesitate to call me. I look forward to following Raymundo Restrepo along with you.    Sincerely,    Rhiannon Echols, NP    Enclosure    If you would like to receive this communication electronically, please contact [email or web address] or [phone] to request [employer] access.    [employer] is a tool which provides read-only access to select patient information with whom you have a relationship. Its easy to use and provides real time access to review your patients record including encounter summaries, notes, results, and demographic information.    If you feel you have received this communication in error or would no longer like to receive these types of communications, please e-mail [email or web address]

## 2020-08-28 NOTE — PATIENT INSTRUCTIONS
It is my privilege to participate in your transplant care! Please be sure to let us know if you have any questions or concerns about your health care - we cannot help you if we do not know. Don't forget we are on call 24/7 for any emergencies.      Education:  Patient reminded to call with any health changes, since these can be early signs of significant complications. Also, advised the patient to be sure any new medications or changes of old medications are discussed with either a pharmacist, or physician knowledgeable with transplant to avoid rejection/drug toxicity related to significant drug interactions.     Exercise: reminded Raymundo of the importance of regular exercise for weight management, blood sugar and blood pressure management.  I also explained exercise has been shown to improve cardiovascular health, energy level, and sleep hygiene.  Lastly, I advised him that cardiovascular complications are leading cause of death for renal transplant recipients, and regular exercise can help lower this risk.     Please continue seeking general medical care with your primary care provider.    Please continue seeking care for your kidney with your General Nephrologist. This is to ensure that monitoring and care of your kidney is being addressed in a timely manner. Your General Nephrologist will also manage your blood pressure and possible electrolyte abnormalities in your lab work; ei. Potassium, phosphorus, calcium, magnesium, PTH, and Vitamin D.     Please continue care with your Dermatologist. If at any point after transplant a skin lesion was remove please update your coordinator with the information.       Lawrence Sheldon,  EZE Barbosa

## 2020-08-28 NOTE — PROGRESS NOTES
Kidney Post-Transplant Assessment    Referring Physician: Chad Mayer  Current Nephrologist: Jean Crump    ORGAN: RIGHT KIDNEY  Donor Type:  - brain death  PHS Increased Risk: no  Cold Ischemia: 814.2 mins  Induction Medications: steroids (prednisone,methylprednisolone,solumedrol,medrol,decadron), campath - alemtuzumab (anti-cd52)    Subjective:     CC:  Reassessment of renal allograft function and management of chronic immunosuppression.    HPI:  Mr. Restrepo is a 65 y.o. year old White male who received a  - brain death kidney transplant on 8/28/10.  He has CKD stage 3 - GFR 30-59 and his baseline creatinine is between 1.2-1.5. He takes mycophenolate mofetil and tacrolimus for maintenance immunosuppression. He denies any recent hospitalizations or ER visits since his previous clinic visit.    Recurrent URI. Occurred Easter . Tested for COVID was negative. Occurred again within last ten days. Takes tylenol cold and sinus and has improved    Follows with Dermatology for Melanoma. Last seen 2019. Reports new lesion to right face and posterior neck and was started on a cream but has not seen Dermatology in the office.      Nephrologist: follows every 3 months  Dermatology: seen last Nov; last lesion removal was 2019  PCP: as needed      Past Medical History:   Diagnosis Date    CAD (coronary artery disease), native coronary artery      Stent placed 2007 In-stent restenosis, required a 2nd stent 2010    -donor kidney transplant 2010    Gross hematuria     history of gross hematuria    Hiatal hernia     Hypertension 9/15/2014    Ischemic colitis     Kidney stones     Need for prophylactic immunotherapy     PKD (polycystic kidney disease)     Recurrent skin cancer     Renal hypertension     Ruptured cyst of kidney     Skin cancer     TIA (transient ischemic attack)     x3, no residual       Review of Systems   Constitutional: Negative  "for activity change, appetite change, chills, fatigue, fever and unexpected weight change.   HENT: Negative for congestion, facial swelling, postnasal drip, rhinorrhea, sinus pressure, sore throat and trouble swallowing.    Eyes: Negative for pain, redness and visual disturbance.   Respiratory: Negative for cough, chest tightness, shortness of breath and wheezing.    Cardiovascular: Negative.  Negative for chest pain, palpitations and leg swelling.   Gastrointestinal: Negative for abdominal pain, diarrhea, nausea and vomiting.   Genitourinary: Negative for dysuria, flank pain and urgency.   Musculoskeletal: Negative for gait problem, neck pain and neck stiffness.   Skin: Negative for rash.   Allergic/Immunologic: Positive for immunocompromised state. Negative for environmental allergies and food allergies.   Neurological: Negative for dizziness, weakness, light-headedness and headaches.   Psychiatric/Behavioral: Negative for agitation and confusion. The patient is not nervous/anxious.        Objective:   Blood pressure (!) 141/84, pulse 62, temperature 98.1 °F (36.7 °C), temperature source Oral, resp. rate 18, height 5' 6" (1.676 m), weight 80.9 kg (178 lb 5.6 oz), SpO2 98 %.body mass index is 28.79 kg/m².    Physical Exam  Vitals signs reviewed.   Constitutional:       Appearance: Normal appearance. He is well-developed.   HENT:      Head: Normocephalic.      Mouth/Throat:      Pharynx: No oropharyngeal exudate.   Eyes:      General: No scleral icterus.     Conjunctiva/sclera: Conjunctivae normal.      Pupils: Pupils are equal, round, and reactive to light.   Neck:      Musculoskeletal: Normal range of motion and neck supple.   Cardiovascular:      Rate and Rhythm: Normal rate and regular rhythm.      Heart sounds: Normal heart sounds.   Pulmonary:      Effort: Pulmonary effort is normal.      Breath sounds: Normal breath sounds.   Abdominal:      General: Bowel sounds are normal. There is no distension.      " Palpations: Abdomen is soft. There is no hepatomegaly, splenomegaly or mass.      Tenderness: There is no abdominal tenderness. There is no guarding or rebound. Negative signs include Gaston's sign and McBurney's sign.       Musculoskeletal: Normal range of motion.   Lymphadenopathy:      Cervical: No cervical adenopathy.   Skin:     General: Skin is warm and dry.   Neurological:      Mental Status: He is alert and oriented to person, place, and time.      Motor: No abnormal muscle tone.      Coordination: Coordination normal.   Psychiatric:         Behavior: Behavior normal.         Labs:  Lab Results   Component Value Date    WBC 5.7 08/24/2020    HGB 11.1 (L) 08/24/2020    HCT 34.5 (L) 08/24/2020     08/28/2020    K 5.3 (H) 08/28/2020     (H) 08/28/2020    CO2 21 (L) 08/28/2020    BUN 29 (H) 08/28/2020    CREATININE 1.5 (H) 08/28/2020    EGFRNONAA 48.5 (A) 08/28/2020    EGFRIFAFRICA 49 (L) 08/24/2020    CALCIUM 9.8 08/28/2020    PHOS 3.1 08/28/2020    MG 2.0 08/28/2020    ALBUMIN 4.2 08/28/2020    AST 25 08/24/2020    ALT 23 08/24/2020       Lab Results   Component Value Date    EXTANC  09/08/2014      Comment:      Coming to Clinic on 9/15/14    EXTWBC 4.7 08/10/2018    EXTSEGS 61.9 08/10/2018    EXTPLATELETS 157 08/10/2018    EXTHEMOGLOBI 11.8 (L) 08/10/2018    EXTHEMATOCRI 36.8 (L) 08/10/2018    EXTCREATININ 1.45 (H) 08/10/2018    EXTSODIUM 140 08/10/2018    EXTPOTASSIUM 4.8 08/10/2018    EXTBUN 24 (H) 08/10/2018    EXTCO2 25 08/10/2018    EXTCALCIUM 9.4 08/10/2018    EXTPHOSPHORU 3.0 08/10/2018    EXTGLUCOSE 116 (H) 08/10/2018    EXTALBUMIN 4.1 08/10/2018    EXTAST 19 08/10/2018    EXTALT 14 08/10/2018    EXTBILITOTAL 0.6 08/10/2018       Lab Results   Component Value Date    EXTTACROLVL Pending 08/24/2020    EXTPROTCRE Pending 08/24/2020    EXTPROTEINUA Neg 08/24/2020    EXTWBCUA 0-5 08/24/2020    EXTRBCUA 0-2 08/24/2020       Labs were reviewed with the patient.    Assessment:     1.  -donor kidney transplant 8/28/10    2. Stage 3 chronic kidney disease    3. Chronic immunosuppression with Prograf and Cellcept    4. PKD (polycystic kidney disease)    5. Renovascular hypertension    6. Coronary artery disease involving native coronary artery of native heart without angina pectoris    7. Recurrent skin cancer        Plan:   Encouraged low potassium diet        Melanoma --f/u with dermatology now since not on seen in clinic since new lesions occurred   Pt continues to prefers to continue MMF and not have his kidney fail, Despite the risk of melanoma reoccurrence. We discussed stopping MMF if melanoma  Becomes  Invasive,  requiring a more extensive removal. MOHs.         1. CKD stage 3: will continue follow up as per our center guidelines. patient to continue close follow up with the local General nephrologist. Education provided in appropriate fluid intake, potassium intake. Continue with oral hydration.      2. Immunosuppression: Prograf trough 5, which is therapeutic target 4-7. Continue Prograf 1/1.5 (general Nephrologist adjusted),   Mg BID  Lab Results   Component Value Date    TACROLIMUS 5.0 2020    TACROLIMUS 5.4 2017    TACROLIMUS 2.6 (L) 2013   Will closely monitor for toxicities, education provided about adherence to medicines and need to communicate any side effect to the transplant nurse or physician.      3. Allograft Function:stable at baseline for the patient. Continue follow up as per our guidelines and with the local General nephrologist. Communication will be sent today.     2020  9yr 11mo   Creatinine 0.5 - 1.4 mg/dL 1.5 (A)   eGFR if non African American >60 mL/min/1.73 m^2 48.5 (A)         4. Hypertension management:  Continue with home blood pressure monitoring, low salt and healthy life discussed with the patient..  BP Readings from Last 3 Encounters:   20 (!) 141/84   19 136/83   18 (!) 158/80   Currently takes  amlodipine, benazepril, and coreg  --deferred management to General Nephrology      5. Metabolic Bone Disease/Secondary Hyperparathyroidism:calcium and phosphorus level discussed with the patient, patient will continue follow up with the general nephrologist for management of metabolic bone disease calcium and phosphorus as per our center protocol. Will monitor PTH, Vit D level, calcium.   Lab Results   Component Value Date    CALCIUM 9.8 08/28/2020    PHOS 3.1 08/28/2020    PHOS 3.3 08/24/2020    PHOS 2.5 (L) 09/11/2017       6. Electrolytes: reviewed with the patient, essentially within the normal range no need for acute changes today, will monitor as per our center guidelines.  Lab Results   Component Value Date     08/28/2020    K 5.3 (H) 08/28/2020     (H) 08/28/2020    CO2 21 (L) 08/28/2020    CO2 17 (L) 08/24/2020    CO2 22 (L) 09/11/2017       7. Anemia: will continue monitoring as per our center guidelines. No indication for acute intervention today.  Lab Results   Component Value Date    WBC 5.7 08/24/2020    HGB 11.1 (L) 08/24/2020    HCT 34.5 (L) 08/24/2020    MCV 91 08/24/2020     08/24/2020       8.Proteinuria: will continue with pr/cr ratio as per our center guidelines  Lab Results   Component Value Date    PROTEINURINE <7 09/15/2014    CREATRANDUR 102.0 08/24/2020    UTPCR Unable to calculate 09/15/2014    UTPCR 0.22 08/28/2010        9. BK virus infection screening: will continue with urine or blood PCR as per our guidelines to prevent BK virus viremia and allograft dysfunction  Lab Results   Component Value Date    BKVIRUSDNAUR NEGATIVE 09/15/2014    BKQUANTURINE <250 09/15/2014    BKVIRUSLOG <2.40 09/15/2014    BKVIRUSURINE Urine 09/15/2014         10. Weight education: provided during the clinic visit.   Body mass index is 28.79 kg/m².       11.Patient safety education regarding immunosuppression including prophylaxis posttransplant for CMV, PCP : Education provided about  vaccination and prevention of infections.      12.  Cytopenias: no significant cytopenias will monitor as per our guidelines. Medicine list reviewed including potential causes of drug-induced cytopenias  Lab Results   Component Value Date    WBC 5.7 08/24/2020    HGB 11.1 (L) 08/24/2020    HCT 34.5 (L) 08/24/2020    MCV 91 08/24/2020     08/24/2020       Follow-up:   Annual follow-up with kidney transplant clinic with repeat labs, including renal function panel with UA, urine protein/creatinine ratio, and drug trough level q3 months.  Patient also reminded to follow-up with general nephrologist.      Education:   Material provided to the patient.  Patient reminded to call with any health changes since these can be early signs of significant complications.  Also, I advised the patient to be sure any new medications or changes of old medications are discussed with either a pharmacist or physician knowledgeable with transplant to avoid rejection/drug toxicity related to significant drug interactions.    Exercise: reminded Raymundo of the importance of regular exercise for weight management, blood sugar and blood pressure management.  I also explained exercise has been shown to improve cardiovascular health, energy level, and sleep hygiene.  Lastly, I advised him that cardiovascular complications are leading cause of death for renal transplant recipients, and regular exercise can help lower this risk.    I spoke with the patient for 30 minutes. More than half dedicated to counseling and education. All questions answered    Rhiannon Echols NP-C  Transplant Nephrology    [facility] Patient Status  Functional Status: 80% - Normal activity with effort: some symptoms of disease  Physical Capacity: No Limitations

## 2020-12-27 ENCOUNTER — HOSPITAL ENCOUNTER (INPATIENT)
Facility: HOSPITAL | Age: 65
LOS: 2 days | Discharge: HOME | DRG: 372 | End: 2020-12-30
Attending: EMERGENCY MEDICINE | Admitting: INTERNAL MEDICINE
Payer: MEDICARE

## 2020-12-27 DIAGNOSIS — A04.72 CLOSTRIDIUM DIFFICILE COLITIS: ICD-10-CM

## 2020-12-27 DIAGNOSIS — E86.0 DEHYDRATION: ICD-10-CM

## 2020-12-27 DIAGNOSIS — N18.30 STAGE 3 CHRONIC KIDNEY DISEASE, UNSPECIFIED WHETHER STAGE 3A OR 3B CKD: Chronic | ICD-10-CM

## 2020-12-27 DIAGNOSIS — A09 DIARRHEA OF INFECTIOUS ORIGIN: ICD-10-CM

## 2020-12-27 DIAGNOSIS — I10 ESSENTIAL HYPERTENSION: Chronic | ICD-10-CM

## 2020-12-27 DIAGNOSIS — Q61.3 POLYCYSTIC KIDNEY DISEASE: ICD-10-CM

## 2020-12-27 DIAGNOSIS — Z94.0 S/P CADAVER RENAL TRANSPLANT: ICD-10-CM

## 2020-12-27 DIAGNOSIS — K52.9 COLITIS: Primary | ICD-10-CM

## 2020-12-27 LAB
ALBUMIN SERPL BCP-MCNC: 3.7 G/DL (ref 3.5–5.2)
ALP SERPL-CCNC: 59 U/L (ref 55–135)
ALT SERPL W/O P-5'-P-CCNC: 23 U/L (ref 10–44)
ANION GAP SERPL CALC-SCNC: 10 MMOL/L (ref 8–16)
AST SERPL-CCNC: 20 U/L (ref 10–40)
BASOPHILS # BLD AUTO: 0.02 K/UL (ref 0–0.2)
BASOPHILS NFR BLD: 0.2 % (ref 0–1.9)
BILIRUB SERPL-MCNC: 0.7 MG/DL (ref 0.1–1)
BILIRUB UR QL STRIP: NEGATIVE
BUN SERPL-MCNC: 33 MG/DL (ref 8–23)
C DIFF GDH STL QL: POSITIVE
C DIFF TOX A+B STL QL IA: POSITIVE
CALCIUM SERPL-MCNC: 8.6 MG/DL (ref 8.7–10.5)
CHLORIDE SERPL-SCNC: 111 MMOL/L (ref 95–110)
CLARITY UR: CLEAR
CO2 SERPL-SCNC: 16 MMOL/L (ref 23–29)
COLOR UR: YELLOW
CREAT SERPL-MCNC: 1.7 MG/DL (ref 0.5–1.4)
DIFFERENTIAL METHOD: ABNORMAL
EOSINOPHIL # BLD AUTO: 0 K/UL (ref 0–0.5)
EOSINOPHIL NFR BLD: 0 % (ref 0–8)
ERYTHROCYTE [DISTWIDTH] IN BLOOD BY AUTOMATED COUNT: 13.3 % (ref 11.5–14.5)
EST. GFR  (AFRICAN AMERICAN): 48 ML/MIN/1.73 M^2
EST. GFR  (NON AFRICAN AMERICAN): 42 ML/MIN/1.73 M^2
GLUCOSE SERPL-MCNC: 138 MG/DL (ref 70–110)
GLUCOSE UR QL STRIP: NEGATIVE
HCT VFR BLD AUTO: 35.8 % (ref 40–54)
HGB BLD-MCNC: 11.4 G/DL (ref 14–18)
HGB UR QL STRIP: NEGATIVE
IMM GRANULOCYTES # BLD AUTO: 0.02 K/UL (ref 0–0.04)
IMM GRANULOCYTES NFR BLD AUTO: 0.2 % (ref 0–0.5)
KETONES UR QL STRIP: NEGATIVE
LEUKOCYTE ESTERASE UR QL STRIP: NEGATIVE
LIPASE SERPL-CCNC: 22 U/L (ref 4–60)
LYMPHOCYTES # BLD AUTO: 0.8 K/UL (ref 1–4.8)
LYMPHOCYTES NFR BLD: 7.6 % (ref 18–48)
MCH RBC QN AUTO: 28.8 PG (ref 27–31)
MCHC RBC AUTO-ENTMCNC: 31.8 G/DL (ref 32–36)
MCV RBC AUTO: 90 FL (ref 82–98)
MONOCYTES # BLD AUTO: 0.6 K/UL (ref 0.3–1)
MONOCYTES NFR BLD: 5.9 % (ref 4–15)
NEUTROPHILS # BLD AUTO: 9.1 K/UL (ref 1.8–7.7)
NEUTROPHILS NFR BLD: 86.1 % (ref 38–73)
NITRITE UR QL STRIP: NEGATIVE
NRBC BLD-RTO: 0 /100 WBC
OB PNL STL: POSITIVE
PH UR STRIP: 6 [PH] (ref 5–8)
PLATELET # BLD AUTO: 154 K/UL (ref 150–350)
PMV BLD AUTO: 10.2 FL (ref 9.2–12.9)
POTASSIUM SERPL-SCNC: 4.2 MMOL/L (ref 3.5–5.1)
PROT SERPL-MCNC: 6.5 G/DL (ref 6–8.4)
PROT UR QL STRIP: NEGATIVE
RBC # BLD AUTO: 3.96 M/UL (ref 4.6–6.2)
SARS-COV-2 RDRP RESP QL NAA+PROBE: NEGATIVE
SODIUM SERPL-SCNC: 137 MMOL/L (ref 136–145)
SP GR UR STRIP: 1.02 (ref 1–1.03)
URN SPEC COLLECT METH UR: NORMAL
UROBILINOGEN UR STRIP-ACNC: NEGATIVE EU/DL
WBC # BLD AUTO: 10.54 K/UL (ref 3.9–12.7)

## 2020-12-27 PROCEDURE — 63600175 PHARM REV CODE 636 W HCPCS: Performed by: PHYSICIAN ASSISTANT

## 2020-12-27 PROCEDURE — 87209 SMEAR COMPLEX STAIN: CPT

## 2020-12-27 PROCEDURE — 81003 URINALYSIS AUTO W/O SCOPE: CPT

## 2020-12-27 PROCEDURE — 87045 FECES CULTURE AEROBIC BACT: CPT

## 2020-12-27 PROCEDURE — 25000003 PHARM REV CODE 250: Performed by: PHYSICIAN ASSISTANT

## 2020-12-27 PROCEDURE — 85025 COMPLETE CBC W/AUTO DIFF WBC: CPT

## 2020-12-27 PROCEDURE — 87449 NOS EACH ORGANISM AG IA: CPT

## 2020-12-27 PROCEDURE — 96365 THER/PROPH/DIAG IV INF INIT: CPT

## 2020-12-27 PROCEDURE — 96376 TX/PRO/DX INJ SAME DRUG ADON: CPT

## 2020-12-27 PROCEDURE — S0030 INJECTION, METRONIDAZOLE: HCPCS | Performed by: NURSE PRACTITIONER

## 2020-12-27 PROCEDURE — 80053 COMPREHEN METABOLIC PANEL: CPT

## 2020-12-27 PROCEDURE — 99291 CRITICAL CARE FIRST HOUR: CPT | Mod: 25

## 2020-12-27 PROCEDURE — 96361 HYDRATE IV INFUSION ADD-ON: CPT

## 2020-12-27 PROCEDURE — 96372 THER/PROPH/DIAG INJ SC/IM: CPT | Mod: 59

## 2020-12-27 PROCEDURE — 96367 TX/PROPH/DG ADDL SEQ IV INF: CPT

## 2020-12-27 PROCEDURE — 96375 TX/PRO/DX INJ NEW DRUG ADDON: CPT

## 2020-12-27 PROCEDURE — 25000003 PHARM REV CODE 250: Performed by: NURSE PRACTITIONER

## 2020-12-27 PROCEDURE — 25000003 PHARM REV CODE 250: Performed by: EMERGENCY MEDICINE

## 2020-12-27 PROCEDURE — G0378 HOSPITAL OBSERVATION PER HR: HCPCS

## 2020-12-27 PROCEDURE — 87046 STOOL CULTR AEROBIC BACT EA: CPT

## 2020-12-27 PROCEDURE — 87324 CLOSTRIDIUM AG IA: CPT

## 2020-12-27 PROCEDURE — 87427 SHIGA-LIKE TOXIN AG IA: CPT

## 2020-12-27 PROCEDURE — 82272 OCCULT BLD FECES 1-3 TESTS: CPT

## 2020-12-27 PROCEDURE — 83690 ASSAY OF LIPASE: CPT

## 2020-12-27 PROCEDURE — U0002 COVID-19 LAB TEST NON-CDC: HCPCS

## 2020-12-27 PROCEDURE — 63600175 PHARM REV CODE 636 W HCPCS: Performed by: NURSE PRACTITIONER

## 2020-12-27 RX ORDER — CARVEDILOL 12.5 MG/1
12.5 TABLET ORAL 2 TIMES DAILY
Status: DISCONTINUED | OUTPATIENT
Start: 2020-12-27 | End: 2020-12-30 | Stop reason: HOSPADM

## 2020-12-27 RX ORDER — METRONIDAZOLE 500 MG/100ML
500 INJECTION, SOLUTION INTRAVENOUS
Status: DISCONTINUED | OUTPATIENT
Start: 2020-12-27 | End: 2020-12-28

## 2020-12-27 RX ORDER — TACROLIMUS 0.5 MG/1
0.5 CAPSULE ORAL EVERY EVENING
Status: DISCONTINUED | OUTPATIENT
Start: 2020-12-28 | End: 2020-12-28

## 2020-12-27 RX ORDER — SODIUM BICARBONATE 650 MG/1
650 TABLET ORAL 2 TIMES DAILY
Status: DISCONTINUED | OUTPATIENT
Start: 2020-12-27 | End: 2020-12-30 | Stop reason: HOSPADM

## 2020-12-27 RX ORDER — HYDROMORPHONE HYDROCHLORIDE 2 MG/ML
1 INJECTION, SOLUTION INTRAMUSCULAR; INTRAVENOUS; SUBCUTANEOUS
Status: COMPLETED | OUTPATIENT
Start: 2020-12-27 | End: 2020-12-27

## 2020-12-27 RX ORDER — CIPROFLOXACIN 2 MG/ML
400 INJECTION, SOLUTION INTRAVENOUS
Status: DISCONTINUED | OUTPATIENT
Start: 2020-12-27 | End: 2020-12-28

## 2020-12-27 RX ORDER — PANTOPRAZOLE SODIUM 40 MG/1
40 TABLET, DELAYED RELEASE ORAL DAILY
Status: DISCONTINUED | OUTPATIENT
Start: 2020-12-28 | End: 2020-12-27

## 2020-12-27 RX ORDER — PANTOPRAZOLE SODIUM 40 MG/10ML
40 INJECTION, POWDER, LYOPHILIZED, FOR SOLUTION INTRAVENOUS 2 TIMES DAILY
Status: DISCONTINUED | OUTPATIENT
Start: 2020-12-27 | End: 2020-12-30

## 2020-12-27 RX ORDER — ONDANSETRON 2 MG/ML
4 INJECTION INTRAMUSCULAR; INTRAVENOUS EVERY 6 HOURS PRN
Status: DISCONTINUED | OUTPATIENT
Start: 2020-12-27 | End: 2020-12-30 | Stop reason: HOSPADM

## 2020-12-27 RX ORDER — TACROLIMUS 1 MG/1
1 CAPSULE ORAL EVERY MORNING
Status: DISCONTINUED | OUTPATIENT
Start: 2020-12-28 | End: 2020-12-28

## 2020-12-27 RX ORDER — BENAZEPRIL HYDROCHLORIDE 5 MG/1
5 TABLET ORAL DAILY
Status: DISCONTINUED | OUTPATIENT
Start: 2020-12-28 | End: 2020-12-29

## 2020-12-27 RX ORDER — CLOPIDOGREL BISULFATE 75 MG/1
75 TABLET ORAL DAILY
Status: DISCONTINUED | OUTPATIENT
Start: 2020-12-28 | End: 2020-12-27

## 2020-12-27 RX ORDER — HYDRALAZINE HYDROCHLORIDE 20 MG/ML
10 INJECTION INTRAMUSCULAR; INTRAVENOUS EVERY 6 HOURS PRN
Status: DISCONTINUED | OUTPATIENT
Start: 2020-12-27 | End: 2020-12-30 | Stop reason: HOSPADM

## 2020-12-27 RX ORDER — SODIUM CHLORIDE 9 MG/ML
INJECTION, SOLUTION INTRAVENOUS CONTINUOUS
Status: DISCONTINUED | OUTPATIENT
Start: 2020-12-27 | End: 2020-12-29

## 2020-12-27 RX ORDER — MYCOPHENOLATE MOFETIL 250 MG/1
250 CAPSULE ORAL 2 TIMES DAILY
Status: DISCONTINUED | OUTPATIENT
Start: 2020-12-27 | End: 2020-12-30 | Stop reason: HOSPADM

## 2020-12-27 RX ADMIN — ONDANSETRON 4 MG: 2 INJECTION INTRAMUSCULAR; INTRAVENOUS at 09:12

## 2020-12-27 RX ADMIN — PROMETHAZINE HYDROCHLORIDE 12.5 MG: 25 INJECTION INTRAMUSCULAR; INTRAVENOUS at 09:12

## 2020-12-27 RX ADMIN — SODIUM CHLORIDE 1000 ML: 0.9 INJECTION, SOLUTION INTRAVENOUS at 07:12

## 2020-12-27 RX ADMIN — SODIUM BICARBONATE 650 MG: 650 TABLET ORAL at 09:12

## 2020-12-27 RX ADMIN — CIPROFLOXACIN 400 MG: 2 INJECTION, SOLUTION INTRAVENOUS at 09:12

## 2020-12-27 RX ADMIN — CARVEDILOL 12.5 MG: 12.5 TABLET, FILM COATED ORAL at 09:12

## 2020-12-27 RX ADMIN — HYDROMORPHONE HYDROCHLORIDE 1 MG: 2 INJECTION INTRAMUSCULAR; INTRAVENOUS; SUBCUTANEOUS at 03:12

## 2020-12-27 RX ADMIN — PROMETHAZINE HYDROCHLORIDE 12.5 MG: 25 INJECTION INTRAMUSCULAR; INTRAVENOUS at 03:12

## 2020-12-27 RX ADMIN — SODIUM CHLORIDE 125 ML/HR: 0.9 INJECTION, SOLUTION INTRAVENOUS at 09:12

## 2020-12-27 RX ADMIN — METRONIDAZOLE 500 MG: 500 INJECTION, SOLUTION INTRAVENOUS at 09:12

## 2020-12-27 RX ADMIN — SODIUM CHLORIDE 1000 ML: 0.9 INJECTION, SOLUTION INTRAVENOUS at 06:12

## 2020-12-27 RX ADMIN — MYCOPHENOLATE MOFETIL 250 MG: 250 CAPSULE ORAL at 09:12

## 2020-12-28 PROBLEM — D84.9 IMMUNOCOMPROMISED: Status: ACTIVE | Noted: 2020-12-28

## 2020-12-28 PROBLEM — A41.9 SEPSIS: Status: ACTIVE | Noted: 2020-12-28

## 2020-12-28 PROBLEM — A04.72 CLOSTRIDIUM DIFFICILE COLITIS: Status: ACTIVE | Noted: 2020-12-28

## 2020-12-28 PROBLEM — E83.42 HYPOMAGNESEMIA: Status: ACTIVE | Noted: 2020-12-28

## 2020-12-28 LAB
ABO + RH BLD: NORMAL
ANION GAP SERPL CALC-SCNC: 6 MMOL/L (ref 8–16)
ANISOCYTOSIS BLD QL SMEAR: SLIGHT
BASOPHILS # BLD AUTO: 0.01 K/UL (ref 0–0.2)
BASOPHILS # BLD AUTO: 0.01 K/UL (ref 0–0.2)
BASOPHILS NFR BLD: 0.1 % (ref 0–1.9)
BASOPHILS NFR BLD: 0.1 % (ref 0–1.9)
BLD GP AB SCN CELLS X3 SERPL QL: NORMAL
BUN SERPL-MCNC: 31 MG/DL (ref 8–23)
CALCIUM SERPL-MCNC: 7.5 MG/DL (ref 8.7–10.5)
CHLORIDE SERPL-SCNC: 113 MMOL/L (ref 95–110)
CO2 SERPL-SCNC: 17 MMOL/L (ref 23–29)
CREAT SERPL-MCNC: 1.8 MG/DL (ref 0.5–1.4)
DIFFERENTIAL METHOD: ABNORMAL
DIFFERENTIAL METHOD: ABNORMAL
EOSINOPHIL # BLD AUTO: 0 K/UL (ref 0–0.5)
EOSINOPHIL # BLD AUTO: 0 K/UL (ref 0–0.5)
EOSINOPHIL NFR BLD: 0.1 % (ref 0–8)
EOSINOPHIL NFR BLD: 0.1 % (ref 0–8)
ERYTHROCYTE [DISTWIDTH] IN BLOOD BY AUTOMATED COUNT: 13.6 % (ref 11.5–14.5)
ERYTHROCYTE [DISTWIDTH] IN BLOOD BY AUTOMATED COUNT: 13.8 % (ref 11.5–14.5)
EST. GFR  (AFRICAN AMERICAN): 45 ML/MIN/1.73 M^2
EST. GFR  (NON AFRICAN AMERICAN): 39 ML/MIN/1.73 M^2
GLUCOSE SERPL-MCNC: 113 MG/DL (ref 70–110)
HCT VFR BLD AUTO: 30 % (ref 40–54)
HCT VFR BLD AUTO: 30.7 % (ref 40–54)
HGB BLD-MCNC: 9.3 G/DL (ref 14–18)
HGB BLD-MCNC: 9.4 G/DL (ref 14–18)
IMM GRANULOCYTES # BLD AUTO: 0.03 K/UL (ref 0–0.04)
IMM GRANULOCYTES # BLD AUTO: 0.05 K/UL (ref 0–0.04)
IMM GRANULOCYTES NFR BLD AUTO: 0.3 % (ref 0–0.5)
IMM GRANULOCYTES NFR BLD AUTO: 0.6 % (ref 0–0.5)
LYMPHOCYTES # BLD AUTO: 1.2 K/UL (ref 1–4.8)
LYMPHOCYTES # BLD AUTO: 1.6 K/UL (ref 1–4.8)
LYMPHOCYTES NFR BLD: 13 % (ref 18–48)
LYMPHOCYTES NFR BLD: 16.3 % (ref 18–48)
MAGNESIUM SERPL-MCNC: 1.2 MG/DL (ref 1.6–2.6)
MCH RBC QN AUTO: 28 PG (ref 27–31)
MCH RBC QN AUTO: 28.7 PG (ref 27–31)
MCHC RBC AUTO-ENTMCNC: 30.3 G/DL (ref 32–36)
MCHC RBC AUTO-ENTMCNC: 31.3 G/DL (ref 32–36)
MCV RBC AUTO: 92 FL (ref 82–98)
MCV RBC AUTO: 93 FL (ref 82–98)
MONOCYTES # BLD AUTO: 0.6 K/UL (ref 0.3–1)
MONOCYTES # BLD AUTO: 0.7 K/UL (ref 0.3–1)
MONOCYTES NFR BLD: 6.1 % (ref 4–15)
MONOCYTES NFR BLD: 8.1 % (ref 4–15)
NEUTROPHILS # BLD AUTO: 7 K/UL (ref 1.8–7.7)
NEUTROPHILS # BLD AUTO: 7.5 K/UL (ref 1.8–7.7)
NEUTROPHILS NFR BLD: 77.1 % (ref 38–73)
NEUTROPHILS NFR BLD: 78.1 % (ref 38–73)
NRBC BLD-RTO: 0 /100 WBC
NRBC BLD-RTO: 0 /100 WBC
OVALOCYTES BLD QL SMEAR: ABNORMAL
PLATELET # BLD AUTO: 129 K/UL (ref 150–350)
PLATELET # BLD AUTO: 136 K/UL (ref 150–350)
PLATELET BLD QL SMEAR: ABNORMAL
PMV BLD AUTO: 10.3 FL (ref 9.2–12.9)
PMV BLD AUTO: 9.9 FL (ref 9.2–12.9)
POIKILOCYTOSIS BLD QL SMEAR: SLIGHT
POTASSIUM SERPL-SCNC: 4.3 MMOL/L (ref 3.5–5.1)
RBC # BLD AUTO: 3.28 M/UL (ref 4.6–6.2)
RBC # BLD AUTO: 3.32 M/UL (ref 4.6–6.2)
SODIUM SERPL-SCNC: 136 MMOL/L (ref 136–145)
TACROLIMUS BLD-MCNC: 3.9 NG/ML (ref 5–15)
WBC # BLD AUTO: 8.91 K/UL (ref 3.9–12.7)
WBC # BLD AUTO: 9.73 K/UL (ref 3.9–12.7)

## 2020-12-28 PROCEDURE — 25000003 PHARM REV CODE 250: Performed by: NURSE PRACTITIONER

## 2020-12-28 PROCEDURE — 63600175 PHARM REV CODE 636 W HCPCS: Performed by: NURSE PRACTITIONER

## 2020-12-28 PROCEDURE — 99205 OFFICE O/P NEW HI 60 MIN: CPT | Mod: ,,, | Performed by: INTERNAL MEDICINE

## 2020-12-28 PROCEDURE — 96376 TX/PRO/DX INJ SAME DRUG ADON: CPT

## 2020-12-28 PROCEDURE — 85025 COMPLETE CBC W/AUTO DIFF WBC: CPT

## 2020-12-28 PROCEDURE — S0030 INJECTION, METRONIDAZOLE: HCPCS | Performed by: NURSE PRACTITIONER

## 2020-12-28 PROCEDURE — 96361 HYDRATE IV INFUSION ADD-ON: CPT

## 2020-12-28 PROCEDURE — C9113 INJ PANTOPRAZOLE SODIUM, VIA: HCPCS | Performed by: NURSE PRACTITIONER

## 2020-12-28 PROCEDURE — 63600175 PHARM REV CODE 636 W HCPCS: Performed by: INTERNAL MEDICINE

## 2020-12-28 PROCEDURE — 94761 N-INVAS EAR/PLS OXIMETRY MLT: CPT

## 2020-12-28 PROCEDURE — 99205 PR OFFICE/OUTPT VISIT, NEW, LEVL V, 60-74 MIN: ICD-10-PCS | Mod: ,,, | Performed by: INTERNAL MEDICINE

## 2020-12-28 PROCEDURE — 11000001 HC ACUTE MED/SURG PRIVATE ROOM

## 2020-12-28 PROCEDURE — 21400001 HC TELEMETRY ROOM

## 2020-12-28 PROCEDURE — 36415 COLL VENOUS BLD VENIPUNCTURE: CPT

## 2020-12-28 PROCEDURE — 80197 ASSAY OF TACROLIMUS: CPT

## 2020-12-28 PROCEDURE — 83735 ASSAY OF MAGNESIUM: CPT

## 2020-12-28 PROCEDURE — 96375 TX/PRO/DX INJ NEW DRUG ADDON: CPT

## 2020-12-28 PROCEDURE — 80048 BASIC METABOLIC PNL TOTAL CA: CPT

## 2020-12-28 PROCEDURE — 86900 BLOOD TYPING SEROLOGIC ABO: CPT

## 2020-12-28 RX ORDER — MAGNESIUM SULFATE HEPTAHYDRATE 40 MG/ML
2 INJECTION, SOLUTION INTRAVENOUS ONCE
Status: DISCONTINUED | OUTPATIENT
Start: 2020-12-28 | End: 2020-12-28

## 2020-12-28 RX ORDER — VANCOMYCIN HCL 50 MG/ML
125 SOLUTION, RECONSTITUTED, ORAL ORAL 4 TIMES DAILY
Status: DISCONTINUED | OUTPATIENT
Start: 2020-12-28 | End: 2020-12-30 | Stop reason: HOSPADM

## 2020-12-28 RX ORDER — MAGNESIUM SULFATE 1 G/100ML
1 INJECTION INTRAVENOUS ONCE
Status: DISCONTINUED | OUTPATIENT
Start: 2020-12-28 | End: 2020-12-28

## 2020-12-28 RX ORDER — ACETAMINOPHEN 325 MG/1
650 TABLET ORAL EVERY 6 HOURS PRN
Status: DISCONTINUED | OUTPATIENT
Start: 2020-12-28 | End: 2020-12-30 | Stop reason: HOSPADM

## 2020-12-28 RX ORDER — TACROLIMUS 1 MG/1
2 CAPSULE ORAL 2 TIMES DAILY
Status: DISCONTINUED | OUTPATIENT
Start: 2020-12-28 | End: 2020-12-29

## 2020-12-28 RX ORDER — MAGNESIUM SULFATE 1 G/100ML
1 INJECTION INTRAVENOUS ONCE
Status: COMPLETED | OUTPATIENT
Start: 2020-12-28 | End: 2020-12-28

## 2020-12-28 RX ORDER — MAGNESIUM SULFATE HEPTAHYDRATE 40 MG/ML
2 INJECTION, SOLUTION INTRAVENOUS ONCE
Status: COMPLETED | OUTPATIENT
Start: 2020-12-28 | End: 2020-12-28

## 2020-12-28 RX ADMIN — MYCOPHENOLATE MOFETIL 250 MG: 250 CAPSULE ORAL at 09:12

## 2020-12-28 RX ADMIN — SODIUM BICARBONATE 650 MG: 650 TABLET ORAL at 09:12

## 2020-12-28 RX ADMIN — VANCOMYCIN HYDROCHLORIDE 125 MG: 250 POWDER, FOR SOLUTION ORAL at 01:12

## 2020-12-28 RX ADMIN — SODIUM BICARBONATE 650 MG: 650 TABLET ORAL at 08:12

## 2020-12-28 RX ADMIN — METRONIDAZOLE 500 MG: 500 INJECTION, SOLUTION INTRAVENOUS at 05:12

## 2020-12-28 RX ADMIN — VANCOMYCIN HYDROCHLORIDE 125 MG: 250 POWDER, FOR SOLUTION ORAL at 05:12

## 2020-12-28 RX ADMIN — SODIUM CHLORIDE 125 ML/HR: 0.9 INJECTION, SOLUTION INTRAVENOUS at 05:12

## 2020-12-28 RX ADMIN — MYCOPHENOLATE MOFETIL 250 MG: 250 CAPSULE ORAL at 08:12

## 2020-12-28 RX ADMIN — PANTOPRAZOLE SODIUM 40 MG: 40 INJECTION, POWDER, FOR SOLUTION INTRAVENOUS at 09:12

## 2020-12-28 RX ADMIN — CIPROFLOXACIN 400 MG: 2 INJECTION, SOLUTION INTRAVENOUS at 09:12

## 2020-12-28 RX ADMIN — MAGNESIUM SULFATE 1 G: 1 INJECTION INTRAVENOUS at 11:12

## 2020-12-28 RX ADMIN — ACETAMINOPHEN 650 MG: 325 TABLET ORAL at 12:12

## 2020-12-28 RX ADMIN — BENAZEPRIL HYDROCHLORIDE 5 MG: 5 TABLET ORAL at 08:12

## 2020-12-28 RX ADMIN — TACROLIMUS 2 MG: 1 CAPSULE ORAL at 05:12

## 2020-12-28 RX ADMIN — CARVEDILOL 12.5 MG: 12.5 TABLET, FILM COATED ORAL at 09:12

## 2020-12-28 RX ADMIN — TACROLIMUS 1 MG: 1 CAPSULE ORAL at 08:12

## 2020-12-28 RX ADMIN — ACETAMINOPHEN 650 MG: 325 TABLET ORAL at 09:12

## 2020-12-28 RX ADMIN — CARVEDILOL 12.5 MG: 12.5 TABLET, FILM COATED ORAL at 08:12

## 2020-12-28 RX ADMIN — VANCOMYCIN HYDROCHLORIDE 125 MG: 250 POWDER, FOR SOLUTION ORAL at 09:12

## 2020-12-28 RX ADMIN — MAGNESIUM SULFATE 2 G: 2 INJECTION INTRAVENOUS at 01:12

## 2020-12-29 LAB
ALBUMIN SERPL BCP-MCNC: 2.7 G/DL (ref 3.5–5.2)
ALP SERPL-CCNC: 41 U/L (ref 55–135)
ALT SERPL W/O P-5'-P-CCNC: 13 U/L (ref 10–44)
ANION GAP SERPL CALC-SCNC: 9 MMOL/L (ref 8–16)
AST SERPL-CCNC: 16 U/L (ref 10–40)
BASOPHILS # BLD AUTO: 0 K/UL (ref 0–0.2)
BASOPHILS NFR BLD: 0 % (ref 0–1.9)
BILIRUB DIRECT SERPL-MCNC: 0.2 MG/DL (ref 0.1–0.3)
BILIRUB SERPL-MCNC: 0.5 MG/DL (ref 0.1–1)
BUN SERPL-MCNC: 24 MG/DL (ref 8–23)
CALCIUM SERPL-MCNC: 7.8 MG/DL (ref 8.7–10.5)
CHLORIDE SERPL-SCNC: 117 MMOL/L (ref 95–110)
CO2 SERPL-SCNC: 14 MMOL/L (ref 23–29)
CREAT SERPL-MCNC: 1.7 MG/DL (ref 0.5–1.4)
DIFFERENTIAL METHOD: ABNORMAL
E COLI SXT1 STL QL IA: NEGATIVE
E COLI SXT2 STL QL IA: NEGATIVE
EOSINOPHIL # BLD AUTO: 0.1 K/UL (ref 0–0.5)
EOSINOPHIL NFR BLD: 1.6 % (ref 0–8)
ERYTHROCYTE [DISTWIDTH] IN BLOOD BY AUTOMATED COUNT: 14 % (ref 11.5–14.5)
EST. GFR  (AFRICAN AMERICAN): 48 ML/MIN/1.73 M^2
EST. GFR  (NON AFRICAN AMERICAN): 42 ML/MIN/1.73 M^2
GLUCOSE SERPL-MCNC: 96 MG/DL (ref 70–110)
HCT VFR BLD AUTO: 29.3 % (ref 40–54)
HGB BLD-MCNC: 8.7 G/DL (ref 14–18)
IMM GRANULOCYTES # BLD AUTO: 0.02 K/UL (ref 0–0.04)
IMM GRANULOCYTES NFR BLD AUTO: 0.4 % (ref 0–0.5)
LYMPHOCYTES # BLD AUTO: 1.3 K/UL (ref 1–4.8)
LYMPHOCYTES NFR BLD: 24.3 % (ref 18–48)
MAGNESIUM SERPL-MCNC: 2.2 MG/DL (ref 1.6–2.6)
MCH RBC QN AUTO: 28.2 PG (ref 27–31)
MCHC RBC AUTO-ENTMCNC: 29.7 G/DL (ref 32–36)
MCV RBC AUTO: 95 FL (ref 82–98)
MONOCYTES # BLD AUTO: 0.3 K/UL (ref 0.3–1)
MONOCYTES NFR BLD: 6.2 % (ref 4–15)
NEUTROPHILS # BLD AUTO: 3.5 K/UL (ref 1.8–7.7)
NEUTROPHILS NFR BLD: 67.5 % (ref 38–73)
NRBC BLD-RTO: 0 /100 WBC
O+P STL MICRO: NORMAL
PHOSPHATE SERPL-MCNC: 2.6 MG/DL (ref 2.7–4.5)
PLATELET # BLD AUTO: 115 K/UL (ref 150–350)
PMV BLD AUTO: 10.5 FL (ref 9.2–12.9)
POTASSIUM SERPL-SCNC: 4.4 MMOL/L (ref 3.5–5.1)
PROT SERPL-MCNC: 5.1 G/DL (ref 6–8.4)
RBC # BLD AUTO: 3.08 M/UL (ref 4.6–6.2)
SODIUM SERPL-SCNC: 140 MMOL/L (ref 136–145)
TACROLIMUS BLD-MCNC: 10.6 NG/ML (ref 5–15)
WBC # BLD AUTO: 5.14 K/UL (ref 3.9–12.7)

## 2020-12-29 PROCEDURE — 99233 PR SUBSEQUENT HOSPITAL CARE,LEVL III: ICD-10-PCS | Mod: ,,, | Performed by: INTERNAL MEDICINE

## 2020-12-29 PROCEDURE — 85025 COMPLETE CBC W/AUTO DIFF WBC: CPT

## 2020-12-29 PROCEDURE — 83735 ASSAY OF MAGNESIUM: CPT

## 2020-12-29 PROCEDURE — 99223 1ST HOSP IP/OBS HIGH 75: CPT | Mod: ,,, | Performed by: INTERNAL MEDICINE

## 2020-12-29 PROCEDURE — C9113 INJ PANTOPRAZOLE SODIUM, VIA: HCPCS | Performed by: NURSE PRACTITIONER

## 2020-12-29 PROCEDURE — 80048 BASIC METABOLIC PNL TOTAL CA: CPT

## 2020-12-29 PROCEDURE — 80197 ASSAY OF TACROLIMUS: CPT

## 2020-12-29 PROCEDURE — 99233 SBSQ HOSP IP/OBS HIGH 50: CPT | Mod: ,,, | Performed by: INTERNAL MEDICINE

## 2020-12-29 PROCEDURE — 36415 COLL VENOUS BLD VENIPUNCTURE: CPT

## 2020-12-29 PROCEDURE — 25000003 PHARM REV CODE 250: Performed by: NURSE PRACTITIONER

## 2020-12-29 PROCEDURE — 99223 PR INITIAL HOSPITAL CARE,LEVL III: ICD-10-PCS | Mod: ,,, | Performed by: INTERNAL MEDICINE

## 2020-12-29 PROCEDURE — 25000003 PHARM REV CODE 250: Performed by: INTERNAL MEDICINE

## 2020-12-29 PROCEDURE — 80076 HEPATIC FUNCTION PANEL: CPT

## 2020-12-29 PROCEDURE — 63600175 PHARM REV CODE 636 W HCPCS: Performed by: INTERNAL MEDICINE

## 2020-12-29 PROCEDURE — 63600175 PHARM REV CODE 636 W HCPCS: Performed by: NURSE PRACTITIONER

## 2020-12-29 PROCEDURE — 84100 ASSAY OF PHOSPHORUS: CPT

## 2020-12-29 PROCEDURE — 21400001 HC TELEMETRY ROOM

## 2020-12-29 PROCEDURE — 94761 N-INVAS EAR/PLS OXIMETRY MLT: CPT

## 2020-12-29 RX ADMIN — TACROLIMUS 1.5 MG: 1 CAPSULE ORAL at 05:12

## 2020-12-29 RX ADMIN — SODIUM BICARBONATE 650 MG: 650 TABLET ORAL at 08:12

## 2020-12-29 RX ADMIN — CARVEDILOL 12.5 MG: 12.5 TABLET, FILM COATED ORAL at 08:12

## 2020-12-29 RX ADMIN — VANCOMYCIN HYDROCHLORIDE 125 MG: 250 POWDER, FOR SOLUTION ORAL at 05:12

## 2020-12-29 RX ADMIN — ACETAMINOPHEN 650 MG: 325 TABLET ORAL at 01:12

## 2020-12-29 RX ADMIN — ACETAMINOPHEN 650 MG: 325 TABLET ORAL at 11:12

## 2020-12-29 RX ADMIN — SODIUM BICARBONATE: 84 INJECTION, SOLUTION INTRAVENOUS at 01:12

## 2020-12-29 RX ADMIN — BENAZEPRIL HYDROCHLORIDE 5 MG: 5 TABLET ORAL at 08:12

## 2020-12-29 RX ADMIN — SODIUM BICARBONATE: 84 INJECTION, SOLUTION INTRAVENOUS at 11:12

## 2020-12-29 RX ADMIN — VANCOMYCIN HYDROCHLORIDE 125 MG: 250 POWDER, FOR SOLUTION ORAL at 09:12

## 2020-12-29 RX ADMIN — VANCOMYCIN HYDROCHLORIDE 125 MG: 250 POWDER, FOR SOLUTION ORAL at 01:12

## 2020-12-29 RX ADMIN — PANTOPRAZOLE SODIUM 40 MG: 40 INJECTION, POWDER, FOR SOLUTION INTRAVENOUS at 08:12

## 2020-12-29 RX ADMIN — VANCOMYCIN HYDROCHLORIDE 125 MG: 250 POWDER, FOR SOLUTION ORAL at 08:12

## 2020-12-29 RX ADMIN — MYCOPHENOLATE MOFETIL 250 MG: 250 CAPSULE ORAL at 08:12

## 2020-12-29 RX ADMIN — TACROLIMUS 2 MG: 1 CAPSULE ORAL at 08:12

## 2020-12-30 VITALS
HEIGHT: 66 IN | HEART RATE: 62 BPM | WEIGHT: 179.44 LBS | RESPIRATION RATE: 18 BRPM | TEMPERATURE: 98 F | DIASTOLIC BLOOD PRESSURE: 66 MMHG | BODY MASS INDEX: 28.84 KG/M2 | OXYGEN SATURATION: 93 % | SYSTOLIC BLOOD PRESSURE: 141 MMHG

## 2020-12-30 PROBLEM — A41.9 SEPSIS: Status: RESOLVED | Noted: 2020-12-28 | Resolved: 2020-12-30

## 2020-12-30 PROBLEM — E86.0 DEHYDRATION: Status: RESOLVED | Noted: 2020-12-27 | Resolved: 2020-12-30

## 2020-12-30 LAB
ANION GAP SERPL CALC-SCNC: 8 MMOL/L (ref 8–16)
BASOPHILS # BLD AUTO: 0.01 K/UL (ref 0–0.2)
BASOPHILS NFR BLD: 0.3 % (ref 0–1.9)
BUN SERPL-MCNC: 17 MG/DL (ref 8–23)
CALCIUM SERPL-MCNC: 7.9 MG/DL (ref 8.7–10.5)
CHLORIDE SERPL-SCNC: 116 MMOL/L (ref 95–110)
CMV DNA SERPL NAA+PROBE-ACNC: NORMAL IU/ML
CO2 SERPL-SCNC: 18 MMOL/L (ref 23–29)
CREAT SERPL-MCNC: 1.3 MG/DL (ref 0.5–1.4)
DIFFERENTIAL METHOD: ABNORMAL
EOSINOPHIL # BLD AUTO: 0.1 K/UL (ref 0–0.5)
EOSINOPHIL NFR BLD: 3.7 % (ref 0–8)
ERYTHROCYTE [DISTWIDTH] IN BLOOD BY AUTOMATED COUNT: 13.9 % (ref 11.5–14.5)
EST. GFR  (AFRICAN AMERICAN): >60 ML/MIN/1.73 M^2
EST. GFR  (NON AFRICAN AMERICAN): 58 ML/MIN/1.73 M^2
GLUCOSE SERPL-MCNC: 89 MG/DL (ref 70–110)
HCT VFR BLD AUTO: 27.4 % (ref 40–54)
HGB BLD-MCNC: 8.5 G/DL (ref 14–18)
IMM GRANULOCYTES # BLD AUTO: 0.04 K/UL (ref 0–0.04)
IMM GRANULOCYTES NFR BLD AUTO: 1.1 % (ref 0–0.5)
LYMPHOCYTES # BLD AUTO: 1.3 K/UL (ref 1–4.8)
LYMPHOCYTES NFR BLD: 35.3 % (ref 18–48)
MAGNESIUM SERPL-MCNC: 1.8 MG/DL (ref 1.6–2.6)
MCH RBC QN AUTO: 29 PG (ref 27–31)
MCHC RBC AUTO-ENTMCNC: 31 G/DL (ref 32–36)
MCV RBC AUTO: 94 FL (ref 82–98)
MONOCYTES # BLD AUTO: 0.3 K/UL (ref 0.3–1)
MONOCYTES NFR BLD: 7.7 % (ref 4–15)
NEUTROPHILS # BLD AUTO: 2 K/UL (ref 1.8–7.7)
NEUTROPHILS NFR BLD: 51.9 % (ref 38–73)
NRBC BLD-RTO: 0 /100 WBC
PLATELET # BLD AUTO: 124 K/UL (ref 150–350)
PMV BLD AUTO: 10.1 FL (ref 9.2–12.9)
POTASSIUM SERPL-SCNC: 3.8 MMOL/L (ref 3.5–5.1)
RBC # BLD AUTO: 2.93 M/UL (ref 4.6–6.2)
SODIUM SERPL-SCNC: 142 MMOL/L (ref 136–145)
TACROLIMUS BLD-MCNC: 14 NG/ML (ref 5–15)
WBC # BLD AUTO: 3.77 K/UL (ref 3.9–12.7)

## 2020-12-30 PROCEDURE — 63600175 PHARM REV CODE 636 W HCPCS: Performed by: NURSE PRACTITIONER

## 2020-12-30 PROCEDURE — 80048 BASIC METABOLIC PNL TOTAL CA: CPT

## 2020-12-30 PROCEDURE — C9113 INJ PANTOPRAZOLE SODIUM, VIA: HCPCS | Performed by: NURSE PRACTITIONER

## 2020-12-30 PROCEDURE — 25000003 PHARM REV CODE 250: Performed by: NURSE PRACTITIONER

## 2020-12-30 PROCEDURE — 63600175 PHARM REV CODE 636 W HCPCS: Performed by: INTERNAL MEDICINE

## 2020-12-30 PROCEDURE — 85025 COMPLETE CBC W/AUTO DIFF WBC: CPT

## 2020-12-30 PROCEDURE — 25000003 PHARM REV CODE 250: Performed by: INTERNAL MEDICINE

## 2020-12-30 PROCEDURE — 80197 ASSAY OF TACROLIMUS: CPT

## 2020-12-30 PROCEDURE — 99233 PR SUBSEQUENT HOSPITAL CARE,LEVL III: ICD-10-PCS | Mod: ,,, | Performed by: INTERNAL MEDICINE

## 2020-12-30 PROCEDURE — 94761 N-INVAS EAR/PLS OXIMETRY MLT: CPT

## 2020-12-30 PROCEDURE — 83735 ASSAY OF MAGNESIUM: CPT

## 2020-12-30 PROCEDURE — 99233 SBSQ HOSP IP/OBS HIGH 50: CPT | Mod: ,,, | Performed by: INTERNAL MEDICINE

## 2020-12-30 PROCEDURE — 36415 COLL VENOUS BLD VENIPUNCTURE: CPT

## 2020-12-30 RX ORDER — ALLOPURINOL 100 MG/1
300 TABLET ORAL DAILY
Start: 2021-01-08 | End: 2022-04-18

## 2020-12-30 RX ORDER — PANTOPRAZOLE SODIUM 40 MG/1
40 TABLET, DELAYED RELEASE ORAL 2 TIMES DAILY
Qty: 60 TABLET | Refills: 0 | Status: SHIPPED | OUTPATIENT
Start: 2020-12-30 | End: 2022-01-18

## 2020-12-30 RX ORDER — PANTOPRAZOLE SODIUM 40 MG/1
40 TABLET, DELAYED RELEASE ORAL 2 TIMES DAILY
Status: DISCONTINUED | OUTPATIENT
Start: 2020-12-30 | End: 2020-12-30 | Stop reason: HOSPADM

## 2020-12-30 RX ORDER — ACETAMINOPHEN 500 MG
500 TABLET ORAL EVERY 6 HOURS PRN
Start: 2020-12-30

## 2020-12-30 RX ORDER — TACROLIMUS 1 MG/1
1 CAPSULE ORAL 2 TIMES DAILY
Status: DISCONTINUED | OUTPATIENT
Start: 2020-12-30 | End: 2020-12-30 | Stop reason: HOSPADM

## 2020-12-30 RX ORDER — CLOPIDOGREL BISULFATE 75 MG/1
75 TABLET ORAL DAILY
Start: 2020-12-31

## 2020-12-30 RX ORDER — VANCOMYCIN HCL 50 MG/ML
125 SOLUTION, RECONSTITUTED, ORAL ORAL 4 TIMES DAILY
Qty: 90 ML | Refills: 0 | Status: SHIPPED | OUTPATIENT
Start: 2020-12-30 | End: 2021-01-08

## 2020-12-30 RX ADMIN — SODIUM BICARBONATE: 84 INJECTION, SOLUTION INTRAVENOUS at 10:12

## 2020-12-30 RX ADMIN — CARVEDILOL 12.5 MG: 12.5 TABLET, FILM COATED ORAL at 08:12

## 2020-12-30 RX ADMIN — MYCOPHENOLATE MOFETIL 250 MG: 250 CAPSULE ORAL at 08:12

## 2020-12-30 RX ADMIN — VANCOMYCIN HYDROCHLORIDE 125 MG: 250 POWDER, FOR SOLUTION ORAL at 08:12

## 2020-12-30 RX ADMIN — SODIUM BICARBONATE 650 MG: 650 TABLET ORAL at 08:12

## 2020-12-30 RX ADMIN — TACROLIMUS 1.5 MG: 1 CAPSULE ORAL at 08:12

## 2020-12-30 RX ADMIN — PANTOPRAZOLE SODIUM 40 MG: 40 INJECTION, POWDER, FOR SOLUTION INTRAVENOUS at 09:12

## 2020-12-30 RX ADMIN — VANCOMYCIN HYDROCHLORIDE 125 MG: 250 POWDER, FOR SOLUTION ORAL at 01:12

## 2020-12-31 LAB — BACTERIA STL CULT: NORMAL

## 2021-01-08 ENCOUNTER — PATIENT MESSAGE (OUTPATIENT)
Dept: TRANSPLANT | Facility: CLINIC | Age: 66
End: 2021-01-08

## 2021-01-21 ENCOUNTER — TELEPHONE (OUTPATIENT)
Dept: GASTROENTEROLOGY | Facility: CLINIC | Age: 66
End: 2021-01-21

## 2021-01-22 ENCOUNTER — LAB VISIT (OUTPATIENT)
Dept: LAB | Facility: HOSPITAL | Age: 66
End: 2021-01-22
Attending: INTERNAL MEDICINE
Payer: MEDICARE

## 2021-01-22 ENCOUNTER — OFFICE VISIT (OUTPATIENT)
Dept: GASTROENTEROLOGY | Facility: CLINIC | Age: 66
End: 2021-01-22
Payer: MEDICARE

## 2021-01-22 VITALS
HEIGHT: 66 IN | SYSTOLIC BLOOD PRESSURE: 130 MMHG | BODY MASS INDEX: 27.74 KG/M2 | HEART RATE: 70 BPM | WEIGHT: 172.63 LBS | DIASTOLIC BLOOD PRESSURE: 64 MMHG

## 2021-01-22 DIAGNOSIS — K52.9 COLITIS: ICD-10-CM

## 2021-01-22 DIAGNOSIS — A04.72 C. DIFFICILE COLITIS: Primary | ICD-10-CM

## 2021-01-22 DIAGNOSIS — A04.72 C. DIFFICILE COLITIS: ICD-10-CM

## 2021-01-22 DIAGNOSIS — A04.72 CLOSTRIDIUM DIFFICILE COLITIS: ICD-10-CM

## 2021-01-22 LAB
BASOPHILS # BLD AUTO: 0.01 K/UL (ref 0–0.2)
BASOPHILS NFR BLD: 0.2 % (ref 0–1.9)
DIFFERENTIAL METHOD: ABNORMAL
EOSINOPHIL # BLD AUTO: 0.2 K/UL (ref 0–0.5)
EOSINOPHIL NFR BLD: 2.5 % (ref 0–8)
ERYTHROCYTE [DISTWIDTH] IN BLOOD BY AUTOMATED COUNT: 13.8 % (ref 11.5–14.5)
HCT VFR BLD AUTO: 38.3 % (ref 40–54)
HGB BLD-MCNC: 11.4 G/DL (ref 14–18)
IMM GRANULOCYTES # BLD AUTO: 0.03 K/UL (ref 0–0.04)
IMM GRANULOCYTES NFR BLD AUTO: 0.5 % (ref 0–0.5)
LYMPHOCYTES # BLD AUTO: 1.8 K/UL (ref 1–4.8)
LYMPHOCYTES NFR BLD: 28.6 % (ref 18–48)
MCH RBC QN AUTO: 28.5 PG (ref 27–31)
MCHC RBC AUTO-ENTMCNC: 29.8 G/DL (ref 32–36)
MCV RBC AUTO: 96 FL (ref 82–98)
MONOCYTES # BLD AUTO: 0.5 K/UL (ref 0.3–1)
MONOCYTES NFR BLD: 7.5 % (ref 4–15)
NEUTROPHILS # BLD AUTO: 3.9 K/UL (ref 1.8–7.7)
NEUTROPHILS NFR BLD: 60.7 % (ref 38–73)
NRBC BLD-RTO: 0 /100 WBC
PLATELET # BLD AUTO: 202 K/UL (ref 150–350)
PMV BLD AUTO: 10.5 FL (ref 9.2–12.9)
RBC # BLD AUTO: 4 M/UL (ref 4.6–6.2)
WBC # BLD AUTO: 6.43 K/UL (ref 3.9–12.7)

## 2021-01-22 PROCEDURE — 85025 COMPLETE CBC W/AUTO DIFF WBC: CPT

## 2021-01-22 PROCEDURE — 36415 COLL VENOUS BLD VENIPUNCTURE: CPT

## 2021-01-22 PROCEDURE — 99999 PR PBB SHADOW E&M-EST. PATIENT-LVL IV: ICD-10-PCS | Mod: PBBFAC,,, | Performed by: INTERNAL MEDICINE

## 2021-01-22 PROCEDURE — 99214 PR OFFICE/OUTPT VISIT, EST, LEVL IV, 30-39 MIN: ICD-10-PCS | Mod: S$PBB,,, | Performed by: INTERNAL MEDICINE

## 2021-01-22 PROCEDURE — 87449 NOS EACH ORGANISM AG IA: CPT

## 2021-01-22 PROCEDURE — 87324 CLOSTRIDIUM AG IA: CPT

## 2021-01-22 PROCEDURE — 99214 OFFICE O/P EST MOD 30 MIN: CPT | Mod: S$PBB,,, | Performed by: INTERNAL MEDICINE

## 2021-01-22 PROCEDURE — 99214 OFFICE O/P EST MOD 30 MIN: CPT | Mod: PBBFAC | Performed by: INTERNAL MEDICINE

## 2021-01-22 PROCEDURE — 99999 PR PBB SHADOW E&M-EST. PATIENT-LVL IV: CPT | Mod: PBBFAC,,, | Performed by: INTERNAL MEDICINE

## 2021-01-22 RX ORDER — AMLODIPINE BESYLATE 2.5 MG/1
1 TABLET ORAL DAILY
COMMUNITY
Start: 2020-11-03 | End: 2021-07-22 | Stop reason: CLARIF

## 2021-01-23 LAB
C DIFF GDH STL QL: POSITIVE
C DIFF TOX A+B STL QL IA: POSITIVE

## 2021-01-25 ENCOUNTER — TELEPHONE (OUTPATIENT)
Dept: GASTROENTEROLOGY | Facility: CLINIC | Age: 66
End: 2021-01-25

## 2021-01-25 RX ORDER — VANCOMYCIN HYDROCHLORIDE 125 MG/1
125 CAPSULE ORAL 4 TIMES DAILY
Qty: 40 CAPSULE | Refills: 0 | Status: SHIPPED | OUTPATIENT
Start: 2021-01-25 | End: 2021-02-04

## 2021-02-26 ENCOUNTER — OFFICE VISIT (OUTPATIENT)
Dept: GASTROENTEROLOGY | Facility: CLINIC | Age: 66
End: 2021-02-26
Payer: MEDICARE

## 2021-02-26 ENCOUNTER — LAB VISIT (OUTPATIENT)
Dept: LAB | Facility: HOSPITAL | Age: 66
End: 2021-02-26
Attending: INTERNAL MEDICINE
Payer: MEDICARE

## 2021-02-26 VITALS
DIASTOLIC BLOOD PRESSURE: 70 MMHG | HEART RATE: 78 BPM | OXYGEN SATURATION: 98 % | BODY MASS INDEX: 28.16 KG/M2 | SYSTOLIC BLOOD PRESSURE: 122 MMHG | HEIGHT: 66 IN | WEIGHT: 175.25 LBS

## 2021-02-26 DIAGNOSIS — A04.72 C. DIFFICILE COLITIS: Primary | ICD-10-CM

## 2021-02-26 DIAGNOSIS — A04.72 C. DIFFICILE COLITIS: ICD-10-CM

## 2021-02-26 DIAGNOSIS — Z94.0 KIDNEY REPLACED BY TRANSPLANT: ICD-10-CM

## 2021-02-26 LAB
ALBUMIN SERPL BCP-MCNC: 3.6 G/DL (ref 3.5–5.2)
ALP SERPL-CCNC: 62 U/L (ref 55–135)
ALT SERPL W/O P-5'-P-CCNC: 23 U/L (ref 10–44)
ANION GAP SERPL CALC-SCNC: 10 MMOL/L (ref 8–16)
AST SERPL-CCNC: 22 U/L (ref 10–40)
BASOPHILS # BLD AUTO: 0.02 K/UL (ref 0–0.2)
BASOPHILS NFR BLD: 0.2 % (ref 0–1.9)
BILIRUB SERPL-MCNC: 0.5 MG/DL (ref 0.1–1)
BUN SERPL-MCNC: 21 MG/DL (ref 8–23)
CALCIUM SERPL-MCNC: 9.5 MG/DL (ref 8.7–10.5)
CHLORIDE SERPL-SCNC: 112 MMOL/L (ref 95–110)
CO2 SERPL-SCNC: 18 MMOL/L (ref 23–29)
CREAT SERPL-MCNC: 1.5 MG/DL (ref 0.5–1.4)
DIFFERENTIAL METHOD: ABNORMAL
EOSINOPHIL # BLD AUTO: 0.2 K/UL (ref 0–0.5)
EOSINOPHIL NFR BLD: 2.1 % (ref 0–8)
ERYTHROCYTE [DISTWIDTH] IN BLOOD BY AUTOMATED COUNT: 14.3 % (ref 11.5–14.5)
EST. GFR  (AFRICAN AMERICAN): 56 ML/MIN/1.73 M^2
EST. GFR  (NON AFRICAN AMERICAN): 48 ML/MIN/1.73 M^2
GLUCOSE SERPL-MCNC: 123 MG/DL (ref 70–110)
HCT VFR BLD AUTO: 34.4 % (ref 40–54)
HGB BLD-MCNC: 10.8 G/DL (ref 14–18)
IMM GRANULOCYTES # BLD AUTO: 0.03 K/UL (ref 0–0.04)
IMM GRANULOCYTES NFR BLD AUTO: 0.3 % (ref 0–0.5)
LYMPHOCYTES # BLD AUTO: 1.9 K/UL (ref 1–4.8)
LYMPHOCYTES NFR BLD: 22 % (ref 18–48)
MAGNESIUM SERPL-MCNC: 1.8 MG/DL (ref 1.6–2.6)
MCH RBC QN AUTO: 28.7 PG (ref 27–31)
MCHC RBC AUTO-ENTMCNC: 31.4 G/DL (ref 32–36)
MCV RBC AUTO: 92 FL (ref 82–98)
MONOCYTES # BLD AUTO: 0.4 K/UL (ref 0.3–1)
MONOCYTES NFR BLD: 4.2 % (ref 4–15)
NEUTROPHILS # BLD AUTO: 6.2 K/UL (ref 1.8–7.7)
NEUTROPHILS NFR BLD: 71.2 % (ref 38–73)
NRBC BLD-RTO: 0 /100 WBC
PLATELET # BLD AUTO: 197 K/UL (ref 150–350)
PMV BLD AUTO: 8.9 FL (ref 9.2–12.9)
POTASSIUM SERPL-SCNC: 4.4 MMOL/L (ref 3.5–5.1)
PROT SERPL-MCNC: 6.8 G/DL (ref 6–8.4)
RBC # BLD AUTO: 3.76 M/UL (ref 4.6–6.2)
SODIUM SERPL-SCNC: 140 MMOL/L (ref 136–145)
WBC # BLD AUTO: 8.65 K/UL (ref 3.9–12.7)

## 2021-02-26 PROCEDURE — 99999 PR PBB SHADOW E&M-EST. PATIENT-LVL IV: ICD-10-PCS | Mod: PBBFAC,,, | Performed by: INTERNAL MEDICINE

## 2021-02-26 PROCEDURE — 99214 OFFICE O/P EST MOD 30 MIN: CPT | Mod: S$PBB,,, | Performed by: INTERNAL MEDICINE

## 2021-02-26 PROCEDURE — 85025 COMPLETE CBC W/AUTO DIFF WBC: CPT

## 2021-02-26 PROCEDURE — 36415 COLL VENOUS BLD VENIPUNCTURE: CPT

## 2021-02-26 PROCEDURE — 80053 COMPREHEN METABOLIC PANEL: CPT

## 2021-02-26 PROCEDURE — 99214 OFFICE O/P EST MOD 30 MIN: CPT | Mod: PBBFAC | Performed by: INTERNAL MEDICINE

## 2021-02-26 PROCEDURE — 83735 ASSAY OF MAGNESIUM: CPT

## 2021-02-26 PROCEDURE — 99999 PR PBB SHADOW E&M-EST. PATIENT-LVL IV: CPT | Mod: PBBFAC,,, | Performed by: INTERNAL MEDICINE

## 2021-02-26 PROCEDURE — 99214 PR OFFICE/OUTPT VISIT, EST, LEVL IV, 30-39 MIN: ICD-10-PCS | Mod: S$PBB,,, | Performed by: INTERNAL MEDICINE

## 2021-03-01 ENCOUNTER — PATIENT MESSAGE (OUTPATIENT)
Dept: GASTROENTEROLOGY | Facility: CLINIC | Age: 66
End: 2021-03-01

## 2021-03-01 ENCOUNTER — TELEPHONE (OUTPATIENT)
Dept: GASTROENTEROLOGY | Facility: CLINIC | Age: 66
End: 2021-03-01

## 2021-03-01 DIAGNOSIS — A04.72 C. DIFFICILE COLITIS: Primary | ICD-10-CM

## 2021-03-01 RX ORDER — FIDAXOMICIN 200 MG/1
200 TABLET, FILM COATED ORAL 2 TIMES DAILY
Qty: 20 TABLET | Refills: 0 | Status: SHIPPED | OUTPATIENT
Start: 2021-03-01 | End: 2021-03-01 | Stop reason: SDUPTHER

## 2021-03-01 RX ORDER — FIDAXOMICIN 200 MG/1
200 TABLET, FILM COATED ORAL 2 TIMES DAILY
Qty: 20 TABLET | Refills: 0 | Status: SHIPPED | OUTPATIENT
Start: 2021-03-01 | End: 2021-03-09

## 2021-03-02 ENCOUNTER — PATIENT MESSAGE (OUTPATIENT)
Dept: GASTROENTEROLOGY | Facility: CLINIC | Age: 66
End: 2021-03-02

## 2021-03-05 ENCOUNTER — PATIENT MESSAGE (OUTPATIENT)
Dept: GASTROENTEROLOGY | Facility: CLINIC | Age: 66
End: 2021-03-05

## 2021-03-05 RX ORDER — VANCOMYCIN HYDROCHLORIDE 125 MG/1
125 CAPSULE ORAL 4 TIMES DAILY
Qty: 56 CAPSULE | Refills: 0 | Status: SHIPPED | OUTPATIENT
Start: 2021-03-05 | End: 2021-03-09

## 2021-03-05 RX ORDER — VANCOMYCIN HYDROCHLORIDE 125 MG/1
125 CAPSULE ORAL 4 TIMES DAILY
Qty: 56 CAPSULE | Refills: 0 | Status: SHIPPED | OUTPATIENT
Start: 2021-03-05 | End: 2021-03-05 | Stop reason: SDUPTHER

## 2021-03-09 ENCOUNTER — HOSPITAL ENCOUNTER (OUTPATIENT)
Dept: RADIOLOGY | Facility: HOSPITAL | Age: 66
Discharge: HOME | End: 2021-03-09
Attending: INTERNAL MEDICINE
Payer: MEDICARE

## 2021-03-09 ENCOUNTER — OFFICE VISIT (OUTPATIENT)
Dept: INFECTIOUS DISEASES | Facility: CLINIC | Age: 66
End: 2021-03-09
Payer: MEDICARE

## 2021-03-09 VITALS
BODY MASS INDEX: 27.95 KG/M2 | SYSTOLIC BLOOD PRESSURE: 126 MMHG | TEMPERATURE: 98 F | HEART RATE: 80 BPM | WEIGHT: 173.94 LBS | DIASTOLIC BLOOD PRESSURE: 74 MMHG | HEIGHT: 66 IN

## 2021-03-09 DIAGNOSIS — R61 NIGHT SWEATS: ICD-10-CM

## 2021-03-09 DIAGNOSIS — R50.9 FUO (FEVER OF UNKNOWN ORIGIN): ICD-10-CM

## 2021-03-09 DIAGNOSIS — A04.72 C. DIFFICILE COLITIS: Primary | ICD-10-CM

## 2021-03-09 PROCEDURE — 99999 PR PBB SHADOW E&M-EST. PATIENT-LVL V: ICD-10-PCS | Mod: PBBFAC,,, | Performed by: INTERNAL MEDICINE

## 2021-03-09 PROCEDURE — 99205 OFFICE O/P NEW HI 60 MIN: CPT | Mod: S$GLB,,, | Performed by: INTERNAL MEDICINE

## 2021-03-09 PROCEDURE — 99999 PR PBB SHADOW E&M-EST. PATIENT-LVL V: CPT | Mod: PBBFAC,,, | Performed by: INTERNAL MEDICINE

## 2021-03-09 PROCEDURE — 71046 X-RAY EXAM CHEST 2 VIEWS: CPT | Mod: TC,FY

## 2021-03-09 PROCEDURE — 71046 XR CHEST PA AND LATERAL: ICD-10-PCS | Mod: 26,,, | Performed by: RADIOLOGY

## 2021-03-09 PROCEDURE — 99205 PR OFFICE/OUTPT VISIT, NEW, LEVL V, 60-74 MIN: ICD-10-PCS | Mod: S$GLB,,, | Performed by: INTERNAL MEDICINE

## 2021-03-09 PROCEDURE — 99215 OFFICE O/P EST HI 40 MIN: CPT | Mod: PBBFAC,25 | Performed by: INTERNAL MEDICINE

## 2021-03-09 PROCEDURE — 71046 X-RAY EXAM CHEST 2 VIEWS: CPT | Mod: 26,,, | Performed by: RADIOLOGY

## 2021-03-09 RX ORDER — ONDANSETRON 4 MG/1
TABLET, ORALLY DISINTEGRATING ORAL
COMMUNITY
End: 2022-01-18

## 2021-03-09 RX ORDER — VANCOMYCIN HYDROCHLORIDE 125 MG/1
CAPSULE ORAL
Qty: 105 CAPSULE | Refills: 0 | Status: SHIPPED | OUTPATIENT
Start: 2021-03-09 | End: 2021-03-22 | Stop reason: SDUPTHER

## 2021-03-22 ENCOUNTER — TELEPHONE (OUTPATIENT)
Dept: INFECTIOUS DISEASES | Facility: HOSPITAL | Age: 66
End: 2021-03-22

## 2021-03-22 DIAGNOSIS — A04.72 C. DIFFICILE COLITIS: Primary | ICD-10-CM

## 2021-03-22 RX ORDER — VANCOMYCIN HYDROCHLORIDE 125 MG/1
CAPSULE ORAL
Qty: 53 CAPSULE | Refills: 0 | Status: SHIPPED | OUTPATIENT
Start: 2021-03-22 | End: 2021-05-14

## 2021-04-28 ENCOUNTER — PATIENT MESSAGE (OUTPATIENT)
Dept: RESEARCH | Facility: HOSPITAL | Age: 66
End: 2021-04-28

## 2021-05-11 ENCOUNTER — PATIENT MESSAGE (OUTPATIENT)
Dept: INFECTIOUS DISEASES | Facility: CLINIC | Age: 66
End: 2021-05-11

## 2021-05-12 ENCOUNTER — TELEPHONE (OUTPATIENT)
Dept: INFECTIOUS DISEASES | Facility: HOSPITAL | Age: 66
End: 2021-05-12

## 2021-05-12 DIAGNOSIS — R19.7 DIARRHEA, UNSPECIFIED TYPE: Primary | ICD-10-CM

## 2021-05-12 DIAGNOSIS — K92.2 GASTROINTESTINAL HEMORRHAGE, UNSPECIFIED GASTROINTESTINAL HEMORRHAGE TYPE: ICD-10-CM

## 2021-05-13 ENCOUNTER — LAB VISIT (OUTPATIENT)
Dept: LAB | Facility: HOSPITAL | Age: 66
End: 2021-05-13
Attending: INTERNAL MEDICINE
Payer: MEDICARE

## 2021-05-13 DIAGNOSIS — R19.7 DIARRHEA, UNSPECIFIED TYPE: ICD-10-CM

## 2021-05-13 DIAGNOSIS — K92.2 GASTROINTESTINAL HEMORRHAGE, UNSPECIFIED GASTROINTESTINAL HEMORRHAGE TYPE: ICD-10-CM

## 2021-05-13 LAB
ALBUMIN SERPL BCP-MCNC: 3.9 G/DL (ref 3.5–5.2)
ALP SERPL-CCNC: 74 U/L (ref 55–135)
ALT SERPL W/O P-5'-P-CCNC: 14 U/L (ref 10–44)
ANION GAP SERPL CALC-SCNC: 7 MMOL/L (ref 8–16)
AST SERPL-CCNC: 18 U/L (ref 10–40)
BASOPHILS # BLD AUTO: 0 K/UL (ref 0–0.2)
BASOPHILS NFR BLD: 0 % (ref 0–1.9)
BILIRUB SERPL-MCNC: 0.5 MG/DL (ref 0.1–1)
BUN SERPL-MCNC: 33 MG/DL (ref 8–23)
CALCIUM SERPL-MCNC: 9.9 MG/DL (ref 8.7–10.5)
CHLORIDE SERPL-SCNC: 114 MMOL/L (ref 95–110)
CO2 SERPL-SCNC: 21 MMOL/L (ref 23–29)
CREAT SERPL-MCNC: 2 MG/DL (ref 0.5–1.4)
DIFFERENTIAL METHOD: ABNORMAL
EOSINOPHIL # BLD AUTO: 0.1 K/UL (ref 0–0.5)
EOSINOPHIL NFR BLD: 1.3 % (ref 0–8)
ERYTHROCYTE [DISTWIDTH] IN BLOOD BY AUTOMATED COUNT: 14.2 % (ref 11.5–14.5)
EST. GFR  (AFRICAN AMERICAN): 39.3 ML/MIN/1.73 M^2
EST. GFR  (NON AFRICAN AMERICAN): 34 ML/MIN/1.73 M^2
GLUCOSE SERPL-MCNC: 129 MG/DL (ref 70–110)
HCT VFR BLD AUTO: 36 % (ref 40–54)
HGB BLD-MCNC: 11.4 G/DL (ref 14–18)
IMM GRANULOCYTES # BLD AUTO: 0.01 K/UL (ref 0–0.04)
IMM GRANULOCYTES NFR BLD AUTO: 0.2 % (ref 0–0.5)
INR PPP: 1 (ref 0.8–1.2)
LACTATE SERPL-SCNC: 2.3 MMOL/L (ref 0.5–2.2)
LYMPHOCYTES # BLD AUTO: 1.5 K/UL (ref 1–4.8)
LYMPHOCYTES NFR BLD: 29.6 % (ref 18–48)
MCH RBC QN AUTO: 29.6 PG (ref 27–31)
MCHC RBC AUTO-ENTMCNC: 31.7 G/DL (ref 32–36)
MCV RBC AUTO: 94 FL (ref 82–98)
MONOCYTES # BLD AUTO: 0.3 K/UL (ref 0.3–1)
MONOCYTES NFR BLD: 6.3 % (ref 4–15)
NEUTROPHILS # BLD AUTO: 3.3 K/UL (ref 1.8–7.7)
NEUTROPHILS NFR BLD: 62.6 % (ref 38–73)
NRBC BLD-RTO: 0 /100 WBC
PLATELET # BLD AUTO: 181 K/UL (ref 150–450)
PMV BLD AUTO: 10.4 FL (ref 9.2–12.9)
POTASSIUM SERPL-SCNC: 4.7 MMOL/L (ref 3.5–5.1)
PROT SERPL-MCNC: 7.3 G/DL (ref 6–8.4)
PROTHROMBIN TIME: 10.7 SEC (ref 9–12.5)
RBC # BLD AUTO: 3.85 M/UL (ref 4.6–6.2)
SODIUM SERPL-SCNC: 142 MMOL/L (ref 136–145)
WBC # BLD AUTO: 5.21 K/UL (ref 3.9–12.7)

## 2021-05-13 PROCEDURE — 36415 COLL VENOUS BLD VENIPUNCTURE: CPT | Performed by: INTERNAL MEDICINE

## 2021-05-13 PROCEDURE — 85610 PROTHROMBIN TIME: CPT | Performed by: INTERNAL MEDICINE

## 2021-05-13 PROCEDURE — 83605 ASSAY OF LACTIC ACID: CPT | Performed by: INTERNAL MEDICINE

## 2021-05-13 PROCEDURE — 80053 COMPREHEN METABOLIC PANEL: CPT | Performed by: INTERNAL MEDICINE

## 2021-05-13 PROCEDURE — 85025 COMPLETE CBC W/AUTO DIFF WBC: CPT | Performed by: INTERNAL MEDICINE

## 2021-05-14 ENCOUNTER — TELEPHONE (OUTPATIENT)
Dept: INFECTIOUS DISEASES | Facility: CLINIC | Age: 66
End: 2021-05-14

## 2021-05-14 ENCOUNTER — TELEPHONE (OUTPATIENT)
Dept: GASTROENTEROLOGY | Facility: CLINIC | Age: 66
End: 2021-05-14

## 2021-05-14 ENCOUNTER — TELEPHONE (OUTPATIENT)
Dept: INFECTIOUS DISEASES | Facility: HOSPITAL | Age: 66
End: 2021-05-14

## 2021-05-14 DIAGNOSIS — A04.71 RECURRENT CLOSTRIDIOIDES DIFFICILE DIARRHEA: Primary | ICD-10-CM

## 2021-05-14 RX ORDER — VANCOMYCIN HYDROCHLORIDE 125 MG/1
CAPSULE ORAL
Qty: 112 CAPSULE | Refills: 1 | Status: ON HOLD | OUTPATIENT
Start: 2021-05-14 | End: 2021-06-04 | Stop reason: HOSPADM

## 2021-05-17 ENCOUNTER — LAB VISIT (OUTPATIENT)
Dept: LAB | Facility: HOSPITAL | Age: 66
End: 2021-05-17
Attending: FAMILY MEDICINE
Payer: MEDICARE

## 2021-05-17 DIAGNOSIS — A04.71 RECURRENT CLOSTRIDIOIDES DIFFICILE DIARRHEA: ICD-10-CM

## 2021-05-17 LAB
ALBUMIN SERPL BCP-MCNC: 3.7 G/DL (ref 3.5–5.2)
ALP SERPL-CCNC: 72 U/L (ref 55–135)
ALT SERPL W/O P-5'-P-CCNC: 13 U/L (ref 10–44)
ANION GAP SERPL CALC-SCNC: 7 MMOL/L (ref 8–16)
AST SERPL-CCNC: 16 U/L (ref 10–40)
BASOPHILS # BLD AUTO: 0.01 K/UL (ref 0–0.2)
BASOPHILS NFR BLD: 0.2 % (ref 0–1.9)
BILIRUB SERPL-MCNC: 0.4 MG/DL (ref 0.1–1)
BUN SERPL-MCNC: 24 MG/DL (ref 8–23)
CALCIUM SERPL-MCNC: 9.6 MG/DL (ref 8.7–10.5)
CHLORIDE SERPL-SCNC: 117 MMOL/L (ref 95–110)
CO2 SERPL-SCNC: 19 MMOL/L (ref 23–29)
CREAT SERPL-MCNC: 1.5 MG/DL (ref 0.5–1.4)
DIFFERENTIAL METHOD: ABNORMAL
EOSINOPHIL # BLD AUTO: 0.1 K/UL (ref 0–0.5)
EOSINOPHIL NFR BLD: 1.2 % (ref 0–8)
ERYTHROCYTE [DISTWIDTH] IN BLOOD BY AUTOMATED COUNT: 13.8 % (ref 11.5–14.5)
EST. GFR  (AFRICAN AMERICAN): 55.7 ML/MIN/1.73 M^2
EST. GFR  (NON AFRICAN AMERICAN): 48.2 ML/MIN/1.73 M^2
GLUCOSE SERPL-MCNC: 110 MG/DL (ref 70–110)
HCT VFR BLD AUTO: 35 % (ref 40–54)
HGB BLD-MCNC: 11.1 G/DL (ref 14–18)
IMM GRANULOCYTES # BLD AUTO: 0.02 K/UL (ref 0–0.04)
IMM GRANULOCYTES NFR BLD AUTO: 0.4 % (ref 0–0.5)
LACTATE SERPL-SCNC: 1 MMOL/L (ref 0.5–2.2)
LYMPHOCYTES # BLD AUTO: 1.8 K/UL (ref 1–4.8)
LYMPHOCYTES NFR BLD: 36.1 % (ref 18–48)
MCH RBC QN AUTO: 28.6 PG (ref 27–31)
MCHC RBC AUTO-ENTMCNC: 31.7 G/DL (ref 32–36)
MCV RBC AUTO: 90 FL (ref 82–98)
MONOCYTES # BLD AUTO: 0.4 K/UL (ref 0.3–1)
MONOCYTES NFR BLD: 6.9 % (ref 4–15)
NEUTROPHILS # BLD AUTO: 2.8 K/UL (ref 1.8–7.7)
NEUTROPHILS NFR BLD: 55.2 % (ref 38–73)
NRBC BLD-RTO: 0 /100 WBC
PLATELET # BLD AUTO: 180 K/UL (ref 150–450)
PMV BLD AUTO: 10.6 FL (ref 9.2–12.9)
POTASSIUM SERPL-SCNC: 5.2 MMOL/L (ref 3.5–5.1)
PROT SERPL-MCNC: 6.9 G/DL (ref 6–8.4)
RBC # BLD AUTO: 3.88 M/UL (ref 4.6–6.2)
SODIUM SERPL-SCNC: 143 MMOL/L (ref 136–145)
WBC # BLD AUTO: 5.09 K/UL (ref 3.9–12.7)

## 2021-05-17 PROCEDURE — 36415 COLL VENOUS BLD VENIPUNCTURE: CPT | Performed by: INTERNAL MEDICINE

## 2021-05-17 PROCEDURE — 85025 COMPLETE CBC W/AUTO DIFF WBC: CPT | Performed by: INTERNAL MEDICINE

## 2021-05-17 PROCEDURE — 80053 COMPREHEN METABOLIC PANEL: CPT | Performed by: INTERNAL MEDICINE

## 2021-05-17 PROCEDURE — 83605 ASSAY OF LACTIC ACID: CPT | Performed by: INTERNAL MEDICINE

## 2021-05-18 ENCOUNTER — TELEPHONE (OUTPATIENT)
Dept: ENDOSCOPY | Facility: HOSPITAL | Age: 66
End: 2021-05-18

## 2021-05-18 ENCOUNTER — OFFICE VISIT (OUTPATIENT)
Dept: GASTROENTEROLOGY | Facility: CLINIC | Age: 66
End: 2021-05-18
Payer: MEDICARE

## 2021-05-18 ENCOUNTER — LAB VISIT (OUTPATIENT)
Dept: LAB | Facility: HOSPITAL | Age: 66
End: 2021-05-18
Attending: INTERNAL MEDICINE
Payer: MEDICARE

## 2021-05-18 VITALS
HEART RATE: 62 BPM | HEIGHT: 66 IN | WEIGHT: 178.13 LBS | BODY MASS INDEX: 28.63 KG/M2 | SYSTOLIC BLOOD PRESSURE: 149 MMHG | DIASTOLIC BLOOD PRESSURE: 80 MMHG

## 2021-05-18 DIAGNOSIS — Z12.11 SPECIAL SCREENING FOR MALIGNANT NEOPLASMS, COLON: Primary | ICD-10-CM

## 2021-05-18 DIAGNOSIS — A04.71 RECURRENT CLOSTRIDIOIDES DIFFICILE DIARRHEA: ICD-10-CM

## 2021-05-18 DIAGNOSIS — A04.72 CLOSTRIDIUM DIFFICILE COLITIS: Primary | ICD-10-CM

## 2021-05-18 DIAGNOSIS — A49.8 CLOSTRIDIOIDES DIFFICILE INFECTION: ICD-10-CM

## 2021-05-18 DIAGNOSIS — Z80.0 FAMILY HISTORY OF COLON CANCER: ICD-10-CM

## 2021-05-18 LAB
HAV IGM SERPL QL IA: NEGATIVE
HBV CORE AB SERPL QL IA: NEGATIVE
HBV SURFACE AG SERPL QL IA: NEGATIVE
HCV AB SERPL QL IA: NEGATIVE
HIV 1+2 AB+HIV1 P24 AG SERPL QL IA: NEGATIVE

## 2021-05-18 PROCEDURE — 3008F BODY MASS INDEX DOCD: CPT | Mod: CPTII,S$GLB,, | Performed by: INTERNAL MEDICINE

## 2021-05-18 PROCEDURE — 36415 COLL VENOUS BLD VENIPUNCTURE: CPT | Performed by: INTERNAL MEDICINE

## 2021-05-18 PROCEDURE — 86704 HEP B CORE ANTIBODY TOTAL: CPT | Performed by: INTERNAL MEDICINE

## 2021-05-18 PROCEDURE — 86592 SYPHILIS TEST NON-TREP QUAL: CPT | Performed by: INTERNAL MEDICINE

## 2021-05-18 PROCEDURE — 1126F PR PAIN SEVERITY QUANTIFIED, NO PAIN PRESENT: ICD-10-PCS | Mod: S$GLB,,, | Performed by: INTERNAL MEDICINE

## 2021-05-18 PROCEDURE — 86803 HEPATITIS C AB TEST: CPT | Performed by: INTERNAL MEDICINE

## 2021-05-18 PROCEDURE — 1101F PR PT FALLS ASSESS DOC 0-1 FALLS W/OUT INJ PAST YR: ICD-10-PCS | Mod: CPTII,S$GLB,, | Performed by: INTERNAL MEDICINE

## 2021-05-18 PROCEDURE — 99215 PR OFFICE/OUTPT VISIT, EST, LEVL V, 40-54 MIN: ICD-10-PCS | Mod: S$GLB,,, | Performed by: INTERNAL MEDICINE

## 2021-05-18 PROCEDURE — 99215 OFFICE O/P EST HI 40 MIN: CPT | Mod: S$GLB,,, | Performed by: INTERNAL MEDICINE

## 2021-05-18 PROCEDURE — 87340 HEPATITIS B SURFACE AG IA: CPT | Performed by: INTERNAL MEDICINE

## 2021-05-18 PROCEDURE — 3288F FALL RISK ASSESSMENT DOCD: CPT | Mod: CPTII,S$GLB,, | Performed by: INTERNAL MEDICINE

## 2021-05-18 PROCEDURE — 3288F PR FALLS RISK ASSESSMENT DOCUMENTED: ICD-10-PCS | Mod: CPTII,S$GLB,, | Performed by: INTERNAL MEDICINE

## 2021-05-18 PROCEDURE — 1126F AMNT PAIN NOTED NONE PRSNT: CPT | Mod: S$GLB,,, | Performed by: INTERNAL MEDICINE

## 2021-05-18 PROCEDURE — 3008F PR BODY MASS INDEX (BMI) DOCUMENTED: ICD-10-PCS | Mod: CPTII,S$GLB,, | Performed by: INTERNAL MEDICINE

## 2021-05-18 PROCEDURE — 99999 PR PBB SHADOW E&M-EST. PATIENT-LVL IV: CPT | Mod: PBBFAC,,, | Performed by: INTERNAL MEDICINE

## 2021-05-18 PROCEDURE — 86709 HEPATITIS A IGM ANTIBODY: CPT | Performed by: INTERNAL MEDICINE

## 2021-05-18 PROCEDURE — 86703 HIV-1/HIV-2 1 RESULT ANTBDY: CPT | Performed by: INTERNAL MEDICINE

## 2021-05-18 PROCEDURE — 99999 PR PBB SHADOW E&M-EST. PATIENT-LVL IV: ICD-10-PCS | Mod: PBBFAC,,, | Performed by: INTERNAL MEDICINE

## 2021-05-18 PROCEDURE — 1101F PT FALLS ASSESS-DOCD LE1/YR: CPT | Mod: CPTII,S$GLB,, | Performed by: INTERNAL MEDICINE

## 2021-05-18 RX ORDER — SODIUM, POTASSIUM,MAG SULFATES 17.5-3.13G
1 SOLUTION, RECONSTITUTED, ORAL ORAL ONCE
Qty: 1 KIT | Refills: 0 | Status: SHIPPED | OUTPATIENT
Start: 2021-05-18 | End: 2021-05-18

## 2021-05-19 ENCOUNTER — TELEPHONE (OUTPATIENT)
Dept: GASTROENTEROLOGY | Facility: CLINIC | Age: 66
End: 2021-05-19

## 2021-05-19 ENCOUNTER — PATIENT MESSAGE (OUTPATIENT)
Dept: GASTROENTEROLOGY | Facility: CLINIC | Age: 66
End: 2021-05-19

## 2021-05-19 ENCOUNTER — LAB VISIT (OUTPATIENT)
Dept: LAB | Facility: HOSPITAL | Age: 66
End: 2021-05-19
Attending: INTERNAL MEDICINE
Payer: MEDICARE

## 2021-05-19 DIAGNOSIS — A04.71 RECURRENT CLOSTRIDIOIDES DIFFICILE DIARRHEA: ICD-10-CM

## 2021-05-19 DIAGNOSIS — A04.71 RECURRENT CLOSTRIDIOIDES DIFFICILE DIARRHEA: Primary | ICD-10-CM

## 2021-05-19 LAB
C DIFF GDH STL QL: NEGATIVE
C DIFF TOX A+B STL QL IA: NEGATIVE
RPR SER QL: NORMAL

## 2021-05-19 PROCEDURE — 87449 NOS EACH ORGANISM AG IA: CPT | Performed by: INTERNAL MEDICINE

## 2021-05-19 PROCEDURE — 87324 CLOSTRIDIUM AG IA: CPT | Performed by: INTERNAL MEDICINE

## 2021-05-20 ENCOUNTER — PATIENT MESSAGE (OUTPATIENT)
Dept: ENDOSCOPY | Facility: HOSPITAL | Age: 66
End: 2021-05-20

## 2021-05-20 ENCOUNTER — PATIENT MESSAGE (OUTPATIENT)
Dept: GASTROENTEROLOGY | Facility: CLINIC | Age: 66
End: 2021-05-20

## 2021-05-31 ENCOUNTER — TELEPHONE (OUTPATIENT)
Dept: TRANSPLANT | Facility: CLINIC | Age: 66
End: 2021-05-31

## 2021-05-31 DIAGNOSIS — Z29.89 NEED FOR PROPHYLACTIC IMMUNOTHERAPY: Chronic | ICD-10-CM

## 2021-05-31 DIAGNOSIS — Z94.0 DECEASED-DONOR KIDNEY TRANSPLANT: Chronic | ICD-10-CM

## 2021-05-31 RX ORDER — MYCOPHENOLATE MOFETIL 250 MG/1
250 CAPSULE ORAL 2 TIMES DAILY
Qty: 60 CAPSULE | Refills: 11 | Status: SHIPPED | OUTPATIENT
Start: 2021-07-02 | End: 2023-12-18

## 2021-06-01 ENCOUNTER — LAB VISIT (OUTPATIENT)
Dept: OTOLARYNGOLOGY | Facility: CLINIC | Age: 66
End: 2021-06-01
Payer: MEDICARE

## 2021-06-01 DIAGNOSIS — A49.8 CLOSTRIDIOIDES DIFFICILE INFECTION: ICD-10-CM

## 2021-06-01 PROCEDURE — U0003 INFECTIOUS AGENT DETECTION BY NUCLEIC ACID (DNA OR RNA); SEVERE ACUTE RESPIRATORY SYNDROME CORONAVIRUS 2 (SARS-COV-2) (CORONAVIRUS DISEASE [COVID-19]), AMPLIFIED PROBE TECHNIQUE, MAKING USE OF HIGH THROUGHPUT TECHNOLOGIES AS DESCRIBED BY CMS-2020-01-R: HCPCS | Performed by: FAMILY MEDICINE

## 2021-06-01 PROCEDURE — U0005 INFEC AGEN DETEC AMPLI PROBE: HCPCS | Performed by: FAMILY MEDICINE

## 2021-06-02 LAB — SARS-COV-2 RNA RESP QL NAA+PROBE: NOT DETECTED

## 2021-06-04 ENCOUNTER — ANESTHESIA EVENT (OUTPATIENT)
Dept: ENDOSCOPY | Facility: HOSPITAL | Age: 66
End: 2021-06-04
Payer: MEDICARE

## 2021-06-04 ENCOUNTER — ANESTHESIA (OUTPATIENT)
Dept: ENDOSCOPY | Facility: HOSPITAL | Age: 66
End: 2021-06-04
Payer: MEDICARE

## 2021-06-04 ENCOUNTER — HOSPITAL ENCOUNTER (OUTPATIENT)
Facility: HOSPITAL | Age: 66
Discharge: HOME | End: 2021-06-04
Attending: INTERNAL MEDICINE | Admitting: INTERNAL MEDICINE
Payer: MEDICARE

## 2021-06-04 VITALS
BODY MASS INDEX: 27.32 KG/M2 | HEIGHT: 66 IN | DIASTOLIC BLOOD PRESSURE: 64 MMHG | OXYGEN SATURATION: 96 % | HEART RATE: 57 BPM | WEIGHT: 170 LBS | TEMPERATURE: 98 F | RESPIRATION RATE: 18 BRPM | SYSTOLIC BLOOD PRESSURE: 141 MMHG

## 2021-06-04 DIAGNOSIS — A04.71 RECURRENT CLOSTRIDIUM DIFFICILE DIARRHEA: ICD-10-CM

## 2021-06-04 DIAGNOSIS — A04.72 CLOSTRIDIUM DIFFICILE COLITIS: Primary | ICD-10-CM

## 2021-06-04 PROCEDURE — 45378 DIAGNOSTIC COLONOSCOPY: CPT | Performed by: INTERNAL MEDICINE

## 2021-06-04 PROCEDURE — E9220 PRA ENDO ANESTHESIA: HCPCS | Mod: ,,, | Performed by: STUDENT IN AN ORGANIZED HEALTH CARE EDUCATION/TRAINING PROGRAM

## 2021-06-04 PROCEDURE — 37000009 HC ANESTHESIA EA ADD 15 MINS: Performed by: INTERNAL MEDICINE

## 2021-06-04 PROCEDURE — E9220 PRA ENDO ANESTHESIA: ICD-10-PCS | Mod: ,,, | Performed by: STUDENT IN AN ORGANIZED HEALTH CARE EDUCATION/TRAINING PROGRAM

## 2021-06-04 PROCEDURE — 37000008 HC ANESTHESIA 1ST 15 MINUTES: Performed by: INTERNAL MEDICINE

## 2021-06-04 PROCEDURE — 45378 PR COLONOSCOPY,DIAGNOSTIC: ICD-10-PCS | Mod: ,,, | Performed by: INTERNAL MEDICINE

## 2021-06-04 PROCEDURE — 63600175 PHARM REV CODE 636 W HCPCS: Performed by: STUDENT IN AN ORGANIZED HEALTH CARE EDUCATION/TRAINING PROGRAM

## 2021-06-04 PROCEDURE — 25000003 PHARM REV CODE 250: Performed by: STUDENT IN AN ORGANIZED HEALTH CARE EDUCATION/TRAINING PROGRAM

## 2021-06-04 PROCEDURE — 27201681 HC FECAL MICROBIOTA: Performed by: INTERNAL MEDICINE

## 2021-06-04 PROCEDURE — 45378 DIAGNOSTIC COLONOSCOPY: CPT | Mod: ,,, | Performed by: INTERNAL MEDICINE

## 2021-06-04 PROCEDURE — 25000003 PHARM REV CODE 250: Performed by: INTERNAL MEDICINE

## 2021-06-04 RX ORDER — SODIUM CHLORIDE 9 MG/ML
INJECTION, SOLUTION INTRAVENOUS CONTINUOUS
Status: DISCONTINUED | OUTPATIENT
Start: 2021-06-04 | End: 2021-06-04 | Stop reason: HOSPADM

## 2021-06-04 RX ORDER — PROPOFOL 10 MG/ML
VIAL (ML) INTRAVENOUS CONTINUOUS PRN
Status: DISCONTINUED | OUTPATIENT
Start: 2021-06-04 | End: 2021-06-04

## 2021-06-04 RX ORDER — PROPOFOL 10 MG/ML
VIAL (ML) INTRAVENOUS
Status: DISCONTINUED | OUTPATIENT
Start: 2021-06-04 | End: 2021-06-04

## 2021-06-04 RX ORDER — LIDOCAINE HCL/PF 100 MG/5ML
SYRINGE (ML) INTRAVENOUS
Status: DISCONTINUED | OUTPATIENT
Start: 2021-06-04 | End: 2021-06-04

## 2021-06-04 RX ORDER — ONDANSETRON 2 MG/ML
INJECTION INTRAMUSCULAR; INTRAVENOUS
Status: DISCONTINUED | OUTPATIENT
Start: 2021-06-04 | End: 2021-06-04

## 2021-06-04 RX ADMIN — Medication 50 MG: at 02:06

## 2021-06-04 RX ADMIN — ONDANSETRON 4 MG: 2 INJECTION, SOLUTION INTRAMUSCULAR; INTRAVENOUS at 02:06

## 2021-06-04 RX ADMIN — SODIUM CHLORIDE: 9 INJECTION, SOLUTION INTRAVENOUS at 02:06

## 2021-06-04 RX ADMIN — PROPOFOL 50 MCG/KG/MIN: 10 INJECTION, EMULSION INTRAVENOUS at 02:06

## 2021-06-04 RX ADMIN — SODIUM CHLORIDE: 0.9 INJECTION, SOLUTION INTRAVENOUS at 02:06

## 2021-06-04 RX ADMIN — PROPOFOL 60 MG: 10 INJECTION, EMULSION INTRAVENOUS at 02:06

## 2021-06-07 ENCOUNTER — HOSPITAL ENCOUNTER (EMERGENCY)
Facility: HOSPITAL | Age: 66
Discharge: HOME | End: 2021-06-07
Attending: STUDENT IN AN ORGANIZED HEALTH CARE EDUCATION/TRAINING PROGRAM
Payer: MEDICARE

## 2021-06-07 ENCOUNTER — TELEPHONE (OUTPATIENT)
Dept: GASTROENTEROLOGY | Facility: CLINIC | Age: 66
End: 2021-06-07

## 2021-06-07 ENCOUNTER — PATIENT MESSAGE (OUTPATIENT)
Dept: GASTROENTEROLOGY | Facility: CLINIC | Age: 66
End: 2021-06-07

## 2021-06-07 VITALS
BODY MASS INDEX: 28.49 KG/M2 | OXYGEN SATURATION: 95 % | TEMPERATURE: 98 F | WEIGHT: 177.25 LBS | DIASTOLIC BLOOD PRESSURE: 81 MMHG | RESPIRATION RATE: 17 BRPM | HEIGHT: 66 IN | SYSTOLIC BLOOD PRESSURE: 131 MMHG | HEART RATE: 60 BPM

## 2021-06-07 DIAGNOSIS — R10.30 LOWER ABDOMINAL PAIN: Primary | ICD-10-CM

## 2021-06-07 LAB
ALBUMIN SERPL BCP-MCNC: 4.2 G/DL (ref 3.5–5.2)
ALP SERPL-CCNC: 67 U/L (ref 55–135)
ALT SERPL W/O P-5'-P-CCNC: 17 U/L (ref 10–44)
ANION GAP SERPL CALC-SCNC: 12 MMOL/L (ref 8–16)
AST SERPL-CCNC: 19 U/L (ref 10–40)
BASOPHILS # BLD AUTO: 0.01 K/UL (ref 0–0.2)
BASOPHILS NFR BLD: 0.2 % (ref 0–1.9)
BILIRUB SERPL-MCNC: 0.5 MG/DL (ref 0.1–1)
BILIRUB UR QL STRIP: NEGATIVE
BUN SERPL-MCNC: 33 MG/DL (ref 8–23)
CALCIUM SERPL-MCNC: 9.5 MG/DL (ref 8.7–10.5)
CHLORIDE SERPL-SCNC: 114 MMOL/L (ref 95–110)
CLARITY UR: CLEAR
CO2 SERPL-SCNC: 17 MMOL/L (ref 23–29)
COLOR UR: YELLOW
CREAT SERPL-MCNC: 1.7 MG/DL (ref 0.5–1.4)
DIFFERENTIAL METHOD: ABNORMAL
EOSINOPHIL # BLD AUTO: 0.1 K/UL (ref 0–0.5)
EOSINOPHIL NFR BLD: 1.7 % (ref 0–8)
ERYTHROCYTE [DISTWIDTH] IN BLOOD BY AUTOMATED COUNT: 13.8 % (ref 11.5–14.5)
EST. GFR  (AFRICAN AMERICAN): 48 ML/MIN/1.73 M^2
EST. GFR  (NON AFRICAN AMERICAN): 41 ML/MIN/1.73 M^2
GLUCOSE SERPL-MCNC: 99 MG/DL (ref 70–110)
GLUCOSE UR QL STRIP: NEGATIVE
HCT VFR BLD AUTO: 36.3 % (ref 40–54)
HGB BLD-MCNC: 11.4 G/DL (ref 14–18)
HGB UR QL STRIP: NEGATIVE
IMM GRANULOCYTES # BLD AUTO: 0.03 K/UL (ref 0–0.04)
IMM GRANULOCYTES NFR BLD AUTO: 0.5 % (ref 0–0.5)
KETONES UR QL STRIP: NEGATIVE
LEUKOCYTE ESTERASE UR QL STRIP: NEGATIVE
LIPASE SERPL-CCNC: 32 U/L (ref 4–60)
LYMPHOCYTES # BLD AUTO: 2.1 K/UL (ref 1–4.8)
LYMPHOCYTES NFR BLD: 35.4 % (ref 18–48)
MCH RBC QN AUTO: 28.5 PG (ref 27–31)
MCHC RBC AUTO-ENTMCNC: 31.4 G/DL (ref 32–36)
MCV RBC AUTO: 91 FL (ref 82–98)
MONOCYTES # BLD AUTO: 0.4 K/UL (ref 0.3–1)
MONOCYTES NFR BLD: 6.3 % (ref 4–15)
NEUTROPHILS # BLD AUTO: 3.4 K/UL (ref 1.8–7.7)
NEUTROPHILS NFR BLD: 55.9 % (ref 38–73)
NITRITE UR QL STRIP: NEGATIVE
NRBC BLD-RTO: 0 /100 WBC
PH UR STRIP: 6 [PH] (ref 5–8)
PLATELET # BLD AUTO: 187 K/UL (ref 150–450)
PMV BLD AUTO: 10.3 FL (ref 9.2–12.9)
POTASSIUM SERPL-SCNC: 5.4 MMOL/L (ref 3.5–5.1)
PROT SERPL-MCNC: 7.6 G/DL (ref 6–8.4)
PROT UR QL STRIP: NEGATIVE
RBC # BLD AUTO: 4 M/UL (ref 4.6–6.2)
SODIUM SERPL-SCNC: 143 MMOL/L (ref 136–145)
SP GR UR STRIP: 1.02 (ref 1–1.03)
URN SPEC COLLECT METH UR: NORMAL
UROBILINOGEN UR STRIP-ACNC: NEGATIVE EU/DL
WBC # BLD AUTO: 6.02 K/UL (ref 3.9–12.7)

## 2021-06-07 PROCEDURE — 87040 BLOOD CULTURE FOR BACTERIA: CPT | Mod: 59 | Performed by: STUDENT IN AN ORGANIZED HEALTH CARE EDUCATION/TRAINING PROGRAM

## 2021-06-07 PROCEDURE — 85025 COMPLETE CBC W/AUTO DIFF WBC: CPT | Performed by: NURSE PRACTITIONER

## 2021-06-07 PROCEDURE — 63600175 PHARM REV CODE 636 W HCPCS: Performed by: NURSE PRACTITIONER

## 2021-06-07 PROCEDURE — 99285 EMERGENCY DEPT VISIT HI MDM: CPT | Mod: 25

## 2021-06-07 PROCEDURE — 83690 ASSAY OF LIPASE: CPT | Performed by: NURSE PRACTITIONER

## 2021-06-07 PROCEDURE — 80053 COMPREHEN METABOLIC PANEL: CPT | Performed by: NURSE PRACTITIONER

## 2021-06-07 PROCEDURE — 81003 URINALYSIS AUTO W/O SCOPE: CPT | Performed by: NURSE PRACTITIONER

## 2021-06-07 PROCEDURE — 96374 THER/PROPH/DIAG INJ IV PUSH: CPT

## 2021-06-07 RX ORDER — ONDANSETRON 2 MG/ML
4 INJECTION INTRAMUSCULAR; INTRAVENOUS
Status: COMPLETED | OUTPATIENT
Start: 2021-06-07 | End: 2021-06-07

## 2021-06-07 RX ADMIN — ONDANSETRON 4 MG: 2 INJECTION INTRAMUSCULAR; INTRAVENOUS at 07:06

## 2021-06-09 ENCOUNTER — PATIENT MESSAGE (OUTPATIENT)
Dept: GASTROENTEROLOGY | Facility: CLINIC | Age: 66
End: 2021-06-09

## 2021-06-12 ENCOUNTER — TELEPHONE (OUTPATIENT)
Dept: EMERGENCY MEDICINE | Facility: HOSPITAL | Age: 66
End: 2021-06-12

## 2021-06-13 LAB — BACTERIA BLD CULT: NORMAL

## 2021-06-14 LAB
BACTERIA BLD CULT: ABNORMAL

## 2021-06-23 ENCOUNTER — PATIENT MESSAGE (OUTPATIENT)
Dept: TRANSPLANT | Facility: CLINIC | Age: 66
End: 2021-06-23

## 2021-06-25 ENCOUNTER — IMMUNIZATION (OUTPATIENT)
Dept: INTERNAL MEDICINE | Facility: CLINIC | Age: 66
End: 2021-06-25
Payer: MEDICARE

## 2021-06-25 DIAGNOSIS — Z23 NEED FOR VACCINATION: Primary | ICD-10-CM

## 2021-06-25 PROCEDURE — 91300 COVID-19, MRNA, LNP-S, PF, 30 MCG/0.3 ML DOSE VACCINE: CPT | Mod: PBBFAC | Performed by: FAMILY MEDICINE

## 2021-07-01 ENCOUNTER — PATIENT MESSAGE (OUTPATIENT)
Dept: TRANSPLANT | Facility: CLINIC | Age: 66
End: 2021-07-01

## 2021-07-01 DIAGNOSIS — Z94.0 DECEASED-DONOR KIDNEY TRANSPLANT: Chronic | ICD-10-CM

## 2021-07-01 DIAGNOSIS — Z29.89 NEED FOR PROPHYLACTIC IMMUNOTHERAPY: Chronic | ICD-10-CM

## 2021-07-01 RX ORDER — TACROLIMUS 1 MG/1
1 CAPSULE ORAL EVERY 12 HOURS
Qty: 60 CAPSULE | Refills: 11 | Status: SHIPPED | OUTPATIENT
Start: 2021-07-01 | End: 2021-09-22

## 2021-07-20 ENCOUNTER — PATIENT MESSAGE (OUTPATIENT)
Dept: TRANSPLANT | Facility: CLINIC | Age: 66
End: 2021-07-20

## 2021-07-21 ENCOUNTER — IMMUNIZATION (OUTPATIENT)
Dept: INTERNAL MEDICINE | Facility: CLINIC | Age: 66
End: 2021-07-21
Payer: MEDICARE

## 2021-07-21 DIAGNOSIS — Z23 NEED FOR VACCINATION: Primary | ICD-10-CM

## 2021-07-21 PROCEDURE — 0002A COVID-19, MRNA, LNP-S, PF, 30 MCG/0.3 ML DOSE VACCINE: CPT | Mod: PBBFAC | Performed by: FAMILY MEDICINE

## 2021-07-21 PROCEDURE — 91300 COVID-19, MRNA, LNP-S, PF, 30 MCG/0.3 ML DOSE VACCINE: CPT | Mod: PBBFAC | Performed by: FAMILY MEDICINE

## 2021-07-22 ENCOUNTER — HOSPITAL ENCOUNTER (EMERGENCY)
Facility: HOSPITAL | Age: 66
Discharge: HOME | End: 2021-07-23
Attending: EMERGENCY MEDICINE
Payer: MEDICARE

## 2021-07-22 DIAGNOSIS — U07.1 COVID-19 VIRUS INFECTION: Primary | ICD-10-CM

## 2021-07-22 LAB
ALBUMIN SERPL BCP-MCNC: 3.9 G/DL (ref 3.5–5.2)
ALP SERPL-CCNC: 74 U/L (ref 55–135)
ALT SERPL W/O P-5'-P-CCNC: 17 U/L (ref 10–44)
ANION GAP SERPL CALC-SCNC: 12 MMOL/L (ref 8–16)
AST SERPL-CCNC: 23 U/L (ref 10–40)
BASOPHILS # BLD AUTO: 0 K/UL (ref 0–0.2)
BASOPHILS NFR BLD: 0 % (ref 0–1.9)
BILIRUB SERPL-MCNC: 0.4 MG/DL (ref 0.1–1)
BUN SERPL-MCNC: 24 MG/DL (ref 8–23)
CALCIUM SERPL-MCNC: 9.1 MG/DL (ref 8.7–10.5)
CHLORIDE SERPL-SCNC: 110 MMOL/L (ref 95–110)
CO2 SERPL-SCNC: 15 MMOL/L (ref 23–29)
CREAT SERPL-MCNC: 1.6 MG/DL (ref 0.5–1.4)
DIFFERENTIAL METHOD: ABNORMAL
EOSINOPHIL # BLD AUTO: 0 K/UL (ref 0–0.5)
EOSINOPHIL NFR BLD: 0.2 % (ref 0–8)
ERYTHROCYTE [DISTWIDTH] IN BLOOD BY AUTOMATED COUNT: 14.5 % (ref 11.5–14.5)
EST. GFR  (AFRICAN AMERICAN): 51 ML/MIN/1.73 M^2
EST. GFR  (NON AFRICAN AMERICAN): 45 ML/MIN/1.73 M^2
GLUCOSE SERPL-MCNC: 107 MG/DL (ref 70–110)
HCT VFR BLD AUTO: 32.5 % (ref 40–54)
HGB BLD-MCNC: 10.2 G/DL (ref 14–18)
IMM GRANULOCYTES # BLD AUTO: 0.01 K/UL (ref 0–0.04)
IMM GRANULOCYTES NFR BLD AUTO: 0.2 % (ref 0–0.5)
LYMPHOCYTES # BLD AUTO: 1.5 K/UL (ref 1–4.8)
LYMPHOCYTES NFR BLD: 33.8 % (ref 18–48)
MCH RBC QN AUTO: 28.9 PG (ref 27–31)
MCHC RBC AUTO-ENTMCNC: 31.4 G/DL (ref 32–36)
MCV RBC AUTO: 92 FL (ref 82–98)
MONOCYTES # BLD AUTO: 0.4 K/UL (ref 0.3–1)
MONOCYTES NFR BLD: 8.2 % (ref 4–15)
NEUTROPHILS # BLD AUTO: 2.5 K/UL (ref 1.8–7.7)
NEUTROPHILS NFR BLD: 57.6 % (ref 38–73)
NRBC BLD-RTO: 0 /100 WBC
PLATELET # BLD AUTO: 148 K/UL (ref 150–450)
PMV BLD AUTO: 9.6 FL (ref 9.2–12.9)
POTASSIUM SERPL-SCNC: 4.3 MMOL/L (ref 3.5–5.1)
PROT SERPL-MCNC: 7.2 G/DL (ref 6–8.4)
RBC # BLD AUTO: 3.53 M/UL (ref 4.6–6.2)
SODIUM SERPL-SCNC: 137 MMOL/L (ref 136–145)
WBC # BLD AUTO: 4.41 K/UL (ref 3.9–12.7)

## 2021-07-22 PROCEDURE — 36415 COLL VENOUS BLD VENIPUNCTURE: CPT | Performed by: PHYSICIAN ASSISTANT

## 2021-07-22 PROCEDURE — 36000 PLACE NEEDLE IN VEIN: CPT

## 2021-07-22 PROCEDURE — 80053 COMPREHEN METABOLIC PANEL: CPT | Performed by: PHYSICIAN ASSISTANT

## 2021-07-22 PROCEDURE — 85025 COMPLETE CBC W/AUTO DIFF WBC: CPT | Performed by: PHYSICIAN ASSISTANT

## 2021-07-22 PROCEDURE — 99284 EMERGENCY DEPT VISIT MOD MDM: CPT | Mod: 25

## 2021-07-22 PROCEDURE — 80197 ASSAY OF TACROLIMUS: CPT | Performed by: PHYSICIAN ASSISTANT

## 2021-07-23 VITALS
SYSTOLIC BLOOD PRESSURE: 137 MMHG | BODY MASS INDEX: 28.68 KG/M2 | WEIGHT: 177.69 LBS | OXYGEN SATURATION: 93 % | RESPIRATION RATE: 20 BRPM | HEART RATE: 80 BPM | TEMPERATURE: 100 F | DIASTOLIC BLOOD PRESSURE: 72 MMHG

## 2021-07-23 DIAGNOSIS — Z94.0 DECEASED-DONOR KIDNEY TRANSPLANT: Primary | ICD-10-CM

## 2021-07-23 DIAGNOSIS — C44.99 RECURRENT SKIN CANCER: ICD-10-CM

## 2021-07-23 DIAGNOSIS — Z29.89 NEED FOR PROPHYLACTIC IMMUNOTHERAPY: ICD-10-CM

## 2021-07-23 RX ORDER — DIPHENHYDRAMINE HYDROCHLORIDE 50 MG/ML
25 INJECTION INTRAMUSCULAR; INTRAVENOUS ONCE AS NEEDED
Status: CANCELLED | OUTPATIENT
Start: 2021-07-23 | End: 2032-12-19

## 2021-07-23 RX ORDER — SODIUM CHLORIDE 0.9 % (FLUSH) 0.9 %
10 SYRINGE (ML) INJECTION
Status: CANCELLED | OUTPATIENT
Start: 2021-07-23

## 2021-07-23 RX ORDER — EPINEPHRINE 0.3 MG/.3ML
0.3 INJECTION SUBCUTANEOUS
Status: CANCELLED | OUTPATIENT
Start: 2021-07-23

## 2021-07-23 RX ORDER — ACETAMINOPHEN 325 MG/1
650 TABLET ORAL ONCE AS NEEDED
Status: CANCELLED | OUTPATIENT
Start: 2021-07-23 | End: 2032-12-19

## 2021-07-23 RX ORDER — ONDANSETRON 4 MG/1
4 TABLET, ORALLY DISINTEGRATING ORAL ONCE AS NEEDED
Status: CANCELLED | OUTPATIENT
Start: 2021-07-23 | End: 2032-12-19

## 2021-07-23 RX ORDER — ALBUTEROL SULFATE 90 UG/1
2 AEROSOL, METERED RESPIRATORY (INHALATION)
Status: CANCELLED | OUTPATIENT
Start: 2021-07-23

## 2021-07-24 ENCOUNTER — INFUSION (OUTPATIENT)
Dept: INFECTIOUS DISEASES | Facility: HOSPITAL | Age: 66
End: 2021-07-24
Attending: INTERNAL MEDICINE
Payer: MEDICARE

## 2021-07-24 VITALS
HEART RATE: 79 BPM | DIASTOLIC BLOOD PRESSURE: 57 MMHG | TEMPERATURE: 101 F | SYSTOLIC BLOOD PRESSURE: 114 MMHG | OXYGEN SATURATION: 96 % | RESPIRATION RATE: 18 BRPM

## 2021-07-24 DIAGNOSIS — C44.99 RECURRENT SKIN CANCER: ICD-10-CM

## 2021-07-24 DIAGNOSIS — Z94.0 DECEASED-DONOR KIDNEY TRANSPLANT: ICD-10-CM

## 2021-07-24 DIAGNOSIS — Z29.89 NEED FOR PROPHYLACTIC IMMUNOTHERAPY: Primary | ICD-10-CM

## 2021-07-24 LAB
TACROLIMUS BLD-MCNC: 5.7 NG/ML (ref 5–15)
TACROLIMUS, NORMALIZED: 5.2 NG/ML (ref 5–15)

## 2021-07-24 PROCEDURE — 63600175 PHARM REV CODE 636 W HCPCS: Performed by: INTERNAL MEDICINE

## 2021-07-24 PROCEDURE — 25000003 PHARM REV CODE 250: Performed by: INTERNAL MEDICINE

## 2021-07-24 PROCEDURE — M0243 CASIRIVI AND IMDEVI INFUSION: HCPCS | Performed by: INTERNAL MEDICINE

## 2021-07-24 RX ORDER — DIPHENHYDRAMINE HYDROCHLORIDE 50 MG/ML
25 INJECTION INTRAMUSCULAR; INTRAVENOUS ONCE AS NEEDED
Status: DISCONTINUED | OUTPATIENT
Start: 2021-07-24 | End: 2022-01-18

## 2021-07-24 RX ORDER — ALBUTEROL SULFATE 90 UG/1
2 AEROSOL, METERED RESPIRATORY (INHALATION)
Status: DISCONTINUED | OUTPATIENT
Start: 2021-07-24 | End: 2022-01-18

## 2021-07-24 RX ORDER — ONDANSETRON 4 MG/1
4 TABLET, ORALLY DISINTEGRATING ORAL ONCE AS NEEDED
Status: DISCONTINUED | OUTPATIENT
Start: 2021-07-24 | End: 2022-01-18

## 2021-07-24 RX ORDER — ACETAMINOPHEN 325 MG/1
650 TABLET ORAL ONCE AS NEEDED
Status: DISCONTINUED | OUTPATIENT
Start: 2021-07-24 | End: 2022-01-18

## 2021-07-24 RX ORDER — EPINEPHRINE 0.3 MG/.3ML
0.3 INJECTION SUBCUTANEOUS
Status: DISCONTINUED | OUTPATIENT
Start: 2021-07-24 | End: 2022-01-18

## 2021-07-24 RX ORDER — SODIUM CHLORIDE 0.9 % (FLUSH) 0.9 %
10 SYRINGE (ML) INJECTION
Status: DISCONTINUED | OUTPATIENT
Start: 2021-07-24 | End: 2022-01-18

## 2021-07-24 RX ADMIN — CASIRIVIMAB 600 MG: 1332 INJECTION, SOLUTION, CONCENTRATE INTRAVENOUS at 08:07

## 2021-08-26 ENCOUNTER — TELEPHONE (OUTPATIENT)
Dept: TRANSPLANT | Facility: CLINIC | Age: 66
End: 2021-08-26

## 2021-09-13 ENCOUNTER — PATIENT MESSAGE (OUTPATIENT)
Dept: TRANSPLANT | Facility: CLINIC | Age: 66
End: 2021-09-13

## 2021-09-16 ENCOUNTER — DOCUMENTATION ONLY (OUTPATIENT)
Dept: TRANSPLANT | Facility: CLINIC | Age: 66
End: 2021-09-16

## 2021-09-16 LAB
EXT ALBUMIN: 4 (ref 3.7–5.3)
EXT ALKALINE PHOSPHATASE: 76 (ref 34–154)
EXT ALT: 13 (ref 7–55)
EXT AST: 19 (ref 8–35)
EXT BACTERIA UA: ABNORMAL
EXT BILIRUBIN DIRECT: 0.2 MG/DL (ref 0–0.4)
EXT BILIRUBIN TOTAL: 0.6 (ref 0.3–1.2)
EXT BUN: 17 (ref 6–20)
EXT CALCIUM: 9.4 (ref 8.5–10.5)
EXT CHLORIDE: 144 (ref 101–112)
EXT CO2: 22 (ref 20–32)
EXT CREATININE: 1.41 MG/DL (ref 0.6–1.2)
EXT EOSINOPHIL%: 0.2 (ref 0–12)
EXT GFR MDRD NON AF AMER: 50
EXT GLUCOSE UA: NEGATIVE
EXT GLUCOSE: 113 (ref 74–106)
EXT HEMATOCRIT: 36.7 (ref 43.5–53.7)
EXT HEMOGLOBIN: 11.1 (ref 14.1–18.1)
EXT LYMPH%: 27.8 (ref 10–50)
EXT MAGNESIUM: 1.7 (ref 1.3–2.1)
EXT MONOCYTES%: 0.5 (ref 0.1–0.6)
EXT NITRITES UA: NEGATIVE
EXT PHOSPHORUS: 2.8 (ref 2.7–4.5)
EXT PLATELETS: 190 (ref 142–424)
EXT POTASSIUM: 4.9 (ref 3.4–5.1)
EXT PROT/CREAT RATIO UR: 0.2
EXT PROTEIN TOTAL: 7.2 (ref 6.4–8.3)
EXT PROTEIN UA: ABNORMAL
EXT RBC UA: ABNORMAL
EXT SEGS%: 61.5 (ref 37–80)
EXT SODIUM: 143 MMOL/L (ref 135–145)
EXT TACROLIMUS LVL: 6.3 (ref 5–19.9)
EXT WBC UA: ABNORMAL
EXT WBC: 6.2 (ref 4.6–10.2)
MESSAGE:: ABNORMAL

## 2021-09-22 ENCOUNTER — PATIENT MESSAGE (OUTPATIENT)
Dept: TRANSPLANT | Facility: CLINIC | Age: 66
End: 2021-09-22

## 2021-09-22 ENCOUNTER — OFFICE VISIT (OUTPATIENT)
Dept: TRANSPLANT | Facility: CLINIC | Age: 66
End: 2021-09-22
Payer: MEDICARE

## 2021-09-22 DIAGNOSIS — Z94.0 S/P CADAVER RENAL TRANSPLANT: Primary | ICD-10-CM

## 2021-09-22 DIAGNOSIS — I10 ESSENTIAL HYPERTENSION: Chronic | ICD-10-CM

## 2021-09-22 DIAGNOSIS — C44.99 RECURRENT SKIN CANCER: Chronic | ICD-10-CM

## 2021-09-22 DIAGNOSIS — I25.10 CORONARY ARTERY DISEASE INVOLVING NATIVE CORONARY ARTERY OF NATIVE HEART WITHOUT ANGINA PECTORIS: Chronic | ICD-10-CM

## 2021-09-22 DIAGNOSIS — N18.31 STAGE 3A CHRONIC KIDNEY DISEASE: Chronic | ICD-10-CM

## 2021-09-22 DIAGNOSIS — Z91.89 AT RISK FOR OPPORTUNISTIC INFECTIONS: ICD-10-CM

## 2021-09-22 DIAGNOSIS — Z29.89 NEED FOR PROPHYLACTIC IMMUNOTHERAPY: Chronic | ICD-10-CM

## 2021-09-22 DIAGNOSIS — Z94.0 DECEASED-DONOR KIDNEY TRANSPLANT: Chronic | ICD-10-CM

## 2021-09-22 PROCEDURE — 99213 OFFICE O/P EST LOW 20 MIN: CPT | Mod: 95,,, | Performed by: NURSE PRACTITIONER

## 2021-09-22 PROCEDURE — 1159F PR MEDICATION LIST DOCUMENTED IN MEDICAL RECORD: ICD-10-PCS | Mod: CPTII,95,, | Performed by: NURSE PRACTITIONER

## 2021-09-22 PROCEDURE — 99213 PR OFFICE/OUTPT VISIT, EST, LEVL III, 20-29 MIN: ICD-10-PCS | Mod: 95,,, | Performed by: NURSE PRACTITIONER

## 2021-09-22 PROCEDURE — 99499 UNLISTED E&M SERVICE: CPT | Mod: 95,,, | Performed by: NURSE PRACTITIONER

## 2021-09-22 PROCEDURE — 1160F RVW MEDS BY RX/DR IN RCRD: CPT | Mod: CPTII,95,, | Performed by: NURSE PRACTITIONER

## 2021-09-22 PROCEDURE — 1159F MED LIST DOCD IN RCRD: CPT | Mod: CPTII,95,, | Performed by: NURSE PRACTITIONER

## 2021-09-22 PROCEDURE — 1160F PR REVIEW ALL MEDS BY PRESCRIBER/CLIN PHARMACIST DOCUMENTED: ICD-10-PCS | Mod: CPTII,95,, | Performed by: NURSE PRACTITIONER

## 2021-09-22 PROCEDURE — 99499 RISK ADDL DX/OHS AUDIT: ICD-10-PCS | Mod: 95,,, | Performed by: NURSE PRACTITIONER

## 2021-09-22 RX ORDER — TACROLIMUS 1 MG/1
1 CAPSULE ORAL EVERY 12 HOURS
Qty: 60 CAPSULE | Refills: 11 | Status: CANCELLED | OUTPATIENT
Start: 2021-09-22 | End: 2022-09-22

## 2021-09-22 RX ORDER — TACROLIMUS 0.5 MG/1
0.5 CAPSULE ORAL NIGHTLY
COMMUNITY
Start: 2021-08-06 | End: 2023-12-12 | Stop reason: DRUGHIGH

## 2021-09-22 RX ORDER — TACROLIMUS 1 MG/1
1 CAPSULE ORAL EVERY MORNING
COMMUNITY
Start: 2021-07-06 | End: 2022-07-28

## 2021-09-22 RX ORDER — MYCOPHENOLATE MOFETIL 250 MG/1
250 CAPSULE ORAL 2 TIMES DAILY
Qty: 60 CAPSULE | Refills: 11 | Status: CANCELLED | OUTPATIENT
Start: 2021-09-22 | End: 2022-09-22

## 2022-01-04 ENCOUNTER — OFFICE VISIT (OUTPATIENT)
Dept: URGENT CARE | Facility: CLINIC | Age: 67
End: 2022-01-04
Payer: MEDICARE

## 2022-01-04 VITALS
DIASTOLIC BLOOD PRESSURE: 67 MMHG | OXYGEN SATURATION: 96 % | RESPIRATION RATE: 16 BRPM | BODY MASS INDEX: 28.45 KG/M2 | HEART RATE: 72 BPM | TEMPERATURE: 98 F | SYSTOLIC BLOOD PRESSURE: 142 MMHG | WEIGHT: 177 LBS | HEIGHT: 66 IN

## 2022-01-04 DIAGNOSIS — R05.9 COUGH: ICD-10-CM

## 2022-01-04 DIAGNOSIS — Z86.16 HISTORY OF COVID-19: ICD-10-CM

## 2022-01-04 DIAGNOSIS — H66.002 NON-RECURRENT ACUTE SUPPURATIVE OTITIS MEDIA OF LEFT EAR WITHOUT SPONTANEOUS RUPTURE OF TYMPANIC MEMBRANE: Primary | ICD-10-CM

## 2022-01-04 PROCEDURE — 3008F PR BODY MASS INDEX (BMI) DOCUMENTED: ICD-10-PCS | Mod: CPTII,S$GLB,, | Performed by: PHYSICIAN ASSISTANT

## 2022-01-04 PROCEDURE — 3078F PR MOST RECENT DIASTOLIC BLOOD PRESSURE < 80 MM HG: ICD-10-PCS | Mod: CPTII,S$GLB,, | Performed by: PHYSICIAN ASSISTANT

## 2022-01-04 PROCEDURE — 1125F AMNT PAIN NOTED PAIN PRSNT: CPT | Mod: CPTII,S$GLB,, | Performed by: PHYSICIAN ASSISTANT

## 2022-01-04 PROCEDURE — 99214 OFFICE O/P EST MOD 30 MIN: CPT | Mod: S$GLB,,, | Performed by: PHYSICIAN ASSISTANT

## 2022-01-04 PROCEDURE — 1160F PR REVIEW ALL MEDS BY PRESCRIBER/CLIN PHARMACIST DOCUMENTED: ICD-10-PCS | Mod: CPTII,S$GLB,, | Performed by: PHYSICIAN ASSISTANT

## 2022-01-04 PROCEDURE — 3077F SYST BP >= 140 MM HG: CPT | Mod: CPTII,S$GLB,, | Performed by: PHYSICIAN ASSISTANT

## 2022-01-04 PROCEDURE — 3077F PR MOST RECENT SYSTOLIC BLOOD PRESSURE >= 140 MM HG: ICD-10-PCS | Mod: CPTII,S$GLB,, | Performed by: PHYSICIAN ASSISTANT

## 2022-01-04 PROCEDURE — 1125F PR PAIN SEVERITY QUANTIFIED, PAIN PRESENT: ICD-10-PCS | Mod: CPTII,S$GLB,, | Performed by: PHYSICIAN ASSISTANT

## 2022-01-04 PROCEDURE — 99214 PR OFFICE/OUTPT VISIT, EST, LEVL IV, 30-39 MIN: ICD-10-PCS | Mod: S$GLB,,, | Performed by: PHYSICIAN ASSISTANT

## 2022-01-04 PROCEDURE — 1160F RVW MEDS BY RX/DR IN RCRD: CPT | Mod: CPTII,S$GLB,, | Performed by: PHYSICIAN ASSISTANT

## 2022-01-04 PROCEDURE — 3008F BODY MASS INDEX DOCD: CPT | Mod: CPTII,S$GLB,, | Performed by: PHYSICIAN ASSISTANT

## 2022-01-04 PROCEDURE — 3078F DIAST BP <80 MM HG: CPT | Mod: CPTII,S$GLB,, | Performed by: PHYSICIAN ASSISTANT

## 2022-01-04 PROCEDURE — 1159F PR MEDICATION LIST DOCUMENTED IN MEDICAL RECORD: ICD-10-PCS | Mod: CPTII,S$GLB,, | Performed by: PHYSICIAN ASSISTANT

## 2022-01-04 PROCEDURE — 1159F MED LIST DOCD IN RCRD: CPT | Mod: CPTII,S$GLB,, | Performed by: PHYSICIAN ASSISTANT

## 2022-01-04 RX ORDER — BENZONATATE 100 MG/1
200 CAPSULE ORAL 3 TIMES DAILY PRN
Qty: 30 CAPSULE | Refills: 0 | Status: SHIPPED | OUTPATIENT
Start: 2022-01-04 | End: 2022-01-18

## 2022-01-04 RX ORDER — CEFDINIR 300 MG/1
300 CAPSULE ORAL 2 TIMES DAILY
Qty: 20 CAPSULE | Refills: 0 | Status: SHIPPED | OUTPATIENT
Start: 2022-01-04 | End: 2022-01-18

## 2022-01-04 NOTE — PATIENT INSTRUCTIONS
Patient Education       Ear Infection ED   General Information   You came to the Emergency Department (ED) for an ear infection. An ear infection can cause ear pain and fever. You might also have trouble hearing from fluid buildup in the middle ear behind the eardrum.  Most ear infections are caused by viruses, but some are caused by bacteria. The doctor will wait to see if you get better on your own if they think the cause is a virus. The doctor will order antibiotic if they think the cause is a bacteria. Antibiotics kill bacteria, but they do not work on viruses.  If the doctor orders antibiotics, be sure to take all of them, even if you start to feel better.  What care is needed at home?   · Call your regular doctor to let them know you were in the ED. Make a follow-up appointment if you were told to.  · Do not put anything in your ear unless it was ordered by the doctor.  · You may want to take medicines like ibuprofen, naproxen, or acetaminophen to help with pain.  When do I need to call the doctor?   · Your symptoms are not getting better in 2 to 3 days.  · You continue to have problems hearing after 2 to 3 weeks.  · You have a fever of 100.4°F (38°C) or higher or chills.  · You have discharge or fluid coming from your ear.  · You have new or worsening symptoms.  Last Reviewed Date   2020-12-16  Consumer Information Use and Disclaimer   This information is not specific medical advice and does not replace information you receive from your health care provider. This is only a brief summary of general information. It does NOT include all information about conditions, illnesses, injuries, tests, procedures, treatments, therapies, discharge instructions or life-style choices that may apply to you. You must talk with your health care provider for complete information about your health and treatment options. This information should not be used to decide whether or not to accept your health care providers advice,  instructions or recommendations. Only your health care provider has the knowledge and training to provide advice that is right for you.  Copyright   Copyright © 2021 [employer] and its affiliates and/or licensors. All rights reserved.    Please follow up with your Primary care provider within 2-5 days if your signs and symptoms have not resolved or worsen.     If your condition worsens or fails to improve we recommend that you receive another evaluation at the emergency room immediately or contact your primary medical clinic to discuss your concerns.   You must understand that you have received an Urgent Care treatment only and that you may be released before all of your medical problems are known or treated. You, the patient, will arrange for follow up care as instructed.     RED FLAGS/WARNING SYMPTOMS DISCUSSED WITH PATIENT THAT WOULD WARRANT EMERGENT MEDICAL ATTENTION. PATIENT VERBALIZED UNDERSTANDING.

## 2022-01-04 NOTE — PROGRESS NOTES
"Subjective:       Patient ID: Raymundo Restrepo is a 66 y.o. male.    Vitals:  height is 5' 6" (1.676 m) and weight is 80.3 kg (177 lb). His temperature is 98 °F (36.7 °C). His blood pressure is 142/67 (abnormal) and his pulse is 72. His respiration is 16 and oxygen saturation is 96%.     Chief Complaint: Otalgia     Pt c/o left ear pain-feels clogged, cough, congestion, headache, hearing loss. Pt recently tested positive for covid on 12/23/2021; tested negative on 12/30/2021. Recently flew back from [place] and states ear pain was excruciating during flight.  Reports decreased hearing in the left ear and intermittent sharp pains.  He reports that the year was very tender to touch but that has improved slightly after starting some old Ciprodex drops that he had.  Denies any fever or dizziness. States cough ok during day but kept him up last night.     Otalgia   There is pain in the left ear. This is a new problem. The current episode started 1 to 4 weeks ago (past 8 days). The problem occurs constantly. The problem has been unchanged. There has been no fever. The pain is at a severity of 6/10. The pain is moderate. Associated symptoms include coughing, headaches, hearing loss (greenish phlegm), rhinorrhea and a sore throat. Pertinent negatives include no abdominal pain, diarrhea, ear discharge, neck pain, rash or vomiting. He has tried ear drops (tylenol, vicks nyquil sinus) for the symptoms. The treatment provided mild relief.       Constitution: Positive for fatigue. Negative for chills, sweating and fever.   HENT: Positive for ear pain, hearing loss (greenish phlegm), congestion and sore throat. Negative for ear discharge and tinnitus.    Neck: Negative for neck pain.   Cardiovascular: Negative.    Respiratory: Positive for cough. Negative for chest tightness, shortness of breath and wheezing.    Gastrointestinal: Negative for abdominal pain, vomiting and diarrhea.   Musculoskeletal: Negative.    Skin: Negative for " rash.   Neurological: Positive for headaches. Negative for dizziness and history of vertigo.       Objective:      Physical Exam   Constitutional: He appears well-developed.  Non-toxic appearance. He does not appear ill. No distress.   HENT:   Head: Normocephalic and atraumatic.   Ears:   Right Ear: Tympanic membrane, external ear and ear canal normal.   Left Ear: External ear normal. There is swelling and tenderness. No drainage. Tympanic membrane is bulging. Tympanic membrane is not perforated. A middle ear effusion (purulent) is present. Decreased hearing is noted.   Nose: Congestion present.   Eyes: Conjunctivae and EOM are normal.   Neck: Neck supple.   Pulmonary/Chest: Effort normal and breath sounds normal. No respiratory distress.   Abdominal: Normal appearance.   Musculoskeletal: Normal range of motion.         General: Normal range of motion.   Neurological: no focal deficit. He is alert. He displays no weakness. Gait normal.   Skin: Skin is warm, dry, not diaphoretic, not pale and no rash.   Psychiatric: His behavior is normal.         Assessment:       1. Non-recurrent acute suppurative otitis media of left ear without spontaneous rupture of tympanic membrane    2. Cough    3. History of COVID-19          Plan:        Patient may continue Ciprodex ear drops but also recommend oral antibiotic for inner ear infection.  Patient does have history of C diff requiring hospitalization last year.  He was advised to take antibiotics with food and also start probiotic.  If any side effects with antibiotics or if they begin to upset his stomach he was advised to contact us so we can change them.  May use Tessalon Perles as needed for cough.  Follow-up with PCP if no improvement.  Non-recurrent acute suppurative otitis media of left ear without spontaneous rupture of tympanic membrane  -     cefdinir (OMNICEF) 300 MG capsule; Take 1 capsule (300 mg total) by mouth 2 (two) times daily. for 10 days  Dispense: 20  capsule; Refill: 0    Cough  -     benzonatate (TESSALON PERLES) 100 MG capsule; Take 2 capsules (200 mg total) by mouth 3 (three) times daily as needed for Cough.  Dispense: 30 capsule; Refill: 0    History of COVID-19

## 2022-01-12 ENCOUNTER — PATIENT MESSAGE (OUTPATIENT)
Dept: OTOLARYNGOLOGY | Facility: CLINIC | Age: 67
End: 2022-01-12

## 2022-01-12 ENCOUNTER — CLINICAL SUPPORT (OUTPATIENT)
Dept: AUDIOLOGY | Facility: CLINIC | Age: 67
End: 2022-01-12
Payer: MEDICARE

## 2022-01-12 ENCOUNTER — OFFICE VISIT (OUTPATIENT)
Dept: OTOLARYNGOLOGY | Facility: CLINIC | Age: 67
End: 2022-01-12
Payer: MEDICARE

## 2022-01-12 VITALS — BODY MASS INDEX: 29.27 KG/M2 | HEIGHT: 66 IN | WEIGHT: 182.13 LBS

## 2022-01-12 DIAGNOSIS — H61.22 IMPACTED CERUMEN OF LEFT EAR: ICD-10-CM

## 2022-01-12 DIAGNOSIS — H93.12 TINNITUS OF LEFT EAR: ICD-10-CM

## 2022-01-12 DIAGNOSIS — H90.8 HEARING LOSS, MIXED, UNILATERAL: Primary | ICD-10-CM

## 2022-01-12 DIAGNOSIS — H90.A32 MIXED CONDUCTIVE AND SENSORINEURAL HEARING LOSS OF LEFT EAR WITH RESTRICTED HEARING OF RIGHT EAR: Primary | ICD-10-CM

## 2022-01-12 PROCEDURE — 3008F BODY MASS INDEX DOCD: CPT | Mod: CPTII,S$GLB,, | Performed by: PHYSICIAN ASSISTANT

## 2022-01-12 PROCEDURE — 92557 COMPREHENSIVE HEARING TEST: CPT | Mod: S$GLB,,, | Performed by: AUDIOLOGIST

## 2022-01-12 PROCEDURE — 1159F MED LIST DOCD IN RCRD: CPT | Mod: CPTII,S$GLB,, | Performed by: PHYSICIAN ASSISTANT

## 2022-01-12 PROCEDURE — 1126F PR PAIN SEVERITY QUANTIFIED, NO PAIN PRESENT: ICD-10-PCS | Mod: CPTII,S$GLB,, | Performed by: PHYSICIAN ASSISTANT

## 2022-01-12 PROCEDURE — 99204 PR OFFICE/OUTPT VISIT, NEW, LEVL IV, 45-59 MIN: ICD-10-PCS | Mod: S$GLB,,, | Performed by: PHYSICIAN ASSISTANT

## 2022-01-12 PROCEDURE — 99999 PR PBB SHADOW E&M-EST. PATIENT-LVL I: ICD-10-PCS | Mod: PBBFAC,,, | Performed by: AUDIOLOGIST

## 2022-01-12 PROCEDURE — 3008F PR BODY MASS INDEX (BMI) DOCUMENTED: ICD-10-PCS | Mod: CPTII,S$GLB,, | Performed by: PHYSICIAN ASSISTANT

## 2022-01-12 PROCEDURE — 99999 PR PBB SHADOW E&M-EST. PATIENT-LVL IV: ICD-10-PCS | Mod: PBBFAC,,, | Performed by: PHYSICIAN ASSISTANT

## 2022-01-12 PROCEDURE — 99999 PR PBB SHADOW E&M-EST. PATIENT-LVL IV: CPT | Mod: PBBFAC,,, | Performed by: PHYSICIAN ASSISTANT

## 2022-01-12 PROCEDURE — 1159F PR MEDICATION LIST DOCUMENTED IN MEDICAL RECORD: ICD-10-PCS | Mod: CPTII,S$GLB,, | Performed by: PHYSICIAN ASSISTANT

## 2022-01-12 PROCEDURE — 99999 PR PBB SHADOW E&M-EST. PATIENT-LVL I: CPT | Mod: PBBFAC,,, | Performed by: AUDIOLOGIST

## 2022-01-12 PROCEDURE — 92567 PR TYMPA2METRY: ICD-10-PCS | Mod: S$GLB,,, | Performed by: AUDIOLOGIST

## 2022-01-12 PROCEDURE — 1126F AMNT PAIN NOTED NONE PRSNT: CPT | Mod: CPTII,S$GLB,, | Performed by: PHYSICIAN ASSISTANT

## 2022-01-12 PROCEDURE — 92557 PR COMPREHENSIVE HEARING TEST: ICD-10-PCS | Mod: S$GLB,,, | Performed by: AUDIOLOGIST

## 2022-01-12 PROCEDURE — 92567 TYMPANOMETRY: CPT | Mod: S$GLB,,, | Performed by: AUDIOLOGIST

## 2022-01-12 PROCEDURE — 99204 OFFICE O/P NEW MOD 45 MIN: CPT | Mod: S$GLB,,, | Performed by: PHYSICIAN ASSISTANT

## 2022-01-12 RX ORDER — PREDNISONE 20 MG/1
TABLET ORAL
Qty: 21 TABLET | Refills: 0 | Status: SHIPPED | OUTPATIENT
Start: 2022-01-12 | End: 2022-04-18

## 2022-01-12 RX ORDER — FLUTICASONE PROPIONATE 50 MCG
2 SPRAY, SUSPENSION (ML) NASAL DAILY
Qty: 16 G | Refills: 3 | Status: SHIPPED | OUTPATIENT
Start: 2022-01-12 | End: 2022-01-18

## 2022-01-12 NOTE — PROGRESS NOTES
Raymundo Restrepo was seen 01/12/2022 for an audiological evaluation.  Patient complains of left ear pain, pressure, constant roaring and decreased hearing since onset of Covid 12/23/21. He was treated for symptoms last week and reports slight improvement in hearing. He will finish meds in 2 days. He denies previous tinnitus or any dizziness. He does report a history of noise exposure and he suspects that his left ear has always been the slightly poorer hearing ear.     Results reveal a mild-to-moderate sensorineural hearing loss 6868-2695 Hz for the right ear, and  mild-to-severe mixed hearing loss 250-8000 Hz for the left ear.   Speech Reception Thresholds were  20 dBHL for the right ear and 45 dBHL for the left ear.   Word recognition scores were excellent for the right ear and excellent for the left ear.   Tympanograms were Type A, normal for the right ear and Type B, shallow, flat for the left ear.    Patient was counseled on the above findings.    Recommendations include:    1.  ENT followup  2.  Recheck per ENT  3.  Wear hearing protective devices around loud noise  4.  Annual audiograms

## 2022-01-12 NOTE — PROGRESS NOTES
Answers for HPI/ROS submitted by the patient on 1/11/2022  Fatigue (Tiredness)?: Yes  fever: Yes  chills: Yes  hearing loss: Yes  Ear infection(s)?: Yes  ear pain: Yes  sinus pressure : Yes  Sinus infection(s)?: Yes  postnasal drip: Yes  sore throat: Yes  None of these : Yes  shortness of breath: Yes  wheezing: Yes  cough: Yes  None of these : Yes  diarrhea: Yes  Vomiting?: Yes  heartburn: Yes  None of these: Yes  Muscle aches / pain?: Yes  neck pain: Yes  None of these : Yes  Food Allergies?: Yes  Cold all of the time? : Yes  headaches: Yes  tremors: Yes  Light-headedness: Yes  Bruises or bleeds easily: Yes  sleep disturbance: Yes

## 2022-01-12 NOTE — PROGRESS NOTES
"Subjective:       Patient ID: Raymundo Restrepo is a 66 y.o. male.    Chief Complaint: Hearing Loss    Patient is a very pleasant 66 year old gentleman here to see me today for the first time for evaluation of hearing loss in his LEFT ear over past 2-3 weeks.  He is a renal transplant patient and is on immunosuppressants.   He reports testing positive for covid on 12/23/2021; tested negative on 12/30/2021. Recently flew back from [place] and states left ear pain was excruciating during flight's descent 2 weeks ago.  He noted hearing loss AS at that time.  He went to  on 1/4/22 with left ear pain/clogged, cough, congestion, headache, and hearing loss.  He was started on Omnicef x 10 days (currently taking it).  He has noted improvement in the otalgia and says last night he was able to hear slightly in the left ear.  He feels fluid "sloshing" around with certain head movements.  He did use old Ciprodex drops for few days.  Denies any ear drainage.  He has noted tinnitus AS and feels off-balance and unsteady.  Denies any fever.  He denies previous otologic surgery.  He has hx of loud noise exposure.  He does not smoke.  Not using any nasal sprays.  He works as [employer] and is anxious to return to work in 3 weeks with normal hearing and normal balance.       Review of Systems   Constitutional: Positive for chills and fever.   HENT: Positive for ear pain, hearing loss, postnasal drip, sinus pressure/congestion and sore throat. Negative for ear discharge.    Eyes: Negative.    Respiratory: Positive for cough, shortness of breath and wheezing.    Cardiovascular: Negative.    Gastrointestinal: Positive for diarrhea and vomiting.   Endocrine: Positive for cold intolerance.   Genitourinary: Negative.    Musculoskeletal: Positive for neck pain.   Integumentary:  Negative.   Allergic/Immunologic: Positive for food allergies.   Neurological: Positive for dizziness, tremors, light-headedness and headaches.   Hematological: " Bruises/bleeds easily.   Psychiatric/Behavioral: Positive for sleep disturbance.         Objective:      Physical Exam  Vitals reviewed.   Constitutional:       General: He is not in acute distress.Vital signs are normal.      Appearance: He is well-developed and well-nourished.   HENT:      Head: Normocephalic and atraumatic.      Jaw: No trismus.      Right Ear: Tympanic membrane, ear canal and external ear normal. Decreased hearing noted. No middle ear effusion. Tympanic membrane is not erythematous.      Left Ear: Ear canal and external ear normal. Decreased hearing noted. Tenderness present. No drainage or swelling. A middle ear effusion (unable to fully visualize TM due to wax) is present. There is impacted cerumen (adjacent to TM (did not tolerate attempts at removal today)). Tympanic membrane is erythematous.      Nose: No nasal deformity, mucosal edema or rhinorrhea.      Mouth/Throat:      Mouth: Oropharynx is clear and moist and mucous membranes are normal. Mucous membranes are moist.      Dentition: Normal dentition.      Pharynx: Uvula midline. No oropharyngeal exudate, posterior oropharyngeal edema or uvula swelling.   Eyes:      General: No scleral icterus.     Extraocular Movements: EOM normal.      Conjunctiva/sclera: Conjunctivae normal.      Right eye: Right conjunctiva is not injected. No chemosis.     Left eye: Left conjunctiva is not injected. No chemosis.     Pupils: Pupils are equal, round, and reactive to light.   Neck:      Trachea: Trachea and phonation normal. No tracheal tenderness or tracheal deviation.   Cardiovascular:      Pulses: Intact distal pulses.   Pulmonary:      Effort: Pulmonary effort is normal. No accessory muscle usage or respiratory distress.      Breath sounds: No stridor.   Lymphadenopathy:      Head:      Right side of head: No submental, submandibular, preauricular or posterior auricular adenopathy.      Left side of head: No submental, submandibular, preauricular or  posterior auricular adenopathy.      Cervical: No cervical adenopathy.      Right cervical: No superficial or deep cervical adenopathy.     Left cervical: No superficial or deep cervical adenopathy.   Skin:     General: Skin is warm and dry.      Findings: No erythema or rash.   Neurological:      Mental Status: He is alert and oriented to person, place, and time.      Cranial Nerves: No cranial nerve deficit.   Psychiatric:         Mood and Affect: Mood and affect normal.         Behavior: Behavior normal. Behavior is cooperative.         Thought Content: Thought content normal.           AUDIOGRAM:          Assessment:       Problem List Items Addressed This Visit    None     Visit Diagnoses     Mixed conductive and sensorineural hearing loss of left ear with restricted hearing of right ear    -  Primary    Tinnitus of left ear        Impacted cerumen of left ear              Plan:         We discussed that he has significant cerumen in his left ear limiting full visualization of TM.  He likely has a unilateral middle ear effusion in the the left ear given his mixed hearing loss on testing today and otalgia & hearing loss during recent flight.  I would recommend treatment with an oral antibiotic as well as a steroid taper.  The patient has already been started on a prescription for cefdinir for 10 days.  I recommend he complete as prescribed.  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  I would recommend adding a prednisone taper, 60-40-20-10.  Will check with his transplant team first and will send in to his pharmacy once we get their approval.  I will schedule him to RTC with MD in 3 weeks for recheck and repeat left ear cleaning.  We discussed that if the fluid does not clear, then I would recommend consideration of tube placement in that ear.  We discussed that tube placement could be done either in clinic or in the  OR, and that most adults tolerate tube placement in clinic without difficulty.  He is not sure if he wishes to proceed with tube placement.  Recommend Flonase daily as well.   The patient was given a prescription for a steroid nasal spray, and we discussed in detail the proper mechanism of use directing the spray away from the nasal septum.  In addition, we also discussed that it will take two to three weeks of daily use to achieve maximal effectiveness.   He did not tolerate attempts at cerumen removal AS today.  I would recommend the use of a wax softening drop, either over the counter Debrox or mineral oil, on a nightly basis for the next week.  I also instructed the patient to avoid Qtips.    ADDENDUM:  Messaged pt's transplant team today regarding use of Prednisone taper and Flonase.  I heard back from Lesley Becker NP and she said OK to use Prednisone 60-40-20-10 taper and Flonase.  This message was sent to patient via portal as well.    Answers for HPI/ROS submitted by the patient on 1/11/2022  Fatigue (Tiredness)?: Yes  Ear infection(s)?: Yes  Sinus infection(s)?: Yes  heartburn: Yes  Muscle aches / pain?: Yes

## 2022-01-12 NOTE — PROGRESS NOTES
Subjective:       Patient ID: Raymundo Restrepo is a 66 y.o. male.      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  Has an appt set up with Dr. Izaguirre next month.  Insurance does not require him to have a referral but he was told to get one anyway from [facility].  Having hearing issues more so on left side, gradually improving.    He will be est care with Dr. Segura in near future.  Relocated from [place] to [place].  Is an established pt of [facility] in [facility] --- transplant patient.    Followed by:  1.  Nephrology --- Dr. Jean Crump ([place])  2.  Transplant --- Renal 8/2010, [facility] [place] transplant team.  3.  Cardiology --- ???        Review of Systems   Constitutional: Negative.    HENT: Negative.    Eyes: Negative for photophobia and visual disturbance.   Respiratory: Negative.    Cardiovascular: Negative.    Gastrointestinal: Positive for abdominal pain and diarrhea.        Recurrent C-diff colitis, last episode May 2021   Endocrine: Negative for polydipsia, polyphagia and polyuria.   Genitourinary: Negative.    Musculoskeletal: Negative.    Skin: Negative for rash and wound.   Neurological: Negative.            Review of patient's allergies indicates:   Allergen Reactions    Iodine Anaphylaxis    Morphine Nausea And Vomiting         Patient Active Problem List   Diagnosis    S/p renal transplant    Polycystic kidney disease    Chronic immunosuppression with Prograf and Cellcept    Ischemic colitis    Recurrent skin cancer    CAD s/p PTCA and Stent x 1(4/07 and 10/2010)    Stage 3 chronic kidney disease    Hypertension    Melanoma    Dysphagia    Colitis with positive occult blood stool    Hypomagnesemia    Clostridium difficile colitis    Immunocompromised    Diarrhea of infectious origin    Recurrent Clostridium difficile diarrhea           Current Outpatient Medications:     acetaminophen (TYLENOL) 500 MG tablet,  Take 1 tablet (500 mg total) by mouth every 6 (six) hours as needed for Pain., Disp: , Rfl:     allopurinoL (ZYLOPRIM) 100 MG tablet, Take 3 tablets (300 mg total) by mouth once daily., Disp: , Rfl:     amLODIPine (NORVASC) 5 MG tablet, Take 1 tablet (5 mg total) by mouth daily., Disp: 30 tablet, Rfl: 11    benazepriL (LOTENSIN) 5 MG tablet, Take 5 mg by mouth., Disp: , Rfl:     carvedilol (COREG) 12.5 MG tablet, Take 12.5 mg by mouth 2 (two) times daily. , Disp: , Rfl:     clopidogreL (PLAVIX) 75 mg tablet, Take 1 tablet (75 mg total) by mouth once daily., Disp: , Rfl:     fluorouraciL (EFUDEX) 5 % cream, AAA on face BID x 4-6 weeks. Stop if blistered, oozing, or bleeding. Use daily sun protection., Disp: 40 g, Rfl: 1    multivitamin (THERAGRAN) per tablet, Take 1 tablet by mouth Daily., Disp: , Rfl:     mycophenolate (CELLCEPT) 250 mg Cap, Take 1 capsule (250 mg total) by mouth 2 (two) times daily., Disp: 60 capsule, Rfl: 11    nitroGLYCERIN (NITROSTAT) 0.4 MG SL tablet, Place 0.4 mg under the tongue., Disp: , Rfl:     predniSONE (DELTASONE) 20 MG tablet, Take 3 tab in am x 3d, then 2 tab in am x 3d, then 1 tab in am x 3d, then 1/2 tab in am x 3d, Disp: 21 tablet, Rfl: 0    sodium bicarbonate 650 MG tablet, Take 650 mg by mouth 2 (two) times daily., Disp: , Rfl:     sodium polystyrene (KAYEXALATE) powder, take 15(grams) and mix with 60mls of water and drink as 1 dose>, Disp: 15 g, Rfl: 0    tacrolimus (PROGRAF) 0.5 MG Cap, Take 0.5 mg by mouth every evening., Disp: , Rfl:     tacrolimus (PROGRAF) 1 MG Cap, Take 1 mg by mouth every morning., Disp: , Rfl:   No current facility-administered medications for this visit.    Medications have been reviewed and reconciled.        Past medical, surgical, family and social histories have been reviewed today.      Objective:     Vitals:    01/18/22 1445   BP: 120/73   Pulse: 88   Temp: 98 °F (36.7 °C)         Physical Exam  Constitutional:       General: He is  not in acute distress.  HENT:      Head: Normocephalic and atraumatic.   Cardiovascular:      Rate and Rhythm: Normal rate and regular rhythm.   Pulmonary:      Effort: Pulmonary effort is normal.      Breath sounds: Normal breath sounds.   Skin:     Capillary Refill: Capillary refill takes less than 2 seconds.   Neurological:      Mental Status: He is alert and oriented to person, place, and time.   Psychiatric:         Mood and Affect: Mood normal.         Behavior: Behavior normal.         Thought Content: Thought content normal.         Judgment: Judgment normal.           Assessment       ICD-10-CM ICD-9-CM    1. Primary hypertension  I10 401.9 Controlled on medication as ordered.  Followed by Cardiology.   2. Coronary artery disease involving native coronary artery of native heart without angina pectoris  I25.10 414.01 Stable, h/o stenting.  On Plavix as ordered, per Cardiology.   3. Mixed conductive and sensorineural hearing loss of left ear with restricted hearing of right ear  H90.A32 389.22 Ambulatory referral/consult to ENT    Has appt booked with Dr. Izaguirre for 2/2/2022.   4. Polycystic kidney disease  Q61.3 753.12 [place], h/o kidney transplant.  Followed by Nephrology and [facility] transplant team.   5. S/p renal transplant  Z94.0 V42.0 8/2010   6. Chronic immunosuppression with Prograf and Cellcept  Z29.8 V07.2 History of renal transplant   7. Recurrent Clostridium difficile diarrhea  A04.71 008.45 Stable for now, recurrent issue.  Last episode May 2021, prev followed by GI.        Follow-up     Establish care with PCP/[facility] in 3 months.  Return to clinic as needed.      CHRISTIANA Otto  [facility]

## 2022-01-18 ENCOUNTER — OFFICE VISIT (OUTPATIENT)
Dept: FAMILY MEDICINE | Facility: CLINIC | Age: 67
End: 2022-01-18
Payer: MEDICARE

## 2022-01-18 VITALS
DIASTOLIC BLOOD PRESSURE: 73 MMHG | SYSTOLIC BLOOD PRESSURE: 120 MMHG | HEART RATE: 88 BPM | OXYGEN SATURATION: 100 % | WEIGHT: 182.31 LBS | BODY MASS INDEX: 29.3 KG/M2 | HEIGHT: 66 IN | TEMPERATURE: 98 F

## 2022-01-18 DIAGNOSIS — A04.71 RECURRENT CLOSTRIDIUM DIFFICILE DIARRHEA: ICD-10-CM

## 2022-01-18 DIAGNOSIS — Q61.3 POLYCYSTIC KIDNEY DISEASE: ICD-10-CM

## 2022-01-18 DIAGNOSIS — Z29.89 NEED FOR PROPHYLACTIC IMMUNOTHERAPY: Chronic | ICD-10-CM

## 2022-01-18 DIAGNOSIS — I25.10 CORONARY ARTERY DISEASE INVOLVING NATIVE CORONARY ARTERY OF NATIVE HEART WITHOUT ANGINA PECTORIS: Chronic | ICD-10-CM

## 2022-01-18 DIAGNOSIS — I10 PRIMARY HYPERTENSION: Primary | Chronic | ICD-10-CM

## 2022-01-18 DIAGNOSIS — H90.A32 MIXED CONDUCTIVE AND SENSORINEURAL HEARING LOSS OF LEFT EAR WITH RESTRICTED HEARING OF RIGHT EAR: ICD-10-CM

## 2022-01-18 DIAGNOSIS — Z94.0 S/P CADAVER RENAL TRANSPLANT: ICD-10-CM

## 2022-01-18 PROBLEM — K52.9 COLITIS: Status: RESOLVED | Noted: 2020-12-27 | Resolved: 2022-01-18

## 2022-01-18 PROBLEM — R13.10 DYSPHAGIA: Status: RESOLVED | Noted: 2019-02-01 | Resolved: 2022-01-18

## 2022-01-18 PROCEDURE — 1101F PT FALLS ASSESS-DOCD LE1/YR: CPT | Mod: CPTII,S$GLB,, | Performed by: REGISTERED NURSE

## 2022-01-18 PROCEDURE — 3008F BODY MASS INDEX DOCD: CPT | Mod: CPTII,S$GLB,, | Performed by: REGISTERED NURSE

## 2022-01-18 PROCEDURE — 3078F DIAST BP <80 MM HG: CPT | Mod: CPTII,S$GLB,, | Performed by: REGISTERED NURSE

## 2022-01-18 PROCEDURE — 3288F FALL RISK ASSESSMENT DOCD: CPT | Mod: CPTII,S$GLB,, | Performed by: REGISTERED NURSE

## 2022-01-18 PROCEDURE — 1126F AMNT PAIN NOTED NONE PRSNT: CPT | Mod: CPTII,S$GLB,, | Performed by: REGISTERED NURSE

## 2022-01-18 PROCEDURE — 99999 PR PBB SHADOW E&M-EST. PATIENT-LVL V: CPT | Mod: PBBFAC,,, | Performed by: REGISTERED NURSE

## 2022-01-18 PROCEDURE — 3074F PR MOST RECENT SYSTOLIC BLOOD PRESSURE < 130 MM HG: ICD-10-PCS | Mod: CPTII,S$GLB,, | Performed by: REGISTERED NURSE

## 2022-01-18 PROCEDURE — 1159F PR MEDICATION LIST DOCUMENTED IN MEDICAL RECORD: ICD-10-PCS | Mod: CPTII,S$GLB,, | Performed by: REGISTERED NURSE

## 2022-01-18 PROCEDURE — 1159F MED LIST DOCD IN RCRD: CPT | Mod: CPTII,S$GLB,, | Performed by: REGISTERED NURSE

## 2022-01-18 PROCEDURE — 99214 OFFICE O/P EST MOD 30 MIN: CPT | Mod: S$GLB,,, | Performed by: REGISTERED NURSE

## 2022-01-18 PROCEDURE — 1126F PR PAIN SEVERITY QUANTIFIED, NO PAIN PRESENT: ICD-10-PCS | Mod: CPTII,S$GLB,, | Performed by: REGISTERED NURSE

## 2022-01-18 PROCEDURE — 3074F SYST BP LT 130 MM HG: CPT | Mod: CPTII,S$GLB,, | Performed by: REGISTERED NURSE

## 2022-01-18 PROCEDURE — 3288F PR FALLS RISK ASSESSMENT DOCUMENTED: ICD-10-PCS | Mod: CPTII,S$GLB,, | Performed by: REGISTERED NURSE

## 2022-01-18 PROCEDURE — 1101F PR PT FALLS ASSESS DOC 0-1 FALLS W/OUT INJ PAST YR: ICD-10-PCS | Mod: CPTII,S$GLB,, | Performed by: REGISTERED NURSE

## 2022-01-18 PROCEDURE — 3078F PR MOST RECENT DIASTOLIC BLOOD PRESSURE < 80 MM HG: ICD-10-PCS | Mod: CPTII,S$GLB,, | Performed by: REGISTERED NURSE

## 2022-01-18 PROCEDURE — 4010F ACE/ARB THERAPY RXD/TAKEN: CPT | Mod: CPTII,S$GLB,, | Performed by: REGISTERED NURSE

## 2022-01-18 PROCEDURE — 99214 PR OFFICE/OUTPT VISIT, EST, LEVL IV, 30-39 MIN: ICD-10-PCS | Mod: S$GLB,,, | Performed by: REGISTERED NURSE

## 2022-01-18 PROCEDURE — 99999 PR PBB SHADOW E&M-EST. PATIENT-LVL V: ICD-10-PCS | Mod: PBBFAC,,, | Performed by: REGISTERED NURSE

## 2022-01-18 PROCEDURE — 4010F PR ACE/ARB THEARPY RXD/TAKEN: ICD-10-PCS | Mod: CPTII,S$GLB,, | Performed by: REGISTERED NURSE

## 2022-01-18 PROCEDURE — 3008F PR BODY MASS INDEX (BMI) DOCUMENTED: ICD-10-PCS | Mod: CPTII,S$GLB,, | Performed by: REGISTERED NURSE

## 2022-01-18 RX ORDER — BENAZEPRIL HYDROCHLORIDE 5 MG/1
5 TABLET ORAL
COMMUNITY
Start: 2021-10-06 | End: 2022-10-06

## 2022-02-02 ENCOUNTER — OFFICE VISIT (OUTPATIENT)
Dept: OTOLARYNGOLOGY | Facility: CLINIC | Age: 67
End: 2022-02-02
Payer: MEDICARE

## 2022-02-02 VITALS — BODY MASS INDEX: 28.88 KG/M2 | HEIGHT: 66 IN | WEIGHT: 179.69 LBS

## 2022-02-02 DIAGNOSIS — H69.93 ETD (EUSTACHIAN TUBE DYSFUNCTION), BILATERAL: ICD-10-CM

## 2022-02-02 DIAGNOSIS — H90.3 SENSORINEURAL HEARING LOSS (SNHL) OF BOTH EARS: ICD-10-CM

## 2022-02-02 DIAGNOSIS — H61.22 IMPACTED CERUMEN OF LEFT EAR: Primary | ICD-10-CM

## 2022-02-02 PROCEDURE — 1101F PR PT FALLS ASSESS DOC 0-1 FALLS W/OUT INJ PAST YR: ICD-10-PCS | Mod: CPTII,S$GLB,, | Performed by: ORTHOPAEDIC SURGERY

## 2022-02-02 PROCEDURE — 99999 PR PBB SHADOW E&M-EST. PATIENT-LVL III: CPT | Mod: PBBFAC,,, | Performed by: ORTHOPAEDIC SURGERY

## 2022-02-02 PROCEDURE — 1126F PR PAIN SEVERITY QUANTIFIED, NO PAIN PRESENT: ICD-10-PCS | Mod: CPTII,S$GLB,, | Performed by: ORTHOPAEDIC SURGERY

## 2022-02-02 PROCEDURE — 1159F MED LIST DOCD IN RCRD: CPT | Mod: CPTII,S$GLB,, | Performed by: ORTHOPAEDIC SURGERY

## 2022-02-02 PROCEDURE — 99213 PR OFFICE/OUTPT VISIT, EST, LEVL III, 20-29 MIN: ICD-10-PCS | Mod: 25,S$GLB,, | Performed by: ORTHOPAEDIC SURGERY

## 2022-02-02 PROCEDURE — 99999 PR PBB SHADOW E&M-EST. PATIENT-LVL III: ICD-10-PCS | Mod: PBBFAC,,, | Performed by: ORTHOPAEDIC SURGERY

## 2022-02-02 PROCEDURE — 1101F PT FALLS ASSESS-DOCD LE1/YR: CPT | Mod: CPTII,S$GLB,, | Performed by: ORTHOPAEDIC SURGERY

## 2022-02-02 PROCEDURE — 4010F PR ACE/ARB THEARPY RXD/TAKEN: ICD-10-PCS | Mod: CPTII,S$GLB,, | Performed by: ORTHOPAEDIC SURGERY

## 2022-02-02 PROCEDURE — 99213 OFFICE O/P EST LOW 20 MIN: CPT | Mod: 25,S$GLB,, | Performed by: ORTHOPAEDIC SURGERY

## 2022-02-02 PROCEDURE — 3288F FALL RISK ASSESSMENT DOCD: CPT | Mod: CPTII,S$GLB,, | Performed by: ORTHOPAEDIC SURGERY

## 2022-02-02 PROCEDURE — 1126F AMNT PAIN NOTED NONE PRSNT: CPT | Mod: CPTII,S$GLB,, | Performed by: ORTHOPAEDIC SURGERY

## 2022-02-02 PROCEDURE — 69210 REMOVE IMPACTED EAR WAX UNI: CPT | Mod: S$GLB,,, | Performed by: ORTHOPAEDIC SURGERY

## 2022-02-02 PROCEDURE — 3288F PR FALLS RISK ASSESSMENT DOCUMENTED: ICD-10-PCS | Mod: CPTII,S$GLB,, | Performed by: ORTHOPAEDIC SURGERY

## 2022-02-02 PROCEDURE — 69210 PR REMOVAL IMPACTED CERUMEN REQUIRING INSTRUMENTATION, UNILATERAL: ICD-10-PCS | Mod: S$GLB,,, | Performed by: ORTHOPAEDIC SURGERY

## 2022-02-02 PROCEDURE — 3008F BODY MASS INDEX DOCD: CPT | Mod: CPTII,S$GLB,, | Performed by: ORTHOPAEDIC SURGERY

## 2022-02-02 PROCEDURE — 4010F ACE/ARB THERAPY RXD/TAKEN: CPT | Mod: CPTII,S$GLB,, | Performed by: ORTHOPAEDIC SURGERY

## 2022-02-02 PROCEDURE — 3008F PR BODY MASS INDEX (BMI) DOCUMENTED: ICD-10-PCS | Mod: CPTII,S$GLB,, | Performed by: ORTHOPAEDIC SURGERY

## 2022-02-02 PROCEDURE — 1159F PR MEDICATION LIST DOCUMENTED IN MEDICAL RECORD: ICD-10-PCS | Mod: CPTII,S$GLB,, | Performed by: ORTHOPAEDIC SURGERY

## 2022-02-02 NOTE — PROGRESS NOTES
Subjective:      Patient ID: Raymundo Restrepo is a 67 y.o. male.    Chief Complaint: Follow-up (Pt states the infection has cleared )    Patient is a 67 year old gentleman seen today for evaluation of his ears.  He has recently had a positive COVID diagnosis while out of town, and during his return flight he had significant pain in his left ear.  He was seen in ENT Clinic and was found to have a purulent effusion in the left ear.  He was then started on antibiotics, steroids as well as Flonase.  He says that his pain and discomfort have now completely resolved.  He also feels that his hearing is back to his baseline.  He does think that the Flonase has been extremely helpful in controlling his nasal congestion.        Review of Systems   Constitutional: Positive for chills and fever.   HENT: Negative.  Negative for ear discharge, ear pain and hearing loss.    Eyes: Negative.    Respiratory: Positive for cough.    Cardiovascular: Negative.    Gastrointestinal: Positive for abdominal pain and diarrhea.   Endocrine: Positive for cold intolerance.   Genitourinary: Negative.    Musculoskeletal: Negative.    Skin: Negative.    Allergic/Immunologic: Positive for food allergies.   Neurological: Positive for headaches.   Hematological: Bruises/bleeds easily.   Psychiatric/Behavioral: Positive for sleep disturbance.       Objective:       Physical Exam  Constitutional:       General: He is not in acute distress.Vital signs are normal.      Appearance: He is well-developed and well-nourished.   HENT:      Head: Normocephalic and atraumatic.      Jaw: No trismus.      Right Ear: Hearing, ear canal and external ear normal. No middle ear effusion.      Left Ear: Hearing, tympanic membrane, ear canal and external ear normal.  No middle ear effusion. There is impacted cerumen.      Nose: Nose normal. No nasal deformity, septal deviation, mucosal edema or rhinorrhea.      Mouth/Throat:      Mouth: Oropharynx is clear and moist and  mucous membranes are normal.      Dentition: Normal dentition.      Pharynx: Uvula midline. No oropharyngeal exudate, posterior oropharyngeal edema or uvula swelling.   Eyes:      General: No scleral icterus.     Extraocular Movements: EOM normal.      Conjunctiva/sclera: Conjunctivae normal.      Right eye: Right conjunctiva is not injected. No chemosis.     Left eye: Left conjunctiva is not injected. No chemosis.     Pupils: Pupils are equal, round, and reactive to light.   Neck:      Thyroid: No thyroid mass or thyromegaly.      Trachea: Trachea and phonation normal. No tracheal tenderness or tracheal deviation.   Cardiovascular:      Pulses: Intact distal pulses.   Pulmonary:      Effort: Pulmonary effort is normal. No accessory muscle usage or respiratory distress.      Breath sounds: No stridor.   Lymphadenopathy:      Head:      Right side of head: No submental, submandibular, preauricular or posterior auricular adenopathy.      Left side of head: No submental, submandibular, preauricular or posterior auricular adenopathy.      Cervical: No cervical adenopathy.      Right cervical: No superficial or deep cervical adenopathy.     Left cervical: No superficial or deep cervical adenopathy.   Skin:     General: Skin is warm and dry.      Findings: No erythema or rash.   Neurological:      Mental Status: He is alert and oriented to person, place, and time.      Cranial Nerves: No cranial nerve deficit.   Psychiatric:         Mood and Affect: Mood and affect normal.         Behavior: Behavior normal.         Thought Content: Thought content normal.     Procedure Note    CHIEF COMPLAINT:  Cerumen Impaction    Description:  The patient was seated in an exam chair.  An ear speculum was placed in the left EAC and was examined under the microscope.  Suction and/or loop curettes were used to remove a large cerumen impaction.  The tympanic membrane was visualized and was normal in appearance.  The patient tolerated the  procedure well.        Assessment:       1. Impacted cerumen of left ear    2. Sensorineural hearing loss (SNHL) of both ears    3. ETD (Eustachian tube dysfunction), bilateral        Plan:     Impacted cerumen of left ear:  Removed today.  Continue with debrox weekly.    Sensorineural hearing loss (SNHL) of both ears:  Fluid resolved, annual audiograms.  Consider hearing aids when ready.    ETD (Eustachian tube dysfunction), bilateral:  Happy with flonase, continue with medication.

## 2022-04-04 ENCOUNTER — TELEPHONE (OUTPATIENT)
Dept: ADMINISTRATIVE | Facility: HOSPITAL | Age: 67
End: 2022-04-04
Payer: MEDICARE

## 2022-04-18 ENCOUNTER — LAB VISIT (OUTPATIENT)
Dept: LAB | Facility: HOSPITAL | Age: 67
End: 2022-04-18
Payer: MEDICARE

## 2022-04-18 ENCOUNTER — TELEPHONE (OUTPATIENT)
Dept: DERMATOLOGY | Facility: CLINIC | Age: 67
End: 2022-04-18
Payer: MEDICARE

## 2022-04-18 ENCOUNTER — OFFICE VISIT (OUTPATIENT)
Dept: FAMILY MEDICINE | Facility: CLINIC | Age: 67
End: 2022-04-18
Payer: MEDICARE

## 2022-04-18 VITALS
HEART RATE: 78 BPM | HEIGHT: 66 IN | DIASTOLIC BLOOD PRESSURE: 60 MMHG | OXYGEN SATURATION: 98 % | BODY MASS INDEX: 29.11 KG/M2 | WEIGHT: 181.13 LBS | SYSTOLIC BLOOD PRESSURE: 130 MMHG | TEMPERATURE: 98 F

## 2022-04-18 DIAGNOSIS — Z23 NEED FOR VACCINATION AGAINST STREPTOCOCCUS PNEUMONIAE: Primary | ICD-10-CM

## 2022-04-18 DIAGNOSIS — J06.9 UPPER RESPIRATORY TRACT INFECTION, UNSPECIFIED TYPE: ICD-10-CM

## 2022-04-18 DIAGNOSIS — Z76.89 ENCOUNTER TO ESTABLISH CARE: Primary | ICD-10-CM

## 2022-04-18 DIAGNOSIS — Z94.0 S/P CADAVER RENAL TRANSPLANT: ICD-10-CM

## 2022-04-18 DIAGNOSIS — Z86.73 HISTORY OF TIA (TRANSIENT ISCHEMIC ATTACK): ICD-10-CM

## 2022-04-18 DIAGNOSIS — R73.9 HYPERGLYCEMIA: ICD-10-CM

## 2022-04-18 DIAGNOSIS — D84.9 IMMUNODEFICIENCY, UNSPECIFIED: ICD-10-CM

## 2022-04-18 DIAGNOSIS — C43.9 MALIGNANT MELANOMA, UNSPECIFIED SITE: ICD-10-CM

## 2022-04-18 DIAGNOSIS — I25.10 CORONARY ARTERY DISEASE INVOLVING NATIVE CORONARY ARTERY OF NATIVE HEART WITHOUT ANGINA PECTORIS: ICD-10-CM

## 2022-04-18 DIAGNOSIS — Q61.3 POLYCYSTIC KIDNEY DISEASE: ICD-10-CM

## 2022-04-18 DIAGNOSIS — K55.9 ISCHEMIC COLITIS: ICD-10-CM

## 2022-04-18 DIAGNOSIS — I10 PRIMARY HYPERTENSION: ICD-10-CM

## 2022-04-18 LAB
ESTIMATED AVG GLUCOSE: 105 MG/DL (ref 68–131)
HBA1C MFR BLD: 5.3 % (ref 4–5.6)

## 2022-04-18 PROCEDURE — 3008F BODY MASS INDEX DOCD: CPT | Mod: CPTII,S$GLB,, | Performed by: FAMILY MEDICINE

## 2022-04-18 PROCEDURE — 1101F PT FALLS ASSESS-DOCD LE1/YR: CPT | Mod: CPTII,S$GLB,, | Performed by: FAMILY MEDICINE

## 2022-04-18 PROCEDURE — 99214 PR OFFICE/OUTPT VISIT, EST, LEVL IV, 30-39 MIN: ICD-10-PCS | Mod: S$GLB,,, | Performed by: FAMILY MEDICINE

## 2022-04-18 PROCEDURE — 1160F RVW MEDS BY RX/DR IN RCRD: CPT | Mod: CPTII,S$GLB,, | Performed by: FAMILY MEDICINE

## 2022-04-18 PROCEDURE — 83036 HEMOGLOBIN GLYCOSYLATED A1C: CPT | Performed by: FAMILY MEDICINE

## 2022-04-18 PROCEDURE — 3288F FALL RISK ASSESSMENT DOCD: CPT | Mod: CPTII,S$GLB,, | Performed by: FAMILY MEDICINE

## 2022-04-18 PROCEDURE — 1159F PR MEDICATION LIST DOCUMENTED IN MEDICAL RECORD: ICD-10-PCS | Mod: CPTII,S$GLB,, | Performed by: FAMILY MEDICINE

## 2022-04-18 PROCEDURE — 36415 COLL VENOUS BLD VENIPUNCTURE: CPT | Mod: PO | Performed by: FAMILY MEDICINE

## 2022-04-18 PROCEDURE — 1101F PR PT FALLS ASSESS DOC 0-1 FALLS W/OUT INJ PAST YR: ICD-10-PCS | Mod: CPTII,S$GLB,, | Performed by: FAMILY MEDICINE

## 2022-04-18 PROCEDURE — 99499 UNLISTED E&M SERVICE: CPT | Mod: S$GLB,,, | Performed by: FAMILY MEDICINE

## 2022-04-18 PROCEDURE — 1159F MED LIST DOCD IN RCRD: CPT | Mod: CPTII,S$GLB,, | Performed by: FAMILY MEDICINE

## 2022-04-18 PROCEDURE — 3075F SYST BP GE 130 - 139MM HG: CPT | Mod: CPTII,S$GLB,, | Performed by: FAMILY MEDICINE

## 2022-04-18 PROCEDURE — 3075F PR MOST RECENT SYSTOLIC BLOOD PRESS GE 130-139MM HG: ICD-10-PCS | Mod: CPTII,S$GLB,, | Performed by: FAMILY MEDICINE

## 2022-04-18 PROCEDURE — 99999 PR PBB SHADOW E&M-EST. PATIENT-LVL III: CPT | Mod: PBBFAC,,, | Performed by: FAMILY MEDICINE

## 2022-04-18 PROCEDURE — 4010F PR ACE/ARB THEARPY RXD/TAKEN: ICD-10-PCS | Mod: CPTII,S$GLB,, | Performed by: FAMILY MEDICINE

## 2022-04-18 PROCEDURE — 3008F PR BODY MASS INDEX (BMI) DOCUMENTED: ICD-10-PCS | Mod: CPTII,S$GLB,, | Performed by: FAMILY MEDICINE

## 2022-04-18 PROCEDURE — 3288F PR FALLS RISK ASSESSMENT DOCUMENTED: ICD-10-PCS | Mod: CPTII,S$GLB,, | Performed by: FAMILY MEDICINE

## 2022-04-18 PROCEDURE — 4010F ACE/ARB THERAPY RXD/TAKEN: CPT | Mod: CPTII,S$GLB,, | Performed by: FAMILY MEDICINE

## 2022-04-18 PROCEDURE — 3078F DIAST BP <80 MM HG: CPT | Mod: CPTII,S$GLB,, | Performed by: FAMILY MEDICINE

## 2022-04-18 PROCEDURE — 1160F PR REVIEW ALL MEDS BY PRESCRIBER/CLIN PHARMACIST DOCUMENTED: ICD-10-PCS | Mod: CPTII,S$GLB,, | Performed by: FAMILY MEDICINE

## 2022-04-18 PROCEDURE — 99999 PR PBB SHADOW E&M-EST. PATIENT-LVL III: ICD-10-PCS | Mod: PBBFAC,,, | Performed by: FAMILY MEDICINE

## 2022-04-18 PROCEDURE — 3078F PR MOST RECENT DIASTOLIC BLOOD PRESSURE < 80 MM HG: ICD-10-PCS | Mod: CPTII,S$GLB,, | Performed by: FAMILY MEDICINE

## 2022-04-18 PROCEDURE — 99214 OFFICE O/P EST MOD 30 MIN: CPT | Mod: S$GLB,,, | Performed by: FAMILY MEDICINE

## 2022-04-18 PROCEDURE — 99499 RISK ADDL DX/OHS AUDIT: ICD-10-PCS | Mod: S$GLB,,, | Performed by: FAMILY MEDICINE

## 2022-04-18 RX ORDER — AMLODIPINE BESYLATE 2.5 MG/1
2.5 TABLET ORAL DAILY
COMMUNITY
End: 2022-06-28

## 2022-04-18 RX ORDER — ALLOPURINOL 300 MG/1
300 TABLET ORAL DAILY
COMMUNITY

## 2022-04-18 RX ORDER — PANTOPRAZOLE SODIUM 40 MG/1
40 TABLET, DELAYED RELEASE ORAL DAILY
COMMUNITY

## 2022-04-18 NOTE — PROGRESS NOTES
Subjective:      Patient ID: Raymundo Restrepo is a 67 y.o. male.    Chief Complaint: Establish Care (Upper raspatory infection bad cough )    HPI    Patient with pmhx of CAD s/p stent (), TIA (x5 - last one 3 years ago), PKD s/p renal transplant (right), melanoma, Gout, GERD, c.diff (s/p fecal transplant), non-hodgkin's lymphoma () here today to establish care and respiratory infection.      Notes that he has been having an upper respiratory infection for the last 3 weeks and started feeling a little better this past Friday (3 days ago). Some mild fever, coughing, night sweats, increase in mucous. He has been using mucinex for this has helped. Feeling good today.     Nephrology - Dr. Crump  Cardiology - Dr. Brain Koroma ([facility]) [phone]  Dermatology - Dr. Moody ([facility])   Gastro/infection disease - Dr. Liang     Patient travels back and fourth to [facility]     Patient is not on cholesterol medication or asa -     Past Medical History:   Diagnosis Date    C. difficile diarrhea     CAD (coronary artery disease), native coronary artery      Stent placed 2007 In-stent restenosis, required a 2nd stent 2010    -donor kidney transplant 2010    Gross hematuria     history of gross hematuria    Hiatal hernia     Hypertension 09/15/2014    Ischemic colitis     Kidney stones     Need for prophylactic immunotherapy     Non-Hodgkin's lymphoma     PKD (polycystic kidney disease)     Recurrent skin cancer     Renal hypertension     Ruptured cyst of kidney     Skin cancer     TIA (transient ischemic attack)     x3, no residual       Past Surgical History:   Procedure Laterality Date    CARDIAC CATHETERIZATION  2012    COLONOSCOPY N/A 2021    Procedure: Open Biome Colonoscopy Fecal Transplant;  Surgeon: Andrew Peter MD;  Location: [facility] ([address] [place] [address]);  Service: Endoscopy;  Laterality: N/A;  needs 1 hour block, contact isolation, terminal clean after -  Dr Peter  No Hold Plavix per Dr Peter / 2 day hold antibx per Dr Peter  Covid-19 [facility] 6/1/21 at [facility]    CORONARY STENT PLACEMENT  2008 & 2010    ESOPHAGOGASTRODUODENOSCOPY N/A 2/5/2019    Procedure: EGD (ESOPHAGOGASTRODUODENOSCOPY);  Surgeon: Jose G Tafoya MD;  Location: [address]);  Service: Endoscopy;  Laterality: N/A;  Plavix held with permission from Dr Kali Koroma.    KIDNEY TRANSPLANT  08/28/2010       Family History   Problem Relation Age of Onset    Kidney disease Mother     Stroke Father     Heart disease Father     Kidney disease Sister     Kidney disease Brother     Colon cancer Brother 56    Liver disease Brother     Melanoma Neg Hx     Psoriasis Neg Hx     Lupus Neg Hx     Esophageal cancer Neg Hx        Social History     Socioeconomic History    Marital status:    Occupational History     Employer: [employer]   Tobacco Use    Smoking status: Never Smoker    Smokeless tobacco: Never Used   Substance and Sexual Activity    Alcohol use: No    Drug use: No   Social History Narrative    consultant       Health Maintenance Topics with due status: Not Due       Topic Last Completion Date    Colorectal Cancer Screening 06/04/2021    Lipid Panel 10/06/2021    Aspirin/Antiplatelet Therapy 04/18/2022       Medication List with Changes/Refills   Current Medications    ACETAMINOPHEN (TYLENOL) 500 MG TABLET    Take 1 tablet (500 mg total) by mouth every 6 (six) hours as needed for Pain.    ALLOPURINOL (ZYLOPRIM) 300 MG TABLET    Take 300 mg by mouth once daily.    AMLODIPINE (NORVASC) 2.5 MG TABLET    Take 2.5 mg by mouth once daily.    BENAZEPRIL (LOTENSIN) 5 MG TABLET    Take 5 mg by mouth.    CARVEDILOL (COREG) 12.5 MG TABLET    Take 12.5 mg by mouth 2 (two) times daily.     CLOPIDOGREL (PLAVIX) 75 MG TABLET    Take 1 tablet (75 mg total) by mouth once daily.    FLUOROURACIL (EFUDEX) 5 % CREAM    AAA on face BID x 4-6 weeks. Stop if blistered, oozing, or bleeding.  Use daily sun protection.    MULTIVITAMIN (THERAGRAN) PER TABLET    Take 1 tablet by mouth Daily.    MYCOPHENOLATE (CELLCEPT) 250 MG CAP    Take 1 capsule (250 mg total) by mouth 2 (two) times daily.    NITROGLYCERIN (NITROSTAT) 0.4 MG SL TABLET    Place 0.4 mg under the tongue.    PANTOPRAZOLE (PROTONIX) 40 MG TABLET    Take 40 mg by mouth once daily.    SODIUM BICARBONATE 650 MG TABLET    Take 650 mg by mouth 2 (two) times daily.    SODIUM POLYSTYRENE (KAYEXALATE) POWDER    take 15(grams) and mix with 60mls of water and drink as 1 dose>    TACROLIMUS (PROGRAF) 0.5 MG CAP    Take 0.5 mg by mouth every evening.    TACROLIMUS (PROGRAF) 1 MG CAP    Take 1 mg by mouth every morning.   Discontinued Medications    ALLOPURINOL (ZYLOPRIM) 100 MG TABLET    Take 3 tablets (300 mg total) by mouth once daily.    AMLODIPINE (NORVASC) 5 MG TABLET    Take 1 tablet (5 mg total) by mouth daily.    PREDNISONE (DELTASONE) 20 MG TABLET    Take 3 tab in am x 3d, then 2 tab in am x 3d, then 1 tab in am x 3d, then 1/2 tab in am x 3d       Review of patient's allergies indicates:   Allergen Reactions    Iodine Anaphylaxis     Other reaction(s): stop breathing  Other reaction(s): Anaphylaxis    Morphine Nausea And Vomiting       Review of Systems   Constitutional: Negative for fever.   HENT: Negative for congestion.    Eyes: Negative for blurred vision.   Respiratory: Positive for cough. Negative for shortness of breath.    Cardiovascular: Negative for chest pain and leg swelling.   Gastrointestinal: Negative for abdominal pain, constipation and diarrhea.   Genitourinary: Negative for dysuria.   Skin: Negative for rash.   Neurological: Negative for headaches.       Objective:     Vitals:    04/18/22 0822   BP: 130/60   Pulse: 78   Temp: 97.6 °F (36.4 °C)     Body mass index is 29.23 kg/m².    Physical Exam  Vitals and nursing note reviewed.   Constitutional:       General: He is not in acute distress.     Appearance: He is  well-developed.   HENT:      Head: Normocephalic and atraumatic.      Right Ear: External ear normal.      Left Ear: External ear normal.      Nose: Nose normal.   Eyes:      Conjunctiva/sclera: Conjunctivae normal.      Pupils: Pupils are equal, round, and reactive to light.   Neck:      Thyroid: No thyromegaly.   Cardiovascular:      Rate and Rhythm: Normal rate and regular rhythm.      Heart sounds: Normal heart sounds. No murmur heard.  Pulmonary:      Effort: Pulmonary effort is normal. No respiratory distress.      Breath sounds: Normal breath sounds. No wheezing or rales.   Chest:      Chest wall: No tenderness.   Abdominal:      General: Bowel sounds are normal. There is no distension.      Palpations: Abdomen is soft.      Tenderness: There is no abdominal tenderness.   Lymphadenopathy:      Cervical: No cervical adenopathy.   Skin:     General: Skin is warm and dry.   Neurological:      Mental Status: He is alert and oriented to person, place, and time.         Assessment and Plan:     Encounter to establish care  Reviewed chart and history with patient    History of TIA (transient ischemic attack)  On plavix, no statin - has never seen neurology - will call cardiology and discuss statin initiation      Coronary artery disease involving native coronary artery of native heart without angina pectoris  See above    Immunodeficiency, unspecified  Following transplant team    History of Malignant melanoma, unspecified site  Following derm    History of Ischemic colitis  Gastro/ID     Polycystic kidney disease  S/p renal transplant  Transplant team    Primary hypertension  Blood pressure controlled at today's visit   Continue with current medications   Ambulatory logs of blood pressure readings recommended   DASH diet, weight loss, exercise     Hyperglycemia  -     Hemoglobin A1C; Future; Expected date: 04/18/2022    URI - improving, will hold on prescription treatment. OTC meds as needed for pain.    No  follow-ups on file.

## 2022-04-18 NOTE — TELEPHONE ENCOUNTER
----- Message from Natalia Srinivasan sent at 4/18/2022  3:44 PM CDT -----  Type:  Patient Returning Call    Who Called:PT   Would the patient rather a call back or a response via [facility]? Call back   Best Call Back Number: [phone]   Additional Information:  Portal Message     Message  Appointment Request From: Raymundo Restrepo  With Provider: Tamara Moody  Preferred Date Range: Any  Preferred Times: Any Time    Reason for visit: Follow up skin cancers    Comments:  Check new areas of concern that cream is not healing

## 2022-04-20 NOTE — PROGRESS NOTES
Patient, Raymundo Restrepo (MRN #[id number]), presented with a recent Platelet count less than 150 K/uL consistent with the definition of thrombocytopenia (ICD10 - D69.6).    Platelets   Date Value Ref Range Status   07/22/2021 148 (L) 150 - 450 K/uL Final     The patient's thrombocytopenia was monitored, evaluated, addressed and/or treated. This addendum to the medical record is made on 04/20/2022.

## 2022-04-20 NOTE — PROGRESS NOTES
Patient, Raymundo Restrepo (MRN #[id number]), presented with a recent Estimated Glumerular Filtration Rate (EGFR) between 30 and 45 consistent with the definition of chronic kidney disease stage 3 - moderate (ICD10 - N18.3).    eGFR if non    Date Value Ref Range Status   07/22/2021 45 (A) >60 mL/min/1.73 m^2 Final     Comment:     Calculation used to obtain the estimated glomerular filtration  rate (eGFR) is the CKD-EPI equation.          The patient's chronic kidney disease stage 3 was monitored, evaluated, addressed and/or treated. This addendum to the medical record is made on 04/20/2022.

## 2022-04-21 ENCOUNTER — TELEPHONE (OUTPATIENT)
Dept: ENDOSCOPY | Facility: HOSPITAL | Age: 67
End: 2022-04-21
Payer: MEDICARE

## 2022-04-21 NOTE — TELEPHONE ENCOUNTER
Saw NP today.  Having diarrhea and possible C-diff  D-diff test pending. Started on Antimycin 125 q 6r.  Wanted to know if you would suggest anything else

## 2022-04-21 NOTE — TELEPHONE ENCOUNTER
----- Message from An Anderson sent at 4/21/2022  4:13 PM CDT -----  Regarding: Jefferson Paulino is returning the nurses phone call in ref to the pt can you please call Jefferson at [phone].    CHUCHO

## 2022-04-21 NOTE — TELEPHONE ENCOUNTER
----- Message from Gonzales Poncefabiolacorona sent at 4/21/2022  1:11 PM CDT -----  Regarding: Jefferson Carrasco NP - call back      The caller states that he is a NP at the [facility] in [facility] and would like to discuss the Pt with you as he just left the caller's clinic    Office Ph # [phone] (Jefferson Carrasco the NP)

## 2022-04-21 NOTE — TELEPHONE ENCOUNTER
MD Love Witt MA  Caller: Unspecified (Today,  2:49 PM)  No additional recommendations, just please ask to send us results if C diff does come back positive   Left message

## 2022-05-02 ENCOUNTER — OFFICE VISIT (OUTPATIENT)
Dept: FAMILY MEDICINE | Facility: CLINIC | Age: 67
End: 2022-05-02
Payer: MEDICARE

## 2022-05-02 ENCOUNTER — CLINICAL SUPPORT (OUTPATIENT)
Dept: FAMILY MEDICINE | Facility: CLINIC | Age: 67
End: 2022-05-02
Payer: MEDICARE

## 2022-05-02 VITALS
OXYGEN SATURATION: 97 % | RESPIRATION RATE: 18 BRPM | HEART RATE: 70 BPM | HEIGHT: 66 IN | BODY MASS INDEX: 28.53 KG/M2 | DIASTOLIC BLOOD PRESSURE: 73 MMHG | TEMPERATURE: 97 F | SYSTOLIC BLOOD PRESSURE: 116 MMHG | WEIGHT: 177.5 LBS

## 2022-05-02 DIAGNOSIS — A04.71 RECURRENT CLOSTRIDIUM DIFFICILE DIARRHEA: ICD-10-CM

## 2022-05-02 DIAGNOSIS — Z00.00 ENCOUNTER FOR PREVENTIVE HEALTH EXAMINATION: Primary | ICD-10-CM

## 2022-05-02 DIAGNOSIS — Q61.3 POLYCYSTIC KIDNEY DISEASE: ICD-10-CM

## 2022-05-02 DIAGNOSIS — C44.99 RECURRENT SKIN CANCER: Chronic | ICD-10-CM

## 2022-05-02 DIAGNOSIS — H90.3 SENSORINEURAL HEARING LOSS (SNHL) OF BOTH EARS: ICD-10-CM

## 2022-05-02 DIAGNOSIS — Z86.73 HISTORY OF TRANSIENT ISCHEMIC ATTACK (TIA): ICD-10-CM

## 2022-05-02 DIAGNOSIS — I10 PRIMARY HYPERTENSION: Chronic | ICD-10-CM

## 2022-05-02 DIAGNOSIS — I25.10 CORONARY ARTERY DISEASE INVOLVING NATIVE CORONARY ARTERY OF NATIVE HEART WITHOUT ANGINA PECTORIS: Chronic | ICD-10-CM

## 2022-05-02 DIAGNOSIS — N18.31 STAGE 3A CHRONIC KIDNEY DISEASE: Chronic | ICD-10-CM

## 2022-05-02 DIAGNOSIS — Z29.89 NEED FOR PROPHYLACTIC IMMUNOTHERAPY: Chronic | ICD-10-CM

## 2022-05-02 DIAGNOSIS — Z94.0 S/P CADAVER RENAL TRANSPLANT: ICD-10-CM

## 2022-05-02 DIAGNOSIS — K55.9 ISCHEMIC COLITIS: ICD-10-CM

## 2022-05-02 DIAGNOSIS — Z23 NEED FOR VACCINATION AGAINST STREPTOCOCCUS PNEUMONIAE: Primary | ICD-10-CM

## 2022-05-02 PROCEDURE — 3008F PR BODY MASS INDEX (BMI) DOCUMENTED: ICD-10-PCS | Mod: CPTII,S$GLB,, | Performed by: NURSE PRACTITIONER

## 2022-05-02 PROCEDURE — 1159F PR MEDICATION LIST DOCUMENTED IN MEDICAL RECORD: ICD-10-PCS | Mod: CPTII,S$GLB,, | Performed by: NURSE PRACTITIONER

## 2022-05-02 PROCEDURE — 99999 PR PBB SHADOW E&M-EST. PATIENT-LVL V: CPT | Mod: PBBFAC,,, | Performed by: NURSE PRACTITIONER

## 2022-05-02 PROCEDURE — 3044F PR MOST RECENT HEMOGLOBIN A1C LEVEL <7.0%: ICD-10-PCS | Mod: CPTII,S$GLB,, | Performed by: NURSE PRACTITIONER

## 2022-05-02 PROCEDURE — 3288F FALL RISK ASSESSMENT DOCD: CPT | Mod: CPTII,S$GLB,, | Performed by: NURSE PRACTITIONER

## 2022-05-02 PROCEDURE — G0439 PPPS, SUBSEQ VISIT: HCPCS | Mod: S$GLB,,, | Performed by: NURSE PRACTITIONER

## 2022-05-02 PROCEDURE — 99999 PR PBB SHADOW E&M-EST. PATIENT-LVL II: CPT | Mod: PBBFAC,,,

## 2022-05-02 PROCEDURE — 4010F PR ACE/ARB THEARPY RXD/TAKEN: ICD-10-PCS | Mod: CPTII,S$GLB,, | Performed by: NURSE PRACTITIONER

## 2022-05-02 PROCEDURE — G0439 PR MEDICARE ANNUAL WELLNESS SUBSEQUENT VISIT: ICD-10-PCS | Mod: S$GLB,,, | Performed by: NURSE PRACTITIONER

## 2022-05-02 PROCEDURE — 3044F HG A1C LEVEL LT 7.0%: CPT | Mod: CPTII,S$GLB,, | Performed by: NURSE PRACTITIONER

## 2022-05-02 PROCEDURE — G0009 PNEUMOCOCCAL CONJUGATE VACCINE 13-VALENT LESS THAN 5YO & GREATER THAN: ICD-10-PCS | Mod: S$GLB,,, | Performed by: FAMILY MEDICINE

## 2022-05-02 PROCEDURE — 3078F DIAST BP <80 MM HG: CPT | Mod: CPTII,S$GLB,, | Performed by: NURSE PRACTITIONER

## 2022-05-02 PROCEDURE — 90670 PCV13 VACCINE IM: CPT | Mod: S$GLB,,, | Performed by: FAMILY MEDICINE

## 2022-05-02 PROCEDURE — 4010F ACE/ARB THERAPY RXD/TAKEN: CPT | Mod: CPTII,S$GLB,, | Performed by: NURSE PRACTITIONER

## 2022-05-02 PROCEDURE — 3288F PR FALLS RISK ASSESSMENT DOCUMENTED: ICD-10-PCS | Mod: CPTII,S$GLB,, | Performed by: NURSE PRACTITIONER

## 2022-05-02 PROCEDURE — G0009 ADMIN PNEUMOCOCCAL VACCINE: HCPCS | Mod: S$GLB,,, | Performed by: FAMILY MEDICINE

## 2022-05-02 PROCEDURE — 1101F PR PT FALLS ASSESS DOC 0-1 FALLS W/OUT INJ PAST YR: ICD-10-PCS | Mod: CPTII,S$GLB,, | Performed by: NURSE PRACTITIONER

## 2022-05-02 PROCEDURE — 1125F AMNT PAIN NOTED PAIN PRSNT: CPT | Mod: CPTII,S$GLB,, | Performed by: NURSE PRACTITIONER

## 2022-05-02 PROCEDURE — 1101F PT FALLS ASSESS-DOCD LE1/YR: CPT | Mod: CPTII,S$GLB,, | Performed by: NURSE PRACTITIONER

## 2022-05-02 PROCEDURE — 1125F PR PAIN SEVERITY QUANTIFIED, PAIN PRESENT: ICD-10-PCS | Mod: CPTII,S$GLB,, | Performed by: NURSE PRACTITIONER

## 2022-05-02 PROCEDURE — 3074F SYST BP LT 130 MM HG: CPT | Mod: CPTII,S$GLB,, | Performed by: NURSE PRACTITIONER

## 2022-05-02 PROCEDURE — 99499 RISK ADDL DX/OHS AUDIT: ICD-10-PCS | Mod: S$GLB,,, | Performed by: NURSE PRACTITIONER

## 2022-05-02 PROCEDURE — 99999 PR PBB SHADOW E&M-EST. PATIENT-LVL V: ICD-10-PCS | Mod: PBBFAC,,, | Performed by: NURSE PRACTITIONER

## 2022-05-02 PROCEDURE — 1159F MED LIST DOCD IN RCRD: CPT | Mod: CPTII,S$GLB,, | Performed by: NURSE PRACTITIONER

## 2022-05-02 PROCEDURE — 90670 PNEUMOCOCCAL CONJUGATE VACCINE 13-VALENT LESS THAN 5YO & GREATER THAN: ICD-10-PCS | Mod: S$GLB,,, | Performed by: FAMILY MEDICINE

## 2022-05-02 PROCEDURE — 1160F RVW MEDS BY RX/DR IN RCRD: CPT | Mod: CPTII,S$GLB,, | Performed by: NURSE PRACTITIONER

## 2022-05-02 PROCEDURE — 3074F PR MOST RECENT SYSTOLIC BLOOD PRESSURE < 130 MM HG: ICD-10-PCS | Mod: CPTII,S$GLB,, | Performed by: NURSE PRACTITIONER

## 2022-05-02 PROCEDURE — 3008F BODY MASS INDEX DOCD: CPT | Mod: CPTII,S$GLB,, | Performed by: NURSE PRACTITIONER

## 2022-05-02 PROCEDURE — 99499 UNLISTED E&M SERVICE: CPT | Mod: S$GLB,,, | Performed by: NURSE PRACTITIONER

## 2022-05-02 PROCEDURE — 1160F PR REVIEW ALL MEDS BY PRESCRIBER/CLIN PHARMACIST DOCUMENTED: ICD-10-PCS | Mod: CPTII,S$GLB,, | Performed by: NURSE PRACTITIONER

## 2022-05-02 PROCEDURE — 3078F PR MOST RECENT DIASTOLIC BLOOD PRESSURE < 80 MM HG: ICD-10-PCS | Mod: CPTII,S$GLB,, | Performed by: NURSE PRACTITIONER

## 2022-05-02 PROCEDURE — 99999 PR PBB SHADOW E&M-EST. PATIENT-LVL II: ICD-10-PCS | Mod: PBBFAC,,,

## 2022-05-02 NOTE — Clinical Note
Your patient was seen today for a HRA visit. I have included a copy of my visit note, please review the note and feel free to contact me with any questions.  Thank you for allowing me to participate in the care of your patients.  Essie Godinze NP

## 2022-05-02 NOTE — PROGRESS NOTES
Pt. Came in for prevnar 13 injection. Order current. Meds and allergies reviewed. Med administered per MD order. Pt. Tolerated well. Pt. instructed to wait in clinic 15 minutes. Pt. verbalized understanding.

## 2022-05-02 NOTE — PROGRESS NOTES
"  Raymundo Restrepo presented for a  Medicare AWV and comprehensive Health Risk Assessment today. The following components were reviewed and updated:    · Medical history  · Family History  · Social history  · Allergies and Current Medications  · Health Risk Assessment  · Health Maintenance  · Care Team         ** See Completed Assessments for Annual Wellness Visit within the encounter summary.**         The following assessments were completed:  · Living Situation  · CAGE  · Depression Screening  · Timed Get Up and Go  · Whisper Test  · Cognitive Function Screening  · Nutrition Screening  · ADL Screening  · PAQ Screening        Vitals:    05/02/22 1435   BP: 116/73   Pulse: 70   Resp: 18   Temp: 96.9 °F (36.1 °C)   SpO2: 97%   Weight: 80.5 kg (177 lb 7.5 oz)   Height: 5' 6" (1.676 m)     Body mass index is 28.64 kg/m².  Physical Exam  Vitals and nursing note reviewed.   Constitutional:       General: He is not in acute distress.     Appearance: He is well-developed.   HENT:      Head: Normocephalic and atraumatic.   Eyes:      Pupils: Pupils are equal, round, and reactive to light.   Neck:      Vascular: No carotid bruit.   Cardiovascular:      Rate and Rhythm: Normal rate and regular rhythm.      Pulses: Normal pulses.      Heart sounds: Normal heart sounds. No murmur heard.    No gallop.   Pulmonary:      Effort: Pulmonary effort is normal.      Breath sounds: Normal breath sounds.   Abdominal:      General: Bowel sounds are normal. There is no distension.      Palpations: Abdomen is soft.      Tenderness: There is no abdominal tenderness.   Musculoskeletal:         General: Normal range of motion.   Skin:     General: Skin is warm and dry.   Neurological:      Motor: No abnormal muscle tone.      Gait: Gait normal.   Psychiatric:         Speech: Speech normal.         Behavior: Behavior normal.         Thought Content: Thought content normal.         Judgment: Judgment normal.         Current Outpatient Medications: "     acetaminophen (TYLENOL) 500 MG tablet, Take 1 tablet (500 mg total) by mouth every 6 (six) hours as needed for Pain., Disp: , Rfl:     allopurinoL (ZYLOPRIM) 300 MG tablet, Take 300 mg by mouth once daily., Disp: , Rfl:     amlodipine (NORVASC) 2.5 MG tablet, Take 2.5 mg by mouth once daily., Disp: , Rfl:     benazepril (LOTENSIN) 5 MG tablet, Take 5 mg by mouth., Disp: , Rflp:     carvedilol (COREG) 12.5 MG tablet, Take 12.5 mg by mouth 2 (two) times daily. , Disp: , Rfl:     clopidogrel (PLAVIX) 75 mg tablet, Take 1 tablet (75 mg total) by mouth once daily., Disp: , Rflp:     fluorouracil (EFUDEX) 5 % cream, AAA on face BID x 4-6 weeks. Stop if blistered, oozing, or bleeding. Use daily sun protection., Disp: 40 G, Rflp: 1    multivitamin (THERAGRAN) per tablet, Take 1 tablet by mouth Daily., Disp: , Rflp:     mycophenolate (CELLCEPT) 250 mg Cap, Take 1 capsule (250 mg total) by mouth 2 (two) times daily., Disp: 60 capsule, Rfl: 11    pantoprazole (PROTONIX) 40 MG tablet, Take 40 mg by mouth once daily., Disp: , Rfl:     sodium bicarbonate 650 MG tablet, Take 650 mg by mouth 2 (two) times daily., Disp: , Rfl:     sodium polystyrene (KAYEXALATE) powder, take 15(grams) and mix with 60mls of water and drink as 1 dose>, Disp: 15 g, Rfl: 0    tacrolimus (PROGRAF) 0.5 MG Cap, Take 0.5 mg by mouth every evening., Disp: , Rfl:     tacrolimus (PROGRAF) 1 MG Cap, Take 1 mg by mouth every morning., Disp: , Rfl:     nitroGLYCERIN (NITROSTAT) 0.4 MG SL tablet, Place 0.4 mg under the tongue., Disp: , Rfl:       Diagnoses and health risks identified today and associated recommendations/orders:    1. Encounter for preventive health examination    2. Stage 3a chronic kidney disease  Creatinine 0.60 - 1.20 mg/dL 1.41 High     Chronic and Stable renal function. Continue current treatment plan as previously prescribed with your renal team in [place] and nephrologist in [place]    3. S/p renal transplant  Chronic and  Stable due to hx of polycystic kidney.  See med list  S/CP transplant 2010- followed by transplant team     4. Primary hypertension  Chronic and Stable on Lotensin and Coreg. Continue current treatment plan as previously prescribed with your nephrologist in [place] and renal team in    5. Chronic immunosuppression with Prograf and Cellcept  Chronic and Stable.  SEE  med's Followed by renal transplant    6. Coronary artery disease involving native coronary artery of native heart without angina pectoris  Chronic and Stable on Pl. Pt has cardiac stent  X 1  Continue current treatment plan as previously prescribed with your cardiologist    7. Recurrent Clostridium difficile diarrhea  Chronic and Ongoing. States reoccurrence in last 2 weeks - now on Vanco- states down  6 episode diarrhea per day - followed by GI and infectious control Md     8. Ischemic colitis  Chronic and Stable. Continue current treatment plan as previously prescribed with your gastro Md     9. Recurrent skin cancer  Chronic and Ongoing on luorouraciL (EFUDEX) 5 % cream, AAA on face BID x 4-6 weeks.  . States chronic lessons- scheduled to see dermatologist    10. Sensorineural hearing loss (SNHL) of both ears  Chronic and Stable - states hearing has improved - followed by audiologist    11. Polycystic kidney disease  Chronic and Ongoing see #  2  . States recurrent cyst rupture- followed by nephrologist    12. History of transient ischemic attack (TIA)  Stable- states he had 3 episode in past - stable BP/cholesterol levels    I offered to discuss advanced care planning, including how to pick a person who would make decisions for you if you were unable to make them for yourself, called a health care power of , and what kind of decisions you might make such as use of life sustaining treatments such as ventilators and tube feeding when faced with a life limiting illness recorded on a living will that they will need to know. (How you want to  be cared for as you near the end of your natural life)     X Patient is interested in learning more about how to make advanced directives.  I provided them paperwork and offered to discuss this with them.    Provided Raymundo with a 5-10 year written screening schedule and personal prevention plan. Recommendations were developed using the USPHS age appropriate recommendations. Education, counseling, and referrals were provided as needed. After Visit Summary printed and given to patient which includes a list of additional screenings\tests needed.     1 year annual  Annual   Esise Godinez NP

## 2022-05-02 NOTE — PATIENT INSTRUCTIONS
Counseling and Referral of Other Preventative  (Italic type indicates deductible and co-insurance are waived)    Patient Name: Raymundo Restrepo  Today's Date: 5/2/2022    Health Maintenance       Date Due Completion Date    Shingles Vaccine (1 of 2) Never done ---    High Dose Statin Never done ---    TETANUS VACCINE 09/13/2015 9/13/2005    COVID-19 Vaccine (3 - Booster for Pfizer series) 12/21/2021 7/21/2021    Influenza Vaccine (Season Ended) 09/01/2022 11/24/2009    Aspirin/Antiplatelet Therapy 04/18/2023 4/18/2022    Pneumococcal Vaccines (Age 65+) (3 - PPSV23 or PCV20) 05/02/2023 5/2/2022    Colorectal Cancer Screening 06/04/2026 6/4/2021    Lipid Panel 10/06/2026 10/6/2021        No orders of the defined types were placed in this encounter.    The following information is provided to all patients.  This information is to help you find resources for any of the problems found today that may be affecting your health:                Living healthy guide: www.[facility].[place].[facility]      Understanding Diabetes: www.diabetes.org      Eating healthy: www.cdc.gov/healthyweight      [facility] home safety checklist: www.cdc.gov/steadi/patient.html      Agency on Aging: www.goea.[place].gov      Alcoholics anonymous (AA): www.aa.org      Physical Activity: www.david.nih.gov/qn0foem      Tobacco use: www.quitwithusla.org     Counseling and Referral of Other Preventative  (Italic type indicates deductible and co-insurance are waived)    Patient Name: Raymundo Restrepo  Today's Date: 5/4/2022    Health Maintenance       Date Due Completion Date    Shingles Vaccine (1 of 2) Never done ---    TETANUS VACCINE 09/13/2015 9/13/2005    COVID-19 Vaccine (3 - Booster for Pfizer series) 12/21/2021 7/21/2021    High Dose Statin 05/16/2022 (Originally 1/18/1976) ---    Influenza Vaccine (Season Ended) 09/01/2022 11/24/2009    Aspirin/Antiplatelet Therapy 05/02/2023 5/2/2022    Pneumococcal Vaccines (Age 65+) (3 - PPSV23 or PCV20) 05/02/2023 5/2/2022     Colorectal Cancer Screening 06/04/2026 6/4/2021    Lipid Panel 10/06/2026 10/6/2021        No orders of the defined types were placed in this encounter.    The following information is provided to all patients.  This information is to help you find resources for any of the problems found today that may be affecting your health:                Living healthy guide: www.[facility].[place].[facility]      Understanding Diabetes: www.diabetes.org      Eating healthy: www.cdc.gov/healthyweight      [facility] home safety checklist: www.cdc.gov/steadi/patient.html      Agency on Aging: www.goea.[place].[facility]      Alcoholics anonymous (AA): www.aa.org      Physical Activity: www.david.nih.gov/vs2iavu      Tobacco use: www.quitwithusla.org

## 2022-05-05 ENCOUNTER — DOCUMENTATION ONLY (OUTPATIENT)
Dept: TRANSPLANT | Facility: CLINIC | Age: 67
End: 2022-05-05
Payer: MEDICARE

## 2022-05-11 ENCOUNTER — PATIENT MESSAGE (OUTPATIENT)
Dept: RESEARCH | Facility: CLINIC | Age: 67
End: 2022-05-11
Payer: MEDICARE

## 2022-06-08 ENCOUNTER — PATIENT MESSAGE (OUTPATIENT)
Dept: FAMILY MEDICINE | Facility: CLINIC | Age: 67
End: 2022-06-08
Payer: MEDICARE

## 2022-06-09 ENCOUNTER — TELEPHONE (OUTPATIENT)
Dept: GASTROENTEROLOGY | Facility: CLINIC | Age: 67
End: 2022-06-09
Payer: MEDICARE

## 2022-06-09 NOTE — TELEPHONE ENCOUNTER
Spoke with patient - back in [place]. Has bright red blood in addition to c.diff symptoms. Has appointment with PCP in [place] this afternoon. Feeling stable/ok enough to wait. I did encourage to go to ER if begins to feel worse and continues bleeding.

## 2022-06-09 NOTE — TELEPHONE ENCOUNTER
Spoke to MERARI Paulino regarding message below.   Per Jefferson pt tested positive C. Diff on 4/22 treated with vancomycin 125 q8 pt seem better after completing med then 2/3 days later pt went to the ED for blood in stool and diarrhea tested positive on 6/2 for C. Diff currently taking vancomycin q6. Jefferson requesting now be seen by Dr. Peter for recurrent C. Diff.   Message routed to Dr. Peter to advise     ----- Message from Abdias Kwan sent at 6/9/2022  3:41 PM CDT -----  Contact: Jefferson ([facility]) [phone]  Caller (Jefferson) requesting a call back regarding the patient having re occurrences C Diff.  Caller would like to schedule an appt for the patient.

## 2022-06-17 ENCOUNTER — TELEPHONE (OUTPATIENT)
Dept: GASTROENTEROLOGY | Facility: CLINIC | Age: 67
End: 2022-06-17
Payer: MEDICARE

## 2022-06-17 NOTE — TELEPHONE ENCOUNTER
Attempted to contact pt 3x to arrange clinic appt with Dr. Peter for C.Diff. Pt didn't answer and I left a detail message to return call to our office.

## 2022-06-22 ENCOUNTER — TELEPHONE (OUTPATIENT)
Dept: GASTROENTEROLOGY | Facility: CLINIC | Age: 67
End: 2022-06-22
Payer: MEDICARE

## 2022-06-22 NOTE — TELEPHONE ENCOUNTER
Attempted to contact pt to arrange clinic appt with Dr. Peter for C.Diff. Pt didn't answer and I left a detail message to return call to our office.   ----- Message from Love Dean MA sent at 6/21/2022  3:38 PM CDT -----    ----- Message -----  From: Umm Hall  Sent: 6/21/2022   2:13 PM CDT  To: [facility] Morales Staff    Type:  Patient Returning Call    Who Called: pt    Who Left Message for Patient: Brandin    Does the patient know what this is regarding?:yes    Would the patient rather a call back or a response via [facility]? call    Best Call Back Number:[phone] (home)       Additional Information:

## 2022-06-22 NOTE — TELEPHONE ENCOUNTER
Spoke to pt and scheduled appt to be seen by Dr. Peter on 6/28 at 10 am for C.Diff. pt confirmed apt and thank me.  ----- Message from Natalia Blackwell sent at 6/22/2022 12:22 PM CDT -----  Regarding: missed call       Who Called: Raymundo         Who Left Message for Patient: Brandin         Does the patient know what this is regarding? Yes         Best Call Back Number:[phone]         Additional Information: He apologize he didnt here the phone ring

## 2022-06-27 NOTE — PROGRESS NOTES
[facility] Consultation Note      Reason for Consult:  Recurrent Clostridioides difficile infection    PCP: Beverly Segura      Referring MD:   No referring provider defined for this encounter.    HPI:  This is a 67 y.o. male here for evaluation of recurrent C. difficile infection.  This started on last December with food poisoning from [place].  Followed by hospitalization with sepsis and diarrhea, when C diff was diagnosed.    Hx of Diverticulitis in 2014, food poisoning in 2012 in [place], 2010 ischemic colitis after transplant with rectal bleeding.    Raymundo Restrepo  has been treated with:  Flagyl - no  Vancomycin multiple rounds since December - just finished pulse taper May 3. Typically comes back within a week  Dificid - Rx'd but not covered by insurance    Raymundo Restrepo is currently having 12  loose bowel movements a day off vancomycin   Additional symptoms include rectal bleeding, headaches    Risk factors for recurrence include (>3 at 1st episode or >4 at 1st recurrence = consider Zinplava):  67 y.o. (>65)  male (F)  immunocompromised status - Yes  Hemodialysis status - no  Inflammatory bowel disease -  no   PPI usage - Protonix  Healthcare exposure - no     Interval History 06/28/2022:  Has had recurrences of C diff in APril and June of this year after successful FMT in 2021  Had COVID x 2 (lost hearing) - led to heavy antibiotics end of Feb leading to C diff recurrence in April  Treated with vanco x 21 days initially with response  10 days later In May had fever/chills and clinical recurrence (diarrhea and rectal bleeding x 3 days) retreated with vanco taper  Was tested in June and was positive (in Care everywhere)    Currently having 5 loose BM/ day, has 3 vanco pills left taking q3days      ROS:  Constitutional: No fevers, chills, No weight loss  ENT: No allergies  CV: No chest pain  Pulm: No cough, No shortness of breath  Ophtho: No vision changes  GI: see HPI  Derm:  No rash  Heme: No lymphadenopathy, No bruising  MSK: No arthritis  : No dysuria, No hematuria  Endo: No hot or cold intolerance  Neuro: No syncope, No seizure, + headaches  Psych: No anxiety, No depression    Medical History:  has a past medical history of C. difficile diarrhea, CAD (coronary artery disease), native coronary artery , -donor kidney transplant (2010), Gross hematuria, Hiatal hernia, Hypertension (09/15/2014), Ischemic colitis, Kidney stones, Need for prophylactic immunotherapy, Non-Hodgkin's lymphoma (), PKD (polycystic kidney disease), Recurrent skin cancer, Renal hypertension, Ruptured cyst of kidney, Skin cancer, and TIA (transient ischemic attack).    Surgical History:  has a past surgical history that includes Cardiac catheterization (2012); Kidney transplant (2010); Coronary stent placement ( & ); Esophagogastroduodenoscopy (N/A, 2019); Colonoscopy (N/A, 2021); and fecal transplant (2021).    Family History: family history includes Colon cancer (age of onset: 56) in his brother; Heart disease in his father; Kidney disease in his brother, mother, and sister; Liver disease in his brother; Stroke in his father..     Social History:  reports that he has never smoked. He has never used smokeless tobacco. He reports that he does not drink alcohol and does not use drugs.    Review of patient's allergies indicates:   Allergen Reactions    Iodine Anaphylaxis     Other reaction(s): stop breathing  Other reaction(s): Anaphylaxis    Morphine Nausea And Vomiting       Current Outpatient Medications   Medication Sig Dispense Refill    acetaminophen (TYLENOL) 500 MG tablet Take 1 tablet (500 mg total) by mouth every 6 (six) hours as needed for Pain.      allopurinoL (ZYLOPRIM) 300 MG tablet Take 300 mg by mouth once daily.      benazepriL (LOTENSIN) 5 MG tablet Take 5 mg by mouth.      carvedilol (COREG) 12.5 MG tablet Take 12.5 mg by mouth 2  "(two) times daily.       clopidogreL (PLAVIX) 75 mg tablet Take 1 tablet (75 mg total) by mouth once daily.      fluorouraciL (EFUDEX) 5 % cream AAA on face BID x 4-6 weeks. Stop if blistered, oozing, or bleeding. Use daily sun protection. 40 g 1    multivitamin (THERAGRAN) per tablet Take 1 tablet by mouth Daily.      mycophenolate (CELLCEPT) 250 mg Cap Take 1 capsule (250 mg total) by mouth 2 (two) times daily. 60 capsule 11    nitroGLYCERIN (NITROSTAT) 0.4 MG SL tablet Place 0.4 mg under the tongue.      ondansetron (ZOFRAN-ODT) 4 MG TbDL Take 4 mg by mouth.      pantoprazole (PROTONIX) 40 MG tablet Take 40 mg by mouth once daily.      sodium bicarbonate 650 MG tablet Take 650 mg by mouth 2 (two) times daily.      tacrolimus (PROGRAF) 0.5 MG Cap Take 0.5 mg by mouth every evening.      tacrolimus (PROGRAF) 1 MG Cap Take 1 mg by mouth every morning.      vancomycin (VANCOCIN) 125 MG capsule vancomycin 125 mg capsule   125 mg PO q12h x7 days, then 125 mg PO qd x7 days, then 125 mg PO q2-3 days x2-8wks      sodium polystyrene (KAYEXALATE) powder take 15(grams) and mix with 60mls of water and drink as 1 dose> 15 g 0     No current facility-administered medications for this visit.           Objective Findings:    Vital Signs: /69   Pulse (!) 57   Ht 5' 6" (1.676 m)   Wt 81.2 kg (179 lb 0.2 oz)   BMI 28.89 kg/m²   Body mass index is 28.89 kg/m².    Physical Exam:  General Appearance: Well appearing in no acute distress  Head:   Normocephalic, without obvious abnormality  Eyes:    No scleral icterus, EOMI  ENT: Neck supple, Lips, mucosa, and tongue normal; teeth and gums normal  Lungs: CTA bilaterally in anterior and posterior fields, no wheezes, no crackles.  Heart:  Regular rate and rhythm, S1, S2 normal, no murmurs heard  Abdomen: Soft, non tender, non distended with positive bowel sounds in all four quadrants. No hepatosplenomegaly, ascites, or mass  Extremities: 2+ pulses, no clubbing, " cyanosis or edema  Skin: No rash  Neurologic: CN II-XII intact, no asterixis      Labs:  Lab Results   Component Value Date    WBC 4.41 07/22/2021    HGB 10.2 (L) 07/22/2021    HCT 32.5 (L) 07/22/2021     (L) 07/22/2021    CHOL 143 08/28/2010    TRIG 178 (H) 08/28/2010    HDL 22 (L) 08/28/2010    ALT 17 07/22/2021    AST 23 07/22/2021     07/22/2021    K 4.3 07/22/2021     07/22/2021    CREATININE 1.6 (H) 07/22/2021    BUN 24 (H) 07/22/2021    CO2 15 (L) 07/22/2021    TSH 3.4 01/22/2008    PSA 1.2 04/23/2010    INR 1.0 05/13/2021    HGBA1C 5.3 04/18/2022       Lab Results   Component Value Date    CDIFFICILEAN Negative 05/19/2021     Lab Results   Component Value Date    CDIFFTOX Negative 05/19/2021     Results for PASCUAL COLEMAN (MRN [id number]) as of 5/17/2021 15:33   Ref. Range 11/11/2010 03:13 11/11/2010 12:35 12/27/2020 21:08 1/22/2021 15:43 2/26/2021 12:28 5/13/2021 11:38   C. diff Antigen Latest Ref Range: Negative    Positive (A) Positive (A) Positive (A) Positive (A)   C difficile Toxins A+B, EIA Latest Ref Range: Negative  Negative for C.difficile toxin Negative for C.difficile toxin Positive (A) Positive (A) Positive (A) Negative     6/22:  C Diff toxin,DNA Negative Positive Abnormal           Imaging:      Endoscopy:    Colon 2008 - wnl  EGD 2019 - Savary dilation  Colon 2021 - FMT    Assessment:  1. C. difficile colitis        Recommendations:    1. Refer to Rebiotix FMT enema open label trial  2. Trial of Enteragam bid In the interim as vanco is tapering    Order summary:  No orders of the defined types were placed in this encounter.      Thank you so much for allowing me to participate in the care of Pascual Lauro Peter MD

## 2022-06-28 ENCOUNTER — OFFICE VISIT (OUTPATIENT)
Dept: GASTROENTEROLOGY | Facility: CLINIC | Age: 67
End: 2022-06-28
Payer: MEDICARE

## 2022-06-28 VITALS
WEIGHT: 179 LBS | HEART RATE: 57 BPM | DIASTOLIC BLOOD PRESSURE: 69 MMHG | SYSTOLIC BLOOD PRESSURE: 134 MMHG | BODY MASS INDEX: 28.77 KG/M2 | HEIGHT: 66 IN

## 2022-06-28 DIAGNOSIS — A04.72 C. DIFFICILE COLITIS: Primary | ICD-10-CM

## 2022-06-28 PROCEDURE — 99999 PR PBB SHADOW E&M-EST. PATIENT-LVL II: CPT | Mod: PBBFAC,,, | Performed by: INTERNAL MEDICINE

## 2022-06-28 PROCEDURE — 3008F BODY MASS INDEX DOCD: CPT | Mod: CPTII,S$GLB,, | Performed by: INTERNAL MEDICINE

## 2022-06-28 PROCEDURE — 3044F HG A1C LEVEL LT 7.0%: CPT | Mod: CPTII,S$GLB,, | Performed by: INTERNAL MEDICINE

## 2022-06-28 PROCEDURE — 1101F PT FALLS ASSESS-DOCD LE1/YR: CPT | Mod: CPTII,S$GLB,, | Performed by: INTERNAL MEDICINE

## 2022-06-28 PROCEDURE — 3288F PR FALLS RISK ASSESSMENT DOCUMENTED: ICD-10-PCS | Mod: CPTII,S$GLB,, | Performed by: INTERNAL MEDICINE

## 2022-06-28 PROCEDURE — 3288F FALL RISK ASSESSMENT DOCD: CPT | Mod: CPTII,S$GLB,, | Performed by: INTERNAL MEDICINE

## 2022-06-28 PROCEDURE — 1101F PR PT FALLS ASSESS DOC 0-1 FALLS W/OUT INJ PAST YR: ICD-10-PCS | Mod: CPTII,S$GLB,, | Performed by: INTERNAL MEDICINE

## 2022-06-28 PROCEDURE — 4010F PR ACE/ARB THEARPY RXD/TAKEN: ICD-10-PCS | Mod: CPTII,S$GLB,, | Performed by: INTERNAL MEDICINE

## 2022-06-28 PROCEDURE — 3008F PR BODY MASS INDEX (BMI) DOCUMENTED: ICD-10-PCS | Mod: CPTII,S$GLB,, | Performed by: INTERNAL MEDICINE

## 2022-06-28 PROCEDURE — 99999 PR PBB SHADOW E&M-EST. PATIENT-LVL II: ICD-10-PCS | Mod: PBBFAC,,, | Performed by: INTERNAL MEDICINE

## 2022-06-28 PROCEDURE — 3075F SYST BP GE 130 - 139MM HG: CPT | Mod: CPTII,S$GLB,, | Performed by: INTERNAL MEDICINE

## 2022-06-28 PROCEDURE — 99214 OFFICE O/P EST MOD 30 MIN: CPT | Mod: S$GLB,,, | Performed by: INTERNAL MEDICINE

## 2022-06-28 PROCEDURE — 3075F PR MOST RECENT SYSTOLIC BLOOD PRESS GE 130-139MM HG: ICD-10-PCS | Mod: CPTII,S$GLB,, | Performed by: INTERNAL MEDICINE

## 2022-06-28 PROCEDURE — 3044F PR MOST RECENT HEMOGLOBIN A1C LEVEL <7.0%: ICD-10-PCS | Mod: CPTII,S$GLB,, | Performed by: INTERNAL MEDICINE

## 2022-06-28 PROCEDURE — 1126F PR PAIN SEVERITY QUANTIFIED, NO PAIN PRESENT: ICD-10-PCS | Mod: CPTII,S$GLB,, | Performed by: INTERNAL MEDICINE

## 2022-06-28 PROCEDURE — 4010F ACE/ARB THERAPY RXD/TAKEN: CPT | Mod: CPTII,S$GLB,, | Performed by: INTERNAL MEDICINE

## 2022-06-28 PROCEDURE — 99214 PR OFFICE/OUTPT VISIT, EST, LEVL IV, 30-39 MIN: ICD-10-PCS | Mod: S$GLB,,, | Performed by: INTERNAL MEDICINE

## 2022-06-28 PROCEDURE — 3078F PR MOST RECENT DIASTOLIC BLOOD PRESSURE < 80 MM HG: ICD-10-PCS | Mod: CPTII,S$GLB,, | Performed by: INTERNAL MEDICINE

## 2022-06-28 PROCEDURE — 1126F AMNT PAIN NOTED NONE PRSNT: CPT | Mod: CPTII,S$GLB,, | Performed by: INTERNAL MEDICINE

## 2022-06-28 PROCEDURE — 3078F DIAST BP <80 MM HG: CPT | Mod: CPTII,S$GLB,, | Performed by: INTERNAL MEDICINE

## 2022-06-28 RX ORDER — ONDANSETRON 4 MG/1
4 TABLET, ORALLY DISINTEGRATING ORAL
Status: ON HOLD | COMMUNITY
Start: 2022-06-03 | End: 2022-12-21 | Stop reason: HOSPADM

## 2022-06-28 RX ORDER — VANCOMYCIN HYDROCHLORIDE 125 MG/1
CAPSULE ORAL
COMMUNITY
End: 2022-06-29 | Stop reason: SDUPTHER

## 2022-06-29 ENCOUNTER — TELEPHONE (OUTPATIENT)
Dept: INFECTIOUS DISEASES | Facility: CLINIC | Age: 67
End: 2022-06-29
Payer: MEDICARE

## 2022-06-29 ENCOUNTER — PATIENT MESSAGE (OUTPATIENT)
Dept: INFECTIOUS DISEASES | Facility: CLINIC | Age: 67
End: 2022-06-29
Payer: MEDICARE

## 2022-06-29 DIAGNOSIS — A04.71 RECURRENT CLOSTRIDIOIDES DIFFICILE DIARRHEA: Primary | ICD-10-CM

## 2022-06-29 DIAGNOSIS — Z11.9 SCREENING EXAMINATION FOR INFECTIOUS DISEASE: ICD-10-CM

## 2022-06-29 RX ORDER — VANCOMYCIN HYDROCHLORIDE 125 MG/1
125 CAPSULE ORAL 4 TIMES DAILY
Qty: 68 CAPSULE | Refills: 0 | Status: SHIPPED | OUTPATIENT
Start: 2022-06-29 | End: 2022-07-16

## 2022-06-29 NOTE — TELEPHONE ENCOUNTER
Infectious Disease Research Referral:        Mr. Raymundo Restrepo is a 67 y.o. male who was referred to Dr. Peter for an FMT. Mr. Restrepo has a history of recurrent C.Diff. Dr. Peter is unable to obtain product for FMT from [employer] [facility] at this time. He referred patient to ID Research to contact to assess eligibility and interest in enrolling in studies currently open.      Mr. Restrepo was contacted to review history of C.Diff and past medical history. He had recurrent C.Diff and FMT in 2021 and was doing well until April of this year. He again developed C.Diff after he took course of antibiotics after COVID for ear infection. Since then he has had reoccurrence. Currently on extended course of Vancomycin. States normal bowel pattern is 2-3 soft semi-formed stools a day. At present have 5 loose stools/day that is preventing him from being able to do normal activities and hobbies to do frequency and urgency.      After discussion with patient determined he does not meet criteria for screening for Rebiotix [id number] open label FMT study. Offered and accepted to receive FMT enema through [facility] program. Will resume Vancomycin QID until 7/16/22 and plan to give FMT on 7/18/22

## 2022-07-07 ENCOUNTER — OFFICE VISIT (OUTPATIENT)
Dept: DERMATOLOGY | Facility: CLINIC | Age: 67
End: 2022-07-07
Payer: MEDICARE

## 2022-07-07 VITALS — WEIGHT: 179 LBS | HEIGHT: 66 IN | BODY MASS INDEX: 28.77 KG/M2 | RESPIRATION RATE: 18 BRPM

## 2022-07-07 DIAGNOSIS — Z12.83 SCREENING EXAM FOR SKIN CANCER: ICD-10-CM

## 2022-07-07 DIAGNOSIS — L81.4 LENTIGINES: ICD-10-CM

## 2022-07-07 DIAGNOSIS — D18.01 CHERRY ANGIOMA: ICD-10-CM

## 2022-07-07 DIAGNOSIS — L73.8 SEBACEOUS HYPERPLASIA: ICD-10-CM

## 2022-07-07 DIAGNOSIS — Z85.828 HX OF NONMELANOMA SKIN CANCER: ICD-10-CM

## 2022-07-07 DIAGNOSIS — L57.0 AK (ACTINIC KERATOSIS): ICD-10-CM

## 2022-07-07 DIAGNOSIS — D84.9 IMMUNOCOMPROMISED STATE: ICD-10-CM

## 2022-07-07 DIAGNOSIS — L82.1 SK (SEBORRHEIC KERATOSIS): ICD-10-CM

## 2022-07-07 DIAGNOSIS — D48.5 NEOPLASM OF UNCERTAIN BEHAVIOR OF SKIN: ICD-10-CM

## 2022-07-07 DIAGNOSIS — D22.9 MULTIPLE BENIGN NEVI: Primary | ICD-10-CM

## 2022-07-07 PROCEDURE — 11103 TANGNTL BX SKIN EA SEP/ADDL: CPT | Mod: S$GLB,,, | Performed by: DERMATOLOGY

## 2022-07-07 PROCEDURE — 17000 PR DESTRUCTION(LASER SURGERY,CRYOSURGERY,CHEMOSURGERY),PREMALIGNANT LESIONS,FIRST LESION: ICD-10-PCS | Mod: XS,S$GLB,, | Performed by: DERMATOLOGY

## 2022-07-07 PROCEDURE — 3288F FALL RISK ASSESSMENT DOCD: CPT | Mod: CPTII,S$GLB,, | Performed by: DERMATOLOGY

## 2022-07-07 PROCEDURE — 1126F PR PAIN SEVERITY QUANTIFIED, NO PAIN PRESENT: ICD-10-PCS | Mod: CPTII,S$GLB,, | Performed by: DERMATOLOGY

## 2022-07-07 PROCEDURE — 11103 PR TANGENTIAL BIOPSY, SKIN, EA ADDTL LESION: ICD-10-PCS | Mod: S$GLB,,, | Performed by: DERMATOLOGY

## 2022-07-07 PROCEDURE — 11102 PR TANGENTIAL BIOPSY, SKIN, SINGLE LESION: ICD-10-PCS | Mod: S$GLB,,, | Performed by: DERMATOLOGY

## 2022-07-07 PROCEDURE — 99499 UNLISTED E&M SERVICE: CPT | Mod: S$PBB,,, | Performed by: DERMATOLOGY

## 2022-07-07 PROCEDURE — 3288F PR FALLS RISK ASSESSMENT DOCUMENTED: ICD-10-PCS | Mod: CPTII,S$GLB,, | Performed by: DERMATOLOGY

## 2022-07-07 PROCEDURE — 3044F HG A1C LEVEL LT 7.0%: CPT | Mod: CPTII,S$GLB,, | Performed by: DERMATOLOGY

## 2022-07-07 PROCEDURE — 88305 TISSUE EXAM BY PATHOLOGIST: CPT | Performed by: DERMATOLOGY

## 2022-07-07 PROCEDURE — 17003 DESTRUCT PREMALG LES 2-14: CPT | Mod: S$GLB,,, | Performed by: DERMATOLOGY

## 2022-07-07 PROCEDURE — 4010F ACE/ARB THERAPY RXD/TAKEN: CPT | Mod: CPTII,S$GLB,, | Performed by: DERMATOLOGY

## 2022-07-07 PROCEDURE — 3008F BODY MASS INDEX DOCD: CPT | Mod: CPTII,S$GLB,, | Performed by: DERMATOLOGY

## 2022-07-07 PROCEDURE — 88305 TISSUE EXAM BY PATHOLOGIST: CPT | Mod: 26,,, | Performed by: DERMATOLOGY

## 2022-07-07 PROCEDURE — 11102 TANGNTL BX SKIN SINGLE LES: CPT | Mod: S$GLB,,, | Performed by: DERMATOLOGY

## 2022-07-07 PROCEDURE — 3008F PR BODY MASS INDEX (BMI) DOCUMENTED: ICD-10-PCS | Mod: CPTII,S$GLB,, | Performed by: DERMATOLOGY

## 2022-07-07 PROCEDURE — 17003 DESTRUCTION, PREMALIGNANT LESIONS; SECOND THROUGH 14 LESIONS: ICD-10-PCS | Mod: S$GLB,,, | Performed by: DERMATOLOGY

## 2022-07-07 PROCEDURE — 1126F AMNT PAIN NOTED NONE PRSNT: CPT | Mod: CPTII,S$GLB,, | Performed by: DERMATOLOGY

## 2022-07-07 PROCEDURE — 99213 OFFICE O/P EST LOW 20 MIN: CPT | Mod: 25,S$GLB,, | Performed by: DERMATOLOGY

## 2022-07-07 PROCEDURE — 99999 PR PBB SHADOW E&M-EST. PATIENT-LVL III: ICD-10-PCS | Mod: PBBFAC,,, | Performed by: DERMATOLOGY

## 2022-07-07 PROCEDURE — 99213 PR OFFICE/OUTPT VISIT, EST, LEVL III, 20-29 MIN: ICD-10-PCS | Mod: 25,S$GLB,, | Performed by: DERMATOLOGY

## 2022-07-07 PROCEDURE — 88305 TISSUE EXAM BY PATHOLOGIST: ICD-10-PCS | Mod: 26,,, | Performed by: DERMATOLOGY

## 2022-07-07 PROCEDURE — 1101F PT FALLS ASSESS-DOCD LE1/YR: CPT | Mod: CPTII,S$GLB,, | Performed by: DERMATOLOGY

## 2022-07-07 PROCEDURE — 3044F PR MOST RECENT HEMOGLOBIN A1C LEVEL <7.0%: ICD-10-PCS | Mod: CPTII,S$GLB,, | Performed by: DERMATOLOGY

## 2022-07-07 PROCEDURE — 4010F PR ACE/ARB THEARPY RXD/TAKEN: ICD-10-PCS | Mod: CPTII,S$GLB,, | Performed by: DERMATOLOGY

## 2022-07-07 PROCEDURE — 99499 RISK ADDL DX/OHS AUDIT: ICD-10-PCS | Mod: S$PBB,,, | Performed by: DERMATOLOGY

## 2022-07-07 PROCEDURE — 17000 DESTRUCT PREMALG LESION: CPT | Mod: XS,S$GLB,, | Performed by: DERMATOLOGY

## 2022-07-07 PROCEDURE — 1101F PR PT FALLS ASSESS DOC 0-1 FALLS W/OUT INJ PAST YR: ICD-10-PCS | Mod: CPTII,S$GLB,, | Performed by: DERMATOLOGY

## 2022-07-07 PROCEDURE — 99999 PR PBB SHADOW E&M-EST. PATIENT-LVL III: CPT | Mod: PBBFAC,,, | Performed by: DERMATOLOGY

## 2022-07-07 NOTE — PROGRESS NOTES
Subjective:       Patient ID:  Raymundo Restrepo is a 67 y.o. male who presents for   Chief Complaint   Patient presents with    Skin Check     Patient present for UBSC, declines full body. LOV 9/24/19 by Tamara Moody MD.    C/o lesions to L hand (2), Lip, R upper hairline, and scalp x months. Treated w/ 5fu didn't help.    Derm Hx: high risk for development of skin cancer due to immunosuppression s/p kidney transplant 2010, maintained on Prograf and Cellcept.   R preauricular cheek-superior-HAK-treating with Efudex  R preauricular cheek-inferior-SCCIS treated by Dr Guzman  L neck-SCCIS  L posterior neck-SCCIS  L dorsal wrist-SCCIS treated by ED&C      Review of Systems   Skin: Positive for activity-related sunscreen use. Negative for daily sunscreen use and wears hat.   Hematologic/Lymphatic: Bruises/bleeds easily.        Objective:    Physical Exam   Constitutional: He appears well-developed and well-nourished.   Neurological: He is alert and oriented to person, place, and time.   Psychiatric: He has a normal mood and affect.   Skin:   Areas Examined (abnormalities noted in diagram):   Scalp / Hair Palpated and Inspected  Head / Face Inspection Performed  Neck Inspection Performed  Chest / Axilla Inspection Performed  Abdomen Inspection Performed  Back Inspection Performed  RUE Inspected  LUE Inspection Performed                   Diagram Legend     Erythematous scaling macule/papule c/w actinic keratosis       Vascular papule c/w angioma      Pigmented verrucoid papule/plaque c/w seborrheic keratosis      Yellow umbilicated papule c/w sebaceous hyperplasia      Irregularly shaped tan macule c/w lentigo     1-2 mm smooth white papules consistent with Milia      Movable subcutaneous cyst with punctum c/w epidermal inclusion cyst      Subcutaneous movable cyst c/w pilar cyst      Firm pink to brown papule c/w dermatofibroma      Pedunculated fleshy papule(s) c/w skin tag(s)      Evenly pigmented macule c/w  junctional nevus     Mildly variegated pigmented, slightly irregular-bordered macule c/w mildly atypical nevus      Flesh colored to evenly pigmented papule c/w intradermal nevus       Pink pearly papule/plaque c/w basal cell carcinoma      Erythematous hyperkeratotic cursted plaque c/w SCC      Surgical scar with no sign of skin cancer recurrence      Open and closed comedones      Inflammatory papules and pustules      Verrucoid papule consistent consistent with wart     Erythematous eczematous patches and plaques     Dystrophic onycholytic nail with subungual debris c/w onychomycosis     Umbilicated papule    Erythematous-base heme-crusted tan verrucoid plaque consistent with inflamed seborrheic keratosis     Erythematous Silvery Scaling Plaque c/w Psoriasis     See annotation                        Assessment / Plan:      Pathology Orders:     Normal Orders This Visit    Specimen to Pathology, Dermatology     Comments:    Number of Specimens:->5  ------------------------->-------------------------  Spec 1 Procedure:->Biopsy  Spec 1 Clinical Impression:->cutaneous horn, very tender -  r/o scc vs HAK  Spec 1 Source:->vertex  ------------------------->-------------------------  Spec 2 Procedure:->Biopsy  Spec 2 Clinical Impression:->recurrent lesion, previously  bx'd c/w HAK treated with 5FU, r/o Moran's  Spec 2 Source:->R preauricular cheek, superior  ------------------------->-------------------------  Spec 3 Procedure:->Biopsy  Spec 3 Clinical Impression:->AK r/o Dean's  Spec 3 Source:->L preauricular cheek  ------------------------->-------------------------  Spec 4 Procedure:->Biopsy  Spec 4 Clinical Impression:->r/o SCC vs ISK  Spec 4 Source:->L ear  ------------------------->-------------------------  Spec 5 Procedure:->Biopsy  Spec 5 Clinical Impression:->BCC  Spec 5 Source:->L upper lip    Questions:    Procedure Type: Dermatology and skin neoplasms    Number of Specimens: 5    ------------------------:  -------------------------    Spec 1 Procedure: Biopsy    Spec 1 Clinical Impression: cutaneous horn, very tender - r/o scc vs HAK    Spec 1 Source: vertex    ------------------------: -------------------------    Spec 2 Procedure: Biopsy    Spec 2 Clinical Impression: recurrent lesion, previously bx'd c/w HAK treated with 5FU, r/o Moran's    Spec 2 Source: R preauricular cheek, superior    ------------------------: -------------------------    Spec 3 Procedure: Biopsy    Spec 3 Clinical Impression: AK r/o Moran's    Spec 3 Source: L preauricular cheek    ------------------------: -------------------------    Spec 4 Procedure: Biopsy    Spec 4 Clinical Impression: r/o SCC vs ISK    Spec 4 Source: L ear    ------------------------: -------------------------    Spec 5 Procedure: Biopsy    Spec 5 Clinical Impression: BCC    Spec 5 Source: L upper lip    Release to patient:         Neoplasm of uncertain behavior of skin  -     Specimen to Pathology, Dermatology  - Discussed diagnosis with patient and explained uncertain nature of condition, including differential DDX.   - Discussed treatment options (biopsy, close monitoring) with patient, including the risks and benefits of each. Patient opted to pursue biopsy.  - Shave Biopsy Procedure Note: Discussed procedure with patient/patient's guardian including risks and benefits as well as treatment alternatives. Risks of procedure include pain, bleeding, infection, post-inflammatory pigmentary alteration, scar, recurrence. Patient informed that the purpose of a biopsy is sampling of condition in question rather than removal in entirety; further treatment may be necessary. Verbal consent obtained. Area to be biopsied marked and cleansed with alcohol. Local anesthesia achieved by injecting approximately 1 cc of 1% lidocaine with epinephrine. Five shave biopsies performed using a double edge razor blade; specimens submitted to pathology. Hemostasis achieved with aluminum  chloride. Petroleum jelly and bandage applied to wound. Patient tolerated procedure well. After-visit wound care instructions reviewed and provided in writing.     AK (actinic keratosis)  - Discussed diagnosis, etiology, and precancerous nature of condition.   - Cryosurgery Procedure Note: Discussed procedure with patient/patient's guardian including risks and benefits as well as treatment alternatives. Risks of procedure include pain, itching, swelling, redness, blistering, crusting, wound formation, post-inflammatory pigmentary alteration, scar, recurrence. Verbal consent obtained. LN2 cryosurgery performed to 7 lesion(s). Patient tolerated procedure well. After-visit wound care instructions reviewed and provided in writing.      Multiple benign nevi  - Discussed diagnosis, etiology, and benign-nature of condition.  - Reassured; no lesions suspicious for malignancy noted on exam today.   - Recommended routine self examination of skin. Discussed the ABCDEs of melanoma and ugly duckling sign.   - Recommended daily sun protection, including the use of OTC broad-spectrum sunscreen (SPF 30 or greater) and sun-protective clothing.      Lentigines  - Benign; reassured treatment not necessary.   - Recommended daily sun protection, including the use of OTC broad-spectrum sunscreen (SPF 30 or greater) and sun-protective clothing.       SK (seborrheic keratosis)  Cherry angioma  Sebaceous hyperplasia  - Benign; reassured treatment not necessary.      Hx of nonmelanoma skin cancer  Immunocompromised state  Screening exam for skin cancer  - Upper body skin examination performed today.  - Findings listed above.   - Discussed increased risk of skin cancer with immunosuppression.   - Recommended routine self examination of skin.    - Recommended daily sun protection, including the use of OTC broad-spectrum sunscreen (SPF 30 or greater) and sun-protective clothing.             Follow up for pending pathology.

## 2022-07-08 NOTE — PATIENT INSTRUCTIONS
Shave Biopsy Wound Care    Your doctor has performed a shave biopsy today.  A band aid and vaseline ointment has been placed over the site.  This should remain in place for NO LONGER THAN 48 hours.  It is fine to remove the bandaid after 24 hours, if the area is no longer bleeding. It is recommended that you keep the area dry (do not wet)) for the first 24 hours.  After 24 hours, wash the area with warm soap and water and apply Vaseline jelly.  Many patients prefer to use Neosporin or Bacitracin ointment.  This is acceptable; however, know that you can develop an allergy to this medication even if you have used it safely for years.  It is important to keep the area moist.  Letting it dry out and get air slows healing time, and will worsen the scar.        If you notice increasing redness, tenderness, pain, or yellow drainage at the biopsy site, please notify your doctor.  These are signs of an infection.    If your biopsy site is bleeding, apply firm pressure for 15 minutes straight.  Repeat for another 15 minutes, if it is still bleeding.   If the surgical site continues to bleed, then please contact your doctor.      For [facility] users:   You will receive your biopsy results in [facility] as soon as they are available. Please be assured that your physician/provider will review your results and will then determine what further treatment, evaluation, or planning is required. You should be contacted by your physician's/provider's office within 5 business days of receiving your results; If not, please reach out to directly. This is one more way [facility] is putting you first.     [facility] [place] [address]/ [phone] FAX/ [email or web address]     CRYOSURGERY      Your doctor has used a method called cryosurgery to treat your skin condition. Cryosurgery refers to the use of very cold substances to treat a variety of skin conditions such as warts, pre-skin cancers, molluscum contagiosum,  sun spots, and several benign growths. The substance we use in cryosurgery is liquid nitrogen and is so cold (-195 degrees Celsius) that is burns when administered.     Following treatment in the office, the skin may immediately burn and become red. You may find the area around the lesion is affected as well. It is sometimes necessary to treat not only the lesion, but a small area of the surrounding normal skin to achieve a good response.     A blister, and even a blood filled blister, may form after treatment.   This is a normal response. If the blister is painful, it is acceptable to sterilize a needle and with rubbing alcohol and gently pop the blister. It is important that you gently wash the area with soap and warm water as the blister fluid may contain wart virus if a wart was treated. Do no remove the roof of the blister.     The area treated can take anywhere from 1-3 weeks to heal. Healing time depends on the kind skin lesion treated, the location, and how aggressively the lesion was treated. It is recommended that the areas treated are covered with Vaseline or bacitracin ointment and a band-aid. If a band-aid is not practical, just ointment applied several times per day will do. Keeping these areas moist will speed the healing time.    Treatment with liquid nitrogen can leave a scar. In dark skin, it may be a light or dark scar, in light skin it may be a white or pink scar. These will generally fade with time, but may never go away completely.     If you have any concerns after your treatment, please feel free to call the office.       [facility] [place] [address]/ [phone] FAX/ [email or web address]

## 2022-07-18 ENCOUNTER — LAB VISIT (OUTPATIENT)
Dept: LAB | Facility: HOSPITAL | Age: 67
End: 2022-07-18
Attending: FAMILY MEDICINE
Payer: MEDICARE

## 2022-07-18 ENCOUNTER — OFFICE VISIT (OUTPATIENT)
Dept: INFECTIOUS DISEASES | Facility: CLINIC | Age: 67
End: 2022-07-18
Payer: MEDICARE

## 2022-07-18 VITALS
WEIGHT: 175.94 LBS | DIASTOLIC BLOOD PRESSURE: 73 MMHG | SYSTOLIC BLOOD PRESSURE: 137 MMHG | HEART RATE: 55 BPM | TEMPERATURE: 98 F | BODY MASS INDEX: 28.4 KG/M2

## 2022-07-18 DIAGNOSIS — A04.71 RECURRENT CLOSTRIDIOIDES DIFFICILE DIARRHEA: Primary | ICD-10-CM

## 2022-07-18 DIAGNOSIS — Z11.9 SCREENING EXAMINATION FOR INFECTIOUS DISEASE: ICD-10-CM

## 2022-07-18 PROCEDURE — 4010F ACE/ARB THERAPY RXD/TAKEN: CPT | Mod: CPTII,S$GLB,, | Performed by: CLINICAL NURSE SPECIALIST

## 2022-07-18 PROCEDURE — 87389 HIV-1 AG W/HIV-1&-2 AB AG IA: CPT | Performed by: CLINICAL NURSE SPECIALIST

## 2022-07-18 PROCEDURE — 3044F PR MOST RECENT HEMOGLOBIN A1C LEVEL <7.0%: ICD-10-PCS | Mod: CPTII,S$GLB,, | Performed by: CLINICAL NURSE SPECIALIST

## 2022-07-18 PROCEDURE — 3044F HG A1C LEVEL LT 7.0%: CPT | Mod: CPTII,S$GLB,, | Performed by: CLINICAL NURSE SPECIALIST

## 2022-07-18 PROCEDURE — 1159F PR MEDICATION LIST DOCUMENTED IN MEDICAL RECORD: ICD-10-PCS | Mod: CPTII,S$GLB,, | Performed by: CLINICAL NURSE SPECIALIST

## 2022-07-18 PROCEDURE — 36415 COLL VENOUS BLD VENIPUNCTURE: CPT | Performed by: CLINICAL NURSE SPECIALIST

## 2022-07-18 PROCEDURE — 87340 HEPATITIS B SURFACE AG IA: CPT | Performed by: CLINICAL NURSE SPECIALIST

## 2022-07-18 PROCEDURE — 99417 PROLNG OP E/M EACH 15 MIN: CPT | Mod: S$GLB,,, | Performed by: CLINICAL NURSE SPECIALIST

## 2022-07-18 PROCEDURE — 99417 PR PROLONGED SVC, OUTPT, W/WO DIRECT PT CONTACT,  EA ADDTL 15 MIN: ICD-10-PCS | Mod: S$GLB,,, | Performed by: CLINICAL NURSE SPECIALIST

## 2022-07-18 PROCEDURE — 1159F MED LIST DOCD IN RCRD: CPT | Mod: CPTII,S$GLB,, | Performed by: CLINICAL NURSE SPECIALIST

## 2022-07-18 PROCEDURE — 3008F PR BODY MASS INDEX (BMI) DOCUMENTED: ICD-10-PCS | Mod: CPTII,S$GLB,, | Performed by: CLINICAL NURSE SPECIALIST

## 2022-07-18 PROCEDURE — 86790 VIRUS ANTIBODY NOS: CPT | Performed by: CLINICAL NURSE SPECIALIST

## 2022-07-18 PROCEDURE — 86704 HEP B CORE ANTIBODY TOTAL: CPT | Performed by: CLINICAL NURSE SPECIALIST

## 2022-07-18 PROCEDURE — 86592 SYPHILIS TEST NON-TREP QUAL: CPT | Performed by: CLINICAL NURSE SPECIALIST

## 2022-07-18 PROCEDURE — 3008F BODY MASS INDEX DOCD: CPT | Mod: CPTII,S$GLB,, | Performed by: CLINICAL NURSE SPECIALIST

## 2022-07-18 PROCEDURE — 4010F PR ACE/ARB THEARPY RXD/TAKEN: ICD-10-PCS | Mod: CPTII,S$GLB,, | Performed by: CLINICAL NURSE SPECIALIST

## 2022-07-18 PROCEDURE — 99215 PR OFFICE/OUTPT VISIT, EST, LEVL V, 40-54 MIN: ICD-10-PCS | Mod: S$GLB,,, | Performed by: CLINICAL NURSE SPECIALIST

## 2022-07-18 PROCEDURE — 99999 PR PBB SHADOW E&M-EST. PATIENT-LVL V: CPT | Mod: PBBFAC,,, | Performed by: CLINICAL NURSE SPECIALIST

## 2022-07-18 PROCEDURE — 3078F PR MOST RECENT DIASTOLIC BLOOD PRESSURE < 80 MM HG: ICD-10-PCS | Mod: CPTII,S$GLB,, | Performed by: CLINICAL NURSE SPECIALIST

## 2022-07-18 PROCEDURE — 3075F PR MOST RECENT SYSTOLIC BLOOD PRESS GE 130-139MM HG: ICD-10-PCS | Mod: CPTII,S$GLB,, | Performed by: CLINICAL NURSE SPECIALIST

## 2022-07-18 PROCEDURE — 99999 PR PBB SHADOW E&M-EST. PATIENT-LVL V: ICD-10-PCS | Mod: PBBFAC,,, | Performed by: CLINICAL NURSE SPECIALIST

## 2022-07-18 PROCEDURE — 3075F SYST BP GE 130 - 139MM HG: CPT | Mod: CPTII,S$GLB,, | Performed by: CLINICAL NURSE SPECIALIST

## 2022-07-18 PROCEDURE — 86706 HEP B SURFACE ANTIBODY: CPT | Performed by: CLINICAL NURSE SPECIALIST

## 2022-07-18 PROCEDURE — 86803 HEPATITIS C AB TEST: CPT | Performed by: CLINICAL NURSE SPECIALIST

## 2022-07-18 PROCEDURE — 99215 OFFICE O/P EST HI 40 MIN: CPT | Mod: S$GLB,,, | Performed by: CLINICAL NURSE SPECIALIST

## 2022-07-18 PROCEDURE — 3078F DIAST BP <80 MM HG: CPT | Mod: CPTII,S$GLB,, | Performed by: CLINICAL NURSE SPECIALIST

## 2022-07-18 NOTE — PATIENT INSTRUCTIONS
1. Phone call follow up in 2 weeks  2. May experience gas, diarrhea, abdominal discomfort, temp <100.4 over next 24-48 hours  3. If any problems, concerns, or return of diarrhea call ID research team at [phone]

## 2022-07-18 NOTE — PROGRESS NOTES
Subjective:      Patient ID: Raymundo Restrepo is a 67 y.o. male.    Chief Complaint:No chief complaint on file.      History of Present Illness    Mr. Raymundo Restrepo is a 67 y.o. male with a past medical history of CAD, PKD,  -donor kidney transplant (2010), Hiatal hernia, Hypertension, Ischemic colitis, Kidney stones, Ruptured cyst of kidney, Skin cancer,TIA, and recurrent C.Diff. He was diagnosed with with most recent episode of C.Diff on 2022. He was treated with a course of Vancomycin. Last dose was 22. Reports having average of 1-2 soft, ribbon like, semi-formed bowel movements a day. Reports that this is consistency of bowel movement he has had since have ischemic colitis after his kidney transplant. Denies watery bowel movements. Complains of nausea with this episode of C.Diff that is managed with Zofran. Denies fever, chills, abdominal pain/cramping.      He was referred by Dr. Peter for recurrent C.Diff for FMT. He was offered and accepted a FMT through the [facility] [id number] (microbiota suspension) assured active treatment program.       Mr. Restrepo comes to clinic today for enema administration. Pre-procedure labs obtained. Procedure discussed and explained to patient. All questions answered, consent obtained. Reports no problems or complaints. Will proceed with FMT today.     Review of Systems   Constitutional: Positive for malaise/fatigue. Negative for chills and fever.   HENT: Positive for hearing loss. Negative for congestion, ear pain and sore throat.    Eyes: Negative for redness.   Cardiovascular: Negative for chest pain.   Respiratory: Positive for cough and sputum production. Negative for shortness of breath.    Skin: Positive for itching.   Musculoskeletal: Positive for back pain.   Gastrointestinal: Positive for heartburn and nausea. Negative for diarrhea and vomiting.   Genitourinary: Positive for flank pain. Negative for hematuria.   Neurological: Negative for dizziness and  light-headedness.   Psychiatric/Behavioral: Negative for altered mental status.     Objective:   Physical Exam  Vitals reviewed.   Constitutional:       General: He is not in acute distress.     Appearance: He is not ill-appearing.   HENT:      Head: Normocephalic and atraumatic.      Right Ear: External ear normal.      Left Ear: External ear normal.      Nose: Nose normal. No congestion.      Mouth/Throat:      Pharynx: Oropharynx is clear.   Eyes:      General:         Right eye: No discharge.         Left eye: No discharge.      Conjunctiva/sclera: Conjunctivae normal.   Cardiovascular:      Rate and Rhythm: Normal rate and regular rhythm.      Pulses: Normal pulses.      Heart sounds: Normal heart sounds.   Pulmonary:      Effort: Pulmonary effort is normal. No respiratory distress.   Abdominal:      General: Bowel sounds are increased. There is no distension.      Palpations: Abdomen is soft.      Tenderness: There is no abdominal tenderness. There is no guarding.   Musculoskeletal:         General: No swelling. Normal range of motion.      Cervical back: Normal range of motion.   Skin:     General: Skin is warm.      Findings: Lesion present.   Neurological:      General: No focal deficit present.      Mental Status: He is alert and oriented to person, place, and time.   Psychiatric:         Mood and Affect: Mood normal.         Behavior: Behavior normal.       Vitals:    07/18/22 1126 07/18/22 1204   BP: 126/74 137/73   Pulse: (!) 53 (!) 55   Temp: 98.5 °F (36.9 °C) 97.8 °F (36.6 °C)   Weight: 79.8 kg (175 lb 14.8 oz)        Assessment:       1. Recurrent Clostridioides difficile diarrhea        Consent for treatment reviewed with patient who was given time to ask questions. Consent signed by patient (see media tab). Rebiotix [id number] microbiota enema (Batch#:[id number]) administrated per product protocol. Enema bag completely infused over 2 minutes without difficulty. Patient tolerated well. Patient had  scant leakage and was able to retain 99% of enema lying in left lateral decubitus for 15 minutes. Post enema vital signs no change from pre-enema. Patient without complaint, will follow up in 2 weeks.   Plan:       1. Phone call follow up in 2 weeks  2. Discussed with patient that he may experience gas, diarrhea, abdominal discomfort, temp <100.4 over next 24-48 hours  3. If any problems, concerns, or return of diarrhea call ID research team at [phone]

## 2022-07-19 LAB
FINAL PATHOLOGIC DIAGNOSIS: NORMAL
GROSS: NORMAL
HAV IGG SER QL IA: POSITIVE
HBV CORE AB SERPL QL IA: NEGATIVE
HBV SURFACE AB SER-ACNC: NEGATIVE M[IU]/ML
HBV SURFACE AG SERPL QL IA: NEGATIVE
HCV AB SERPL QL IA: NEGATIVE
HIV 1+2 AB+HIV1 P24 AG SERPL QL IA: NEGATIVE
Lab: NORMAL
MICROSCOPIC EXAM: NORMAL
RPR SER QL: NORMAL

## 2022-07-20 ENCOUNTER — TELEPHONE (OUTPATIENT)
Dept: DERMATOLOGY | Facility: CLINIC | Age: 67
End: 2022-07-20
Payer: MEDICARE

## 2022-07-20 ENCOUNTER — PATIENT MESSAGE (OUTPATIENT)
Dept: INFECTIOUS DISEASES | Facility: CLINIC | Age: 67
End: 2022-07-20
Payer: MEDICARE

## 2022-07-20 NOTE — PROGRESS NOTES
1. Skin,  vertex, shave biopsy:   -FOCAL SQUAMOUS CELL CARCINOMA IN-SITU, ARISING IN A BACKGROUND OF A   HYPERTROPHIC ACTINIC KERATOSIS WITH FOLLICULAR INVOLVEMENT, EXCISED IN THE   PLANES OF THE SECTIONS EXAMINED, see comment   Comment:  The follicular involvement of the actinic keratosis extend to the   base of the specimen.     2.  Skin, right preauricular cheek, shave biopsy:   -CRATERIFORM CYSTIC PROLIFERATION, see comment   Comment:  This lesion is multi cystic with underlying inflammation and is   reminiscent of a resolving keratoacanthoma.  However the epithelium of the   multicystic lesion is  the epithelium seen in follicular cyst of the   infundibulum and with mild keratinocytic atypiat.   The lesion is transected   at the base, a more invasive component cannot be ruled out, correlate with   clinical findings.     3. Skin,  left preauricular cheek, shave biopsy:   -BASAL CELL CARCINOMA, SUPERFICIAL AND NODULAR, EXTENDING TO THE DEEP AND   PERIPHERAL MARGINS   This lesion is skin cancer. You will be contacted regarding treatment.   4. Skin,  left ear, shave biopsy:   - BOWEN'S DISEASE (SQUAMOUS CELL CARCINOMA IN-SITU), AT LEAST, WITH   FOLLICULAR INVOLVEMENT, EXTENDING TO THE BASE OF THE SPECIMEN, see comment   Comment:  The lesion is transected at the base.  There are a couple of small   dermal islands with keratinocyte atypia that appear to be connected to the   follicular epithelium in sections, however this may represent a more invasive   carcinoma.  The squamous cell carcinoma in situ also extends to the   peripheral margins.     5. Skin, left upper lip, shave biopsy:   -SQUAMOUS CELL CARCINOMA, INVASIVE, MODERATELY DIFFERENTIATED, EXTENDING TO   THE BASE OF THE SPECIMEN   This lesion is skin cancer. You will be contacted regarding treatment.     Pt informed of plan via portal message:   #1 - re-evaluate, likely EDC followed by Efudex. Will need hair clippers for procedure. Please schedule next  available 30 min procedure.   #2 - re-evaluate at above #1 procedure visit.   #3-5 - Mohs referral give location, size, immunosuppressed pt. Path above, photo in chart.

## 2022-07-20 NOTE — TELEPHONE ENCOUNTER
Called patient and made first available appt due to Dr. watters sending high priority diagnosis          ----- Message from Caroline Watters MD sent at 7/20/2022  9:55 AM CDT -----  1. Skin,  vertex, shave biopsy:   -FOCAL SQUAMOUS CELL CARCINOMA IN-SITU, ARISING IN A BACKGROUND OF A   HYPERTROPHIC ACTINIC KERATOSIS WITH FOLLICULAR INVOLVEMENT, EXCISED IN THE   PLANES OF THE SECTIONS EXAMINED, see comment   Comment:  The follicular involvement of the actinic keratosis extend to the   base of the specimen.     2.  Skin, right preauricular cheek, shave biopsy:   -CRATERIFORM CYSTIC PROLIFERATION, see comment   Comment:  This lesion is multi cystic with underlying inflammation and is   reminiscent of a resolving keratoacanthoma.  However the epithelium of the   multicystic lesion is  the epithelium seen in follicular cyst of the   infundibulum and with mild keratinocytic atypiat.   The lesion is transected   at the base, a more invasive component cannot be ruled out, correlate with   clinical findings.     3. Skin,  left preauricular cheek, shave biopsy:   -BASAL CELL CARCINOMA, SUPERFICIAL AND NODULAR, EXTENDING TO THE DEEP AND   PERIPHERAL MARGINS   This lesion is skin cancer. You will be contacted regarding treatment.   4. Skin,  left ear, shave biopsy:   - BOWEN'S DISEASE (SQUAMOUS CELL CARCINOMA IN-SITU), AT LEAST, WITH   FOLLICULAR INVOLVEMENT, EXTENDING TO THE BASE OF THE SPECIMEN, see comment   Comment:  The lesion is transected at the base.  There are a couple of small   dermal islands with keratinocyte atypia that appear to be connected to the   follicular epithelium in sections, however this may represent a more invasive   carcinoma.  The squamous cell carcinoma in situ also extends to the   peripheral margins.     5. Skin, left upper lip, shave biopsy:   -SQUAMOUS CELL CARCINOMA, INVASIVE, MODERATELY DIFFERENTIATED, EXTENDING TO   THE BASE OF THE SPECIMEN   This lesion is skin cancer. You will be  contacted regarding treatment.     Pt informed of plan via portal message:   #1 - re-evaluate, likely EDC followed by Efudex. Will need hair clippers for procedure. Please schedule next available 30 min procedure.   #2 - re-evaluate at above #1 procedure visit.   #3-5 - Mohs referral give location, size, immunosuppressed pt. Path above, photo in chart.      POST-OP DIAGNOSIS:  Colon cancer 05-Dec-2019 14:28:17  Anjana Ortiz POST-OP DIAGNOSIS:  Uterine cancer 05-Dec-2019 14:54:32  Nina Farley

## 2022-07-20 NOTE — TELEPHONE ENCOUNTER
with direct phone number to call and make consult appt          ----- Message from Caroline Watters MD sent at 7/20/2022  9:55 AM CDT -----  1. Skin,  vertex, shave biopsy:   -FOCAL SQUAMOUS CELL CARCINOMA IN-SITU, ARISING IN A BACKGROUND OF A   HYPERTROPHIC ACTINIC KERATOSIS WITH FOLLICULAR INVOLVEMENT, EXCISED IN THE   PLANES OF THE SECTIONS EXAMINED, see comment   Comment:  The follicular involvement of the actinic keratosis extend to the   base of the specimen.     2.  Skin, right preauricular cheek, shave biopsy:   -CRATERIFORM CYSTIC PROLIFERATION, see comment   Comment:  This lesion is multi cystic with underlying inflammation and is   reminiscent of a resolving keratoacanthoma.  However the epithelium of the   multicystic lesion is  the epithelium seen in follicular cyst of the   infundibulum and with mild keratinocytic atypiat.   The lesion is transected   at the base, a more invasive component cannot be ruled out, correlate with   clinical findings.     3. Skin,  left preauricular cheek, shave biopsy:   -BASAL CELL CARCINOMA, SUPERFICIAL AND NODULAR, EXTENDING TO THE DEEP AND   PERIPHERAL MARGINS   This lesion is skin cancer. You will be contacted regarding treatment.   4. Skin,  left ear, shave biopsy:   - BOWEN'S DISEASE (SQUAMOUS CELL CARCINOMA IN-SITU), AT LEAST, WITH   FOLLICULAR INVOLVEMENT, EXTENDING TO THE BASE OF THE SPECIMEN, see comment   Comment:  The lesion is transected at the base.  There are a couple of small   dermal islands with keratinocyte atypia that appear to be connected to the   follicular epithelium in sections, however this may represent a more invasive   carcinoma.  The squamous cell carcinoma in situ also extends to the   peripheral margins.     5. Skin, left upper lip, shave biopsy:   -SQUAMOUS CELL CARCINOMA, INVASIVE, MODERATELY DIFFERENTIATED, EXTENDING TO   THE BASE OF THE SPECIMEN   This lesion is skin cancer. You will be contacted regarding treatment.     Pt  informed of plan via portal message:   #1 - re-evaluate, likely EDC followed by Efudex. Will need hair clippers for procedure. Please schedule next available 30 min procedure.   #2 - re-evaluate at above #1 procedure visit.   #3-5 - Mohs referral give location, size, immunosuppressed pt. Path above, photo in chart.

## 2022-07-26 ENCOUNTER — TELEPHONE (OUTPATIENT)
Dept: DERMATOLOGY | Facility: CLINIC | Age: 67
End: 2022-07-26
Payer: MEDICARE

## 2022-07-26 DIAGNOSIS — I10 HYPERTENSION: ICD-10-CM

## 2022-07-26 NOTE — TELEPHONE ENCOUNTER
----- Message from Caroline Watters MD sent at 7/26/2022 11:40 AM CDT -----  2nd request for scheduling EDC and lesion re-evaluation (#1, 2 below). Thanks!

## 2022-07-26 NOTE — TELEPHONE ENCOUNTER
Spoke w/ pt. Informed pt about results and recommendations per provider. pt verbalized understanding.    appt made for 8/15/22

## 2022-08-03 ENCOUNTER — TELEPHONE (OUTPATIENT)
Dept: INFECTIOUS DISEASES | Facility: CLINIC | Age: 67
End: 2022-08-03
Payer: MEDICARE

## 2022-08-03 DIAGNOSIS — Z86.19 HISTORY OF CLOSTRIDIUM DIFFICILE COLITIS: Primary | ICD-10-CM

## 2022-08-03 DIAGNOSIS — R10.9 ABDOMINAL CRAMPING: ICD-10-CM

## 2022-08-03 RX ORDER — DICYCLOMINE HYDROCHLORIDE 20 MG/1
20 TABLET ORAL EVERY 6 HOURS PRN
Qty: 120 TABLET | Refills: 0 | Status: SHIPPED | OUTPATIENT
Start: 2022-08-03 | End: 2022-09-02

## 2022-08-03 NOTE — TELEPHONE ENCOUNTER
Infectious Disease Follow up:    Called Mr. Restrepo for 2 week follow up post FMT with [id number] on 7/18/22 for recurrent C.Diff. Reports that he is having good days and bad days. Today is not a good day. Will have days where he has multiple loose (not watery) stools and then days where only has 1 or 2 bowel movements. Also, reports having a lot of abdominal cramping and discomfort. He does not feel this is a return of C.Diff. States he has experienced it enough to know the symptoms and is not having them. Reviewed that many patients have post-infectious IBS symptoms for many months after resolution of recurrent C.Diff and to monitor food intake for what exacerbates diarrhea. Discussed that may take Imodium to help with symptoms, but if develops watery diarrhea for >48 hours or fever to stop taking and call ID to be tested. Also, discussed use of Bentyl for cramps and low dose Elavil. Will try Bentyl at this time.     Discussed with patient that will call again in 6 weeks and if no return of C.Diff will consider this a treatment success. If any problems, concerns or new problems before that time please call ID clinic and number provided.     1. If diarrhea returns notify ID clinic  2. Follow up phone call in 6 weeks (8 weeks post-enema administration)  3. Return to clinic as needed for problems/concerns  4. Bentyl 20mg po q6 as needed for abdominal cramping or discomfort

## 2022-08-15 ENCOUNTER — PROCEDURE VISIT (OUTPATIENT)
Dept: DERMATOLOGY | Facility: CLINIC | Age: 67
End: 2022-08-15
Payer: MEDICARE

## 2022-08-15 DIAGNOSIS — L57.0 AK (ACTINIC KERATOSIS): ICD-10-CM

## 2022-08-15 DIAGNOSIS — D48.5 NEOPLASM OF UNCERTAIN BEHAVIOR OF SKIN: ICD-10-CM

## 2022-08-15 DIAGNOSIS — D04.4 SQUAMOUS CELL CARCINOMA IN SITU (SCCIS) OF SCALP: Primary | ICD-10-CM

## 2022-08-15 PROCEDURE — 88305 TISSUE EXAM BY PATHOLOGIST: ICD-10-PCS | Mod: 26,,, | Performed by: DERMATOLOGY

## 2022-08-15 PROCEDURE — 99499 UNLISTED E&M SERVICE: CPT | Mod: S$GLB,,, | Performed by: DERMATOLOGY

## 2022-08-15 PROCEDURE — 88305 TISSUE EXAM BY PATHOLOGIST: CPT | Mod: 59 | Performed by: DERMATOLOGY

## 2022-08-15 PROCEDURE — 11102 TANGNTL BX SKIN SINGLE LES: CPT | Mod: 59,S$GLB,, | Performed by: DERMATOLOGY

## 2022-08-15 PROCEDURE — 88305 TISSUE EXAM BY PATHOLOGIST: CPT | Mod: 26,,, | Performed by: DERMATOLOGY

## 2022-08-15 PROCEDURE — 17272 PR DESTR MALIG SCAL,NCK,HAND 1.1-2 CM: ICD-10-PCS | Mod: S$GLB,,, | Performed by: DERMATOLOGY

## 2022-08-15 PROCEDURE — 11102 PR TANGENTIAL BIOPSY, SKIN, SINGLE LESION: ICD-10-PCS | Mod: 59,S$GLB,, | Performed by: DERMATOLOGY

## 2022-08-15 PROCEDURE — 99499 NO LOS: ICD-10-PCS | Mod: S$GLB,,, | Performed by: DERMATOLOGY

## 2022-08-15 PROCEDURE — 17272 DSTR MAL LES S/N/H/F/G 1.1-2: CPT | Mod: S$GLB,,, | Performed by: DERMATOLOGY

## 2022-08-15 PROCEDURE — 17271 PR DESTR MALIG SCAL,NCK,HAND 0.6-1 CM: ICD-10-PCS | Mod: 59,S$GLB,, | Performed by: DERMATOLOGY

## 2022-08-15 PROCEDURE — 17271 DSTR MAL LES S/N/H/F/G 0.6-1: CPT | Mod: 59,S$GLB,, | Performed by: DERMATOLOGY

## 2022-08-15 RX ORDER — FLUOROURACIL 50 MG/G
CREAM TOPICAL
Qty: 40 G | Refills: 1 | Status: SHIPPED | OUTPATIENT
Start: 2022-08-15 | End: 2023-10-27 | Stop reason: SDUPTHER

## 2022-08-15 NOTE — PROGRESS NOTES
Squamous cell carcinoma in situ (SCCIS) of scalp  -     fluorouraciL (EFUDEX) 5 % cream; AAA bid x 2-4 weeks  Dispense: 40 g; Refill: 1  1. Skin,  vertex, shave biopsy:   -FOCAL SQUAMOUS CELL CARCINOMA IN-SITU, ARISING IN A BACKGROUND OF A   HYPERTROPHIC ACTINIC KERATOSIS WITH FOLLICULAR INVOLVEMENT, EXCISED IN THE   PLANES OF THE SECTIONS EXAMINED, see comment   Comment:  The follicular involvement of the actinic keratosis extend to the   base of the specimen.     - Discussed diagnosis, etiology, and treatment options.  - Discussed malignant diagnosis with patient and need for definitive treatment.   - Discussed treatment options with patient, including the risks and benefits of each. Patient opted for EDC.   - Electrodessication and Curettage Procedure Note: Discussed procedure with patient/patient's guardian including risks and benefits as well as treatment alternatives. Risks of procedure include pain, bleeding, infection, post-inflammatory pigmentary alteration, scar, recurrence. Verbal consent obtained. Area to be treated marked and cleansed with alcohol. Local anesthesia achieved by injection of 1% lidocaine with epinephrine. Curettage and desiccation x 3 performed. Lesion size after primary curettage: 1 cm. Petroleum jelly and bandage applied to wound. Patient tolerated procedure well. After-visit wound care instructions reviewed and provided in writing. F/u 3 months, PRN sooner.   - Adjuvant Efudex BID x 2-4 weeks.   - Counseled on potential SE of medication(s) and instructed on use.     Neoplasm of uncertain behavior of skin  -     Specimen to Pathology, Dermatology   2.  Skin, right preauricular cheek, shave biopsy:   -CRATERIFORM CYSTIC PROLIFERATION, see comment   Comment:  This lesion is multi cystic with underlying inflammation and is   reminiscent of a resolving keratoacanthoma.  However the epithelium of the   multicystic lesion is  the epithelium seen in follicular cyst of the   infundibulum and  with mild keratinocytic atypia.   The lesion is transected   at the base, a more invasive component cannot be ruled out, correlate with   clinical findings.     - Discussed diagnosis with patient and explained uncertain nature of condition, including differential DDX.   - Discussed treatment options (biopsy, close monitoring) with patient, including the risks and benefits of each. Patient opted to pursue biopsy.  - Shave Biopsy Procedure Note: Discussed procedure with patient/patient's guardian including risks and benefits as well as treatment alternatives. Risks of procedure include pain, bleeding, infection, post-inflammatory pigmentary alteration, scar, recurrence. Patient informed that the purpose of a biopsy is sampling of condition in question rather than removal in entirety; further treatment may be necessary. Verbal consent obtained. Area to be biopsied marked and cleansed with alcohol. Local anesthesia achieved by injecting approximately 1 cc of 1% lidocaine with epinephrine. One shave biopsy performed using a double edge razor blade; specimen submitted to pathology. Hemostasis achieved with aluminum chloride. Petroleum jelly and bandage applied to wound. Patient tolerated procedure well. After-visit wound care instructions reviewed and provided in writing.   - Plan for Mohs pending biopsy. Hx of Efudex failure.     Neoplasm of uncertain behavior of skin  -     Specimen to Pathology, Dermatology    - Discussed diagnosis with patient and explained suspicious nature of condition, including ddx.   - Discussed treatment options (biopsy +/- EDC, closing monitoring) with patient, including the risks and benefits of each. Patient opted to pursue biopsy followed by EDC  - Shave Biopsy Procedure Note: Discussed procedure with patient/patient's guardian including risks and benefits as well as treatment alternatives. Risks of procedure include pain, bleeding, infection, post-inflammatory pigmentary alteration, scar,  recurrence. Patient informed that the purpose of a biopsy is sampling of condition in question rather than removal in entirety; further treatment may be necessary. Verbal consent obtained. Area to be biopsied marked and cleansed with alcohol. Local anesthesia achieved by injecting approximately 1 cc of 1% lidocaine with epinephrine. A double edge razor blade used for biopsy; specimen submitted to pathology. Patient tolerated procedure well.   FOLLOWED IMMEDIATELY BY  - Electrodessication and Curettage Procedure Note: Discussed procedure with patient/patient's guardian including risks and benefits as well as treatment alternatives. Risks of procedure include pain, bleeding, infection, post-inflammatory pigmentary alteration, scar, recurrence. Verbal consent obtained. Curettage and desiccation x 2 performed. Lesion size after primary curettage: 1.3 cm. Petroleum jelly and bandage applied to wound. Patient tolerated procedure well. After-visit wound care instructions reviewed and provided in writing. F/u 3 months, PRN sooner.     AK (actinic keratosis)  -     fluorouraciL (EFUDEX) 5 % cream; AAA bid x 2-4 weeks  Dispense: 40 g; Refill: 1  Field therapy at dorsal hands   - Counseled on potential SE of medication(s) and instructed on use.

## 2022-08-16 NOTE — PATIENT INSTRUCTIONS

## 2022-08-22 ENCOUNTER — OFFICE VISIT (OUTPATIENT)
Dept: DERMATOLOGY | Facility: CLINIC | Age: 67
End: 2022-08-22
Payer: MEDICARE

## 2022-08-22 VITALS
HEART RATE: 70 BPM | WEIGHT: 172 LBS | BODY MASS INDEX: 27.64 KG/M2 | SYSTOLIC BLOOD PRESSURE: 119 MMHG | DIASTOLIC BLOOD PRESSURE: 70 MMHG | HEIGHT: 66 IN

## 2022-08-22 DIAGNOSIS — C44.319 BASAL CELL CARCINOMA OF LEFT CHEEK: ICD-10-CM

## 2022-08-22 DIAGNOSIS — Z94.0 HISTORY OF RENAL TRANSPLANT: ICD-10-CM

## 2022-08-22 DIAGNOSIS — C44.02 SQUAMOUS CELL CARCINOMA, LIP: Primary | ICD-10-CM

## 2022-08-22 DIAGNOSIS — D04.22 SQUAMOUS CELL CARCINOMA IN SITU OF SKIN OF EAR, LEFT: ICD-10-CM

## 2022-08-22 DIAGNOSIS — Z79.899 LONG TERM CURRENT USE OF IMMUNOSUPPRESSIVE DRUG: ICD-10-CM

## 2022-08-22 PROCEDURE — 1159F PR MEDICATION LIST DOCUMENTED IN MEDICAL RECORD: ICD-10-PCS | Mod: CPTII,S$GLB,, | Performed by: DERMATOLOGY

## 2022-08-22 PROCEDURE — 1160F PR REVIEW ALL MEDS BY PRESCRIBER/CLIN PHARMACIST DOCUMENTED: ICD-10-PCS | Mod: CPTII,S$GLB,, | Performed by: DERMATOLOGY

## 2022-08-22 PROCEDURE — 3078F DIAST BP <80 MM HG: CPT | Mod: CPTII,S$GLB,, | Performed by: DERMATOLOGY

## 2022-08-22 PROCEDURE — 3008F PR BODY MASS INDEX (BMI) DOCUMENTED: ICD-10-PCS | Mod: CPTII,S$GLB,, | Performed by: DERMATOLOGY

## 2022-08-22 PROCEDURE — 99999 PR PBB SHADOW E&M-EST. PATIENT-LVL IV: ICD-10-PCS | Mod: PBBFAC,,, | Performed by: DERMATOLOGY

## 2022-08-22 PROCEDURE — 3078F PR MOST RECENT DIASTOLIC BLOOD PRESSURE < 80 MM HG: ICD-10-PCS | Mod: CPTII,S$GLB,, | Performed by: DERMATOLOGY

## 2022-08-22 PROCEDURE — 3074F SYST BP LT 130 MM HG: CPT | Mod: CPTII,S$GLB,, | Performed by: DERMATOLOGY

## 2022-08-22 PROCEDURE — 1101F PR PT FALLS ASSESS DOC 0-1 FALLS W/OUT INJ PAST YR: ICD-10-PCS | Mod: CPTII,S$GLB,, | Performed by: DERMATOLOGY

## 2022-08-22 PROCEDURE — 3288F PR FALLS RISK ASSESSMENT DOCUMENTED: ICD-10-PCS | Mod: CPTII,S$GLB,, | Performed by: DERMATOLOGY

## 2022-08-22 PROCEDURE — 3044F HG A1C LEVEL LT 7.0%: CPT | Mod: CPTII,S$GLB,, | Performed by: DERMATOLOGY

## 2022-08-22 PROCEDURE — 1160F RVW MEDS BY RX/DR IN RCRD: CPT | Mod: CPTII,S$GLB,, | Performed by: DERMATOLOGY

## 2022-08-22 PROCEDURE — 4010F PR ACE/ARB THEARPY RXD/TAKEN: ICD-10-PCS | Mod: CPTII,S$GLB,, | Performed by: DERMATOLOGY

## 2022-08-22 PROCEDURE — 99214 PR OFFICE/OUTPT VISIT, EST, LEVL IV, 30-39 MIN: ICD-10-PCS | Mod: S$GLB,,, | Performed by: DERMATOLOGY

## 2022-08-22 PROCEDURE — 4010F ACE/ARB THERAPY RXD/TAKEN: CPT | Mod: CPTII,S$GLB,, | Performed by: DERMATOLOGY

## 2022-08-22 PROCEDURE — 3008F BODY MASS INDEX DOCD: CPT | Mod: CPTII,S$GLB,, | Performed by: DERMATOLOGY

## 2022-08-22 PROCEDURE — 1101F PT FALLS ASSESS-DOCD LE1/YR: CPT | Mod: CPTII,S$GLB,, | Performed by: DERMATOLOGY

## 2022-08-22 PROCEDURE — 1159F MED LIST DOCD IN RCRD: CPT | Mod: CPTII,S$GLB,, | Performed by: DERMATOLOGY

## 2022-08-22 PROCEDURE — 99214 OFFICE O/P EST MOD 30 MIN: CPT | Mod: S$GLB,,, | Performed by: DERMATOLOGY

## 2022-08-22 PROCEDURE — 99999 PR PBB SHADOW E&M-EST. PATIENT-LVL IV: CPT | Mod: PBBFAC,,, | Performed by: DERMATOLOGY

## 2022-08-22 PROCEDURE — 3044F PR MOST RECENT HEMOGLOBIN A1C LEVEL <7.0%: ICD-10-PCS | Mod: CPTII,S$GLB,, | Performed by: DERMATOLOGY

## 2022-08-22 PROCEDURE — 3074F PR MOST RECENT SYSTOLIC BLOOD PRESSURE < 130 MM HG: ICD-10-PCS | Mod: CPTII,S$GLB,, | Performed by: DERMATOLOGY

## 2022-08-22 PROCEDURE — 1126F AMNT PAIN NOTED NONE PRSNT: CPT | Mod: CPTII,S$GLB,, | Performed by: DERMATOLOGY

## 2022-08-22 PROCEDURE — 1126F PR PAIN SEVERITY QUANTIFIED, NO PAIN PRESENT: ICD-10-PCS | Mod: CPTII,S$GLB,, | Performed by: DERMATOLOGY

## 2022-08-22 PROCEDURE — 3288F FALL RISK ASSESSMENT DOCD: CPT | Mod: CPTII,S$GLB,, | Performed by: DERMATOLOGY

## 2022-08-22 NOTE — PROGRESS NOTES
ALLERGIES:  Iodine and Morphine    CHIEF COMPLAINT:  This 67 y.o. male comes for evaluation for Mohs' Micrographic Surgery, Fresh Tissue Technique, for treatment of a biopsy-proven basal cell carcinoma on the left preauricular and of a biopsy-proven squamous cell carcinoma  on the left ear and left upper lip. Consultation requested by Caroline Watters M.D..    The patient is accompanied to this visit by his wife.    HISTORY OF PRESENT ILLNESS:   Location(s): left preauricular, left ear and left upper lip  Duration: 3 years left pre auricular, left upper lip 1 year and left ear?  or more  Quality: persistent   Context: status post biopsies by Caroline Watters M.D.; path = as below; pathology accession #[id number],  Pathology   Prior Treatment: none    Final Pathologic Diagnosis   Date Value Ref Range Status   07/07/2022   Final    1. Skin,  vertex, shave biopsy:  -FOCAL SQUAMOUS CELL CARCINOMA IN-SITU, ARISING IN A BACKGROUND OF A  HYPERTROPHIC ACTINIC KERATOSIS WITH FOLLICULAR INVOLVEMENT, EXCISED IN THE  PLANES OF THE SECTIONS EXAMINED, see comment  Comment:  The follicular involvement of the actinic keratosis extend to the  base of the specimen.  The lesion is atypical and further treatment may be required.  You will be  contacted by our providers office.  2.  Skin, right preauricular cheek, shave biopsy:  -CRATERIFORM CYSTIC PROLIFERATION, see comment  Comment:  This lesion is multi cystic with underlying inflammation and is  reminiscent of a resolving keratoacanthoma.  However the epithelium of the  multicystic lesion is  the epithelium seen in follicular cyst of the  infundibulum and with mild keratinocytic atypiat.   The lesion is transected  at the base, a more invasive component cannot be ruled out, correlate with  clinical findings.  The lesion is atypical and further treatment may be required.  You will be  contacted by our providers office.  3. Skin,  left preauricular cheek, shave biopsy:  -BASAL  CELL CARCINOMA, SUPERFICIAL AND NODULAR, EXTENDING TO THE DEEP AND  PERIPHERAL MARGINS  This lesion is skin cancer. You will be contacted regarding treatment.  4. Skin,  left ear, shave biopsy:  - BOWEN'S DISEASE (SQUAMOUS CELL CARCINOMA IN-SITU), AT LEAST, WITH  FOLLICULAR INVOLVEMENT, EXTENDING TO THE BASE OF THE SPECIMEN, see comment  Comment:  The lesion is transected at the base.  There are a couple of small  dermal islands with keratinocyte atypia that appear to be connected to the  follicular epithelium in sections, however this may represent a more invasive  carcinoma.  The squamous cell carcinoma in situ also extends to the  peripheral margins.  This lesion is skin cancer. You will be contacted regarding treatment.  5. Skin, left upper lip, shave biopsy:  -SQUAMOUS CELL CARCINOMA, INVASIVE, MODERATELY DIFFERENTIATED, EXTENDING TO  THE BASE OF THE SPECIMEN  This lesion is skin cancer. You will be contacted regarding treatment.       Comment:     Interp By Claudia Tucker M.D., Signed on 07/19/2022 at 15:42            Defibrillator: No  Pacemaker: No  Artificial heart valves: No  Artificial joints: No    REVIEW OF SYSTEMS:   General: general health good  Skin: previous skin cancer(s) Yes   If yes, details:   Relevant other:  No   Cardiovascular:   High Blood Pressure: Yes   Chest Pain:  No   Defibrillator: as above  Pacemaker: as above  Artificial heart valves: as above  Prior Endocarditis: No   Prior Heart Attack/MI: No     If yes, when:   Prior Cardiac Bypass or Stents:  Yes   If yes, when: stent 2008 and replaced in 2010  Mitral Valve Prolapse: No   Relevant other:  No   Respiratory:   Shortness of breath: secondary to heat  Relevant other:  No   Endocrine:   Diabetes:  No   Relevant other:  No   Hem/Lymph:   Taking Prescribed Blood Thinners:  Yes  Plavix  Easy Bleeding:  Yes   Relevant other:  No   Allergy/Immuno: as noted above  Relevant other:  No   GI:   Prior Hepatitis:  Yes      If yes, details:  Hepatitis B  Relevant other:  No   Musculoskeletal:   Artificial joints: as above  Relevant other:  No   Neurologic:   Prior Stroke:  No     If yes, details:   Relevant other:  No   Relevant other info:  Yes  c-diff, had 2 fectal transplant last one in 2022     PAST MEDICAL HISTORY:  Past Medical History:   Diagnosis Date    C. difficile diarrhea     CAD (coronary artery disease), native coronary artery      Stent placed 2007 In-stent restenosis, required a 2nd stent 2010    -donor kidney transplant 2010    Gross hematuria     history of gross hematuria    Hiatal hernia     Hypertension 09/15/2014    Ischemic colitis     Kidney stones     Need for prophylactic immunotherapy     Non-Hodgkin's lymphoma     PKD (polycystic kidney disease)     Recurrent skin cancer     Renal hypertension     Ruptured cyst of kidney     Skin cancer     TIA (transient ischemic attack)     x3, no residual       PAST SURGICAL HISTORY:  Past Surgical History:   Procedure Laterality Date    CARDIAC CATHETERIZATION  2012    COLONOSCOPY N/A 2021    Procedure: Open Biome Colonoscopy Fecal Transplant;  Surgeon: Andrew Peter MD;  Location: [facility] (4TH FLR);  Service: Endoscopy;  Laterality: N/A;  needs 1 hour block, contact isolation, terminal clean after - Dr Peter  No Hold Plavix per Dr Peter / 2 day hold antibx per Dr Peter  [facility] test [facility]    CORONARY STENT PLACEMENT   &     1 cardiac stent    ESOPHAGOGASTRODUODENOSCOPY N/A 2019    Procedure: EGD (ESOPHAGOGASTRODUODENOSCOPY);  Surgeon: Jose G Tafoya MD;  Location: [facility] (2ND FLR);  Service: Endoscopy;  Laterality: N/A;  Plavix held with permission from Dr Kali Koroma.    fecal transplant  2021    KIDNEY TRANSPLANT  2010    rt lower abd        SOCIAL HISTORY:  Dependencies: smoking status as noted below  Social History     Tobacco Use    Smoking status: Never Smoker     Smokeless tobacco: Never Used   Substance Use Topics    Alcohol use: No    Drug use: No       PERTINENT MEDICATIONS:  See medications list.  Current Outpatient Medications on File Prior to Visit   Medication Sig Dispense Refill    acetaminophen (TYLENOL) 500 MG tablet Take 1 tablet (500 mg total) by mouth every 6 (six) hours as needed for Pain.      allopurinoL (ZYLOPRIM) 300 MG tablet Take 300 mg by mouth once daily.      benazepriL (LOTENSIN) 5 MG tablet Take 5 mg by mouth.      carvedilol (COREG) 12.5 MG tablet Take 12.5 mg by mouth 2 (two) times daily.       clopidogreL (PLAVIX) 75 mg tablet Take 1 tablet (75 mg total) by mouth once daily.      dicyclomine (BENTYL) 20 mg tablet Take 1 tablet (20 mg total) by mouth every 6 (six) hours as needed (abdominal cramping). 120 tablet 0    fluorouraciL (EFUDEX) 5 % cream AAA on face BID x 4-6 weeks. Stop if blistered, oozing, or bleeding. Use daily sun protection. 40 g 1    fluorouraciL (EFUDEX) 5 % cream AAA bid x 2-4 weeks 40 g 1    multivitamin (THERAGRAN) per tablet Take 1 tablet by mouth Daily.      nitroGLYCERIN (NITROSTAT) 0.4 MG SL tablet Place 0.4 mg under the tongue.      ondansetron (ZOFRAN-ODT) 4 MG TbDL Take 4 mg by mouth.      pantoprazole (PROTONIX) 40 MG tablet Take 40 mg by mouth once daily.      sodium bicarbonate 650 MG tablet Take 650 mg by mouth 2 (two) times daily.      tacrolimus (PROGRAF) 0.5 MG Cap Take 0.5 mg by mouth every evening.      tacrolimus (PROGRAF) 1 MG Cap TAKE ONE CAPSULE BY MOUTH EVERY 12 HOURS 60 capsule 11    mycophenolate (CELLCEPT) 250 mg Cap Take 1 capsule (250 mg total) by mouth 2 (two) times daily. 60 capsule 11     No current facility-administered medications on file prior to visit.       ALLERGIES:  Iodine and Morphine    EXAM:  Constitutional  General appearance: well-developed, well-nourished, well-kempt older white male    Neurologic/Psychiatric  Alert,  normal orientation to time, place, person  Normal  mood and affect with no evidence of depression, anxiety, agitation  Skin: see photo(s)  Head: background moderate solar damage to exposed areas of skin  There is an approx 1 cm plaque to the inferior edge of the left cutaneous upper lip  There is an approx 2.5 cm pink, scaly plaque to the left preauricular cheek  There is no clinical evidence of residual squamous cell carcinoma in situ to the left ear  he confirmed these sites as the sites of the prior biopsies and site(s) confirmed by reference to the photograph(s) attached below taken at the time of the biopsy/biopsies by the referring physician  Neck: examination reveals chronic solar damage    Photo(s) this visit:               Photo(s) from biopsy visit:               ASSESSMENT: biopsy-proven squamous cell carcinoma of the left upper lip  biopsy-proven basal cell carcinoma of the left cheek  invasive squamous cell carcinoma, left upper lip  basal cell carcinoma, left cheek  squamous cell carcinoma in situ, left ear, clinically clear at present  chronic solar damage to areas as noted above  personal history of non-melanoma skin cancer  Transplanted kidney  Immunosuppressed patient, long term current use of immunosuppressive drug    PLAN:  The diagnoses and management options, and risks and benefits of the alternatives, including observation/non-treatment, radiation treatment, excision with vertical frozen section or paraffin-embedded section margin evaluation, and Mohs' Micrographic Surgery, Fresh Tissue Technique, were discussed at length with the patient and his wife. In particular, the discussion included, but was not limited to, the following:    One alternative at this point would be to defer further treatment and observe the lesions. With small skin cancers of this kind, it is possible that a biopsy can be sufficient to definitively treat a small skin cancer of this kind. Alternatively, some skin cancers are slow growing and do not require immediate  treatment. The potential advantage of this choice would be to avoid the need for possibly unnecessary additional surgery. Among the potential disadvantages of this would be the possibility of enlargement of the lesions, more extensive spread of the lesions or recurrence at a later date, which might necessitate larger and more complex surgeries.    Radiation treatment can be an effective treatment for these types of skin cancer. The usual course of treatment is every weekday for several weeks. Local irritation will result from treatment, although no systemic side effects are expected. The potential advantage of radiation treatment is that it avoids the need for surgery. Among the disadvantages of radiation treatment are the length of treatment, the local inflammatory response, the absence of pathologic confirmation of the removal of the skin cancer, a possible increased risk of additional skin cancer in the treated area in later years, and a somewhat increased risk of recurrence at a later date.     Excisional surgery can be an effective treatment for these types of skin cancer. This would involve excision of the lesions with margin evaluation by submitting the specimens to a pathologist for either immediate marginal assessment via frozen section processing, or delayed marginal assessment by fixed-tissue processing. The potential advantage of this technique is that it offers a way of treating the lesions with some degree of histologic confirmation of tumor removal. Among the disadvantages of this treatment are the possible need for re-excision if marginal involvement is identified, a somewhat greater likelihood of recurrence as compared to Mohs' surgery because of the less comprehensive margin evaluation inherent in the technique, and the general potential risks of surgery, including allergic reactions to the anesthetic and other materials used, infection, injury to nerves in the area with consequent loss of  sensation or muscle function, and scarring or distortion of surrounding structures.    Mohs' surgery is a very effective treatment for these types of skin cancer. The potential advantage of Mohs' surgery is that this technique offers the greatest possible certainty of knowing that the skin cancer has been completely removed, with the removal of the least amount of normal tissue. The potential disadvantages of Mohs' surgery include the duration of the surgery, the possible need for a separate surgery for reconstruction following tumor removal, and scarring as a result. In addition, general potential risks of surgery as noted above also apply to treatment via Mohs' surgery.    In light of the nature of these tumors and the locations on the face in areas of increased risk of recurrence, Mohs' micrographic surgery was thought to be the most appropriate management choice, and these diagnoses are appropriate for treatment by Mohs' micrographic surgery.     We also discussed options for repair of the site to follow tumor removal via Mohs' surgery, including the participation of a reconstructive surgeon to reconstruct the resultant wound. Given the location, the anticipated size and potential complexity of the defect to follow tumor removal via Mohs' surgery, reconstruction will be coordinated with Oscar Evans MD.  I will contact Dr. Evans to arrange for the patient to be seen in consultation, and we will coordinate the surgeries thereafter.    All questions were answered to his satisfaction. He fully understands the aims, risks, alternatives, and possible complications, and has elected to proceed with the surgery, and verbally consented to do so. The procedure will be scheduled in the near future.    Routine pre-op instructions were given to him.    --------------------------------------  Note: Some or all of this note may have been generated using voice recognition software. There may be voice recognition errors  including grammatical and/or spelling errors found in the text. Attempts were made to correct these errors prior to signature.

## 2022-08-23 ENCOUNTER — TELEPHONE (OUTPATIENT)
Dept: OTOLARYNGOLOGY | Facility: CLINIC | Age: 67
End: 2022-08-23
Payer: MEDICARE

## 2022-08-23 NOTE — TELEPHONE ENCOUNTER
----- Message from Hellen Child sent at 8/23/2022  2:12 PM CDT -----  Contact: Pt  Type:  Patient Returning Call    Who Called:  Pt  Who Left Message for Patient:  ?  Does the patient know what this is regarding?:  Yes  Best Call Back Number:  [phone]  Additional Information:  Please call back.  Thanks.

## 2022-09-06 ENCOUNTER — OFFICE VISIT (OUTPATIENT)
Dept: OTOLARYNGOLOGY | Facility: CLINIC | Age: 67
End: 2022-09-06
Payer: MEDICARE

## 2022-09-06 VITALS — BODY MASS INDEX: 28.31 KG/M2 | WEIGHT: 176.13 LBS | HEIGHT: 66 IN

## 2022-09-06 DIAGNOSIS — Z94.0 S/P CADAVER RENAL TRANSPLANT: ICD-10-CM

## 2022-09-06 DIAGNOSIS — Z29.89 NEED FOR PROPHYLACTIC IMMUNOTHERAPY: ICD-10-CM

## 2022-09-06 DIAGNOSIS — C44.02 SQUAMOUS CELL CARCINOMA OF SKIN OF UPPER LIP: Primary | ICD-10-CM

## 2022-09-06 DIAGNOSIS — Z79.02 ANTIPLATELET OR ANTITHROMBOTIC LONG-TERM USE: ICD-10-CM

## 2022-09-06 DIAGNOSIS — C44.319 BASAL CELL CARCINOMA (BCC) OF LEFT PREAURICULAR REGION: ICD-10-CM

## 2022-09-06 LAB
FINAL PATHOLOGIC DIAGNOSIS: NORMAL
GROSS: NORMAL
Lab: NORMAL
MICROSCOPIC EXAM: NORMAL

## 2022-09-06 PROCEDURE — 99215 OFFICE O/P EST HI 40 MIN: CPT | Mod: S$GLB,,, | Performed by: OTOLARYNGOLOGY

## 2022-09-06 PROCEDURE — 1101F PR PT FALLS ASSESS DOC 0-1 FALLS W/OUT INJ PAST YR: ICD-10-PCS | Mod: CPTII,S$GLB,, | Performed by: OTOLARYNGOLOGY

## 2022-09-06 PROCEDURE — 1126F PR PAIN SEVERITY QUANTIFIED, NO PAIN PRESENT: ICD-10-PCS | Mod: CPTII,S$GLB,, | Performed by: OTOLARYNGOLOGY

## 2022-09-06 PROCEDURE — 3288F PR FALLS RISK ASSESSMENT DOCUMENTED: ICD-10-PCS | Mod: CPTII,S$GLB,, | Performed by: OTOLARYNGOLOGY

## 2022-09-06 PROCEDURE — 1159F MED LIST DOCD IN RCRD: CPT | Mod: CPTII,S$GLB,, | Performed by: OTOLARYNGOLOGY

## 2022-09-06 PROCEDURE — 3044F HG A1C LEVEL LT 7.0%: CPT | Mod: CPTII,S$GLB,, | Performed by: OTOLARYNGOLOGY

## 2022-09-06 PROCEDURE — 1160F PR REVIEW ALL MEDS BY PRESCRIBER/CLIN PHARMACIST DOCUMENTED: ICD-10-PCS | Mod: CPTII,S$GLB,, | Performed by: OTOLARYNGOLOGY

## 2022-09-06 PROCEDURE — 4010F ACE/ARB THERAPY RXD/TAKEN: CPT | Mod: CPTII,S$GLB,, | Performed by: OTOLARYNGOLOGY

## 2022-09-06 PROCEDURE — 99999 PR PBB SHADOW E&M-EST. PATIENT-LVL IV: ICD-10-PCS | Mod: PBBFAC,,, | Performed by: OTOLARYNGOLOGY

## 2022-09-06 PROCEDURE — 1159F PR MEDICATION LIST DOCUMENTED IN MEDICAL RECORD: ICD-10-PCS | Mod: CPTII,S$GLB,, | Performed by: OTOLARYNGOLOGY

## 2022-09-06 PROCEDURE — 99999 PR PBB SHADOW E&M-EST. PATIENT-LVL IV: CPT | Mod: PBBFAC,,, | Performed by: OTOLARYNGOLOGY

## 2022-09-06 PROCEDURE — 99215 PR OFFICE/OUTPT VISIT, EST, LEVL V, 40-54 MIN: ICD-10-PCS | Mod: S$GLB,,, | Performed by: OTOLARYNGOLOGY

## 2022-09-06 PROCEDURE — 3008F BODY MASS INDEX DOCD: CPT | Mod: CPTII,S$GLB,, | Performed by: OTOLARYNGOLOGY

## 2022-09-06 PROCEDURE — 1101F PT FALLS ASSESS-DOCD LE1/YR: CPT | Mod: CPTII,S$GLB,, | Performed by: OTOLARYNGOLOGY

## 2022-09-06 PROCEDURE — 3008F PR BODY MASS INDEX (BMI) DOCUMENTED: ICD-10-PCS | Mod: CPTII,S$GLB,, | Performed by: OTOLARYNGOLOGY

## 2022-09-06 PROCEDURE — 3044F PR MOST RECENT HEMOGLOBIN A1C LEVEL <7.0%: ICD-10-PCS | Mod: CPTII,S$GLB,, | Performed by: OTOLARYNGOLOGY

## 2022-09-06 PROCEDURE — 4010F PR ACE/ARB THEARPY RXD/TAKEN: ICD-10-PCS | Mod: CPTII,S$GLB,, | Performed by: OTOLARYNGOLOGY

## 2022-09-06 PROCEDURE — 1160F RVW MEDS BY RX/DR IN RCRD: CPT | Mod: CPTII,S$GLB,, | Performed by: OTOLARYNGOLOGY

## 2022-09-06 PROCEDURE — 1126F AMNT PAIN NOTED NONE PRSNT: CPT | Mod: CPTII,S$GLB,, | Performed by: OTOLARYNGOLOGY

## 2022-09-06 PROCEDURE — 3288F FALL RISK ASSESSMENT DOCD: CPT | Mod: CPTII,S$GLB,, | Performed by: OTOLARYNGOLOGY

## 2022-09-06 RX ORDER — AMLODIPINE BESYLATE 5 MG/1
5 TABLET ORAL DAILY
COMMUNITY
Start: 2022-06-01

## 2022-09-06 NOTE — PROGRESS NOTES
Subjective:       Patient ID: Raymundo Restrepo is a 67 y.o. male.    Chief Complaint: Squamous Cell Carcinoma (On cheek and ear)      Raymundo is here for evaluation of BCC Left pre-auricular area and SCCa of the upper lip.   The lesion has been present for year(s). mild pain upper lip, norapid growth in a short time.   There is positive history of previous skin cancer, numerous cutaneous.  Allergy to Iodine.  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    Social History     Tobacco Use   Smoking Status Never   Smokeless Tobacco Never     Social History     Substance and Sexual Activity   Alcohol Use No          Objective:        Constitutional:   Vital signs are normal. He appears well-developed and well-nourished.     Head:  Normocephalic and atraumatic.       2 x 2 cm superficial plaque of left pre-auricular region     Ears:  Hearing normal to normal and whispered voice; external ear normal without scars, lesions, or masses; ear canal, tympanic membrane, and middle ear normal..     Nose:  Nose normal including turbinates, nasal mucosa, sinuses and nasal septum.     Mouth/Throat  Oropharynx clear and moist without lesions or asymmetry.       1 cm indurated ulcer of cutaneous left upper lip      Neck:  Neck normal without thyromegaly masses, asymmetry, normal tracheal structure, crepitus, and tenderness.       Tests / Results:  none    Assessment:       1. Squamous cell carcinoma of skin of upper lip    2. S/p renal transplant    3. Chronic immunosuppression with Prograf and Cellcept    4. Basal cell carcinoma (BCC) of left preauricular region    5. Antiplatelet or antithrombotic long-term use          Plan:         Raymundo Restrepo is scheduled to undergo Moh's surgery for excision of the lesion. Dr. Guzman will perform the resection, and I will be available for reconstruction of the defect. I discussed my tentative  plan with the patient including local tissue rearrangement for pre-auricular and possible rhomboid vs. Local advancement for the upper lip. I discussed that this may change based on the ultimate defect. I discussed the reconstructive ladder including healing by secondary intention all the way to interpolated flaps, if necessary.     We will plan on reconstruction under General.  Additional perioperative considerations: off plavix 5 days before..  Ok to use Betadine  Does emerge slowly from anesthesia - he wanted us to be aware.

## 2022-09-12 NOTE — PROGRESS NOTES
Dr Guzman,     Would you consider performing Mohs on this lesion at the right preauricular (#1) with this pathology result? Second biopsy, broad shave with same result. Patient has already failed Efudex course, LN2 in past. Multiyear history of lesion. Photo in chart.     Thanks for everything,  Bijal Watters

## 2022-09-15 ENCOUNTER — TELEPHONE (OUTPATIENT)
Dept: INFECTIOUS DISEASES | Facility: CLINIC | Age: 67
End: 2022-09-15
Payer: MEDICARE

## 2022-09-15 NOTE — TELEPHONE ENCOUNTER
"Infectious Disease Follow up:    Called Mr. Restrepo for 8 week follow up post FMT with [id number]. He received the FMT on 7/18/22 for recurrent C.Diff. States that he has had a couple of bad episodes since the FMT, but does overall feel he is doing better. States the while his bowel movements are never "normal" the first episode he had he noted were more "slimy" and had abdominal cramping and low grade fever. States symptoms resolved within few days. Second episode was recently, but was due to Kayexalate he was prescribed for hyperkalemia. While he doesn't feel back to normal symptoms are improved. Discussed that since no return of C.Diff within 8 weeks will consider this a treatment success. If diarrhea returns and he suspects C.Diff to please call clinic for follow up.            "

## 2022-09-16 ENCOUNTER — TELEPHONE (OUTPATIENT)
Dept: DERMATOLOGY | Facility: CLINIC | Age: 67
End: 2022-09-16
Payer: MEDICARE

## 2022-09-23 ENCOUNTER — PATIENT MESSAGE (OUTPATIENT)
Dept: OTOLARYNGOLOGY | Facility: CLINIC | Age: 67
End: 2022-09-23
Payer: MEDICARE

## 2022-09-23 DIAGNOSIS — Z29.89 NEED FOR PROPHYLACTIC IMMUNOTHERAPY: ICD-10-CM

## 2022-09-23 DIAGNOSIS — C44.02 SQUAMOUS CELL CARCINOMA OF SKIN OF UPPER LIP: Primary | ICD-10-CM

## 2022-09-23 DIAGNOSIS — C44.319 BASAL CELL CARCINOMA (BCC) OF LEFT PREAURICULAR REGION: ICD-10-CM

## 2022-09-23 DIAGNOSIS — Z94.0 S/P CADAVER RENAL TRANSPLANT: ICD-10-CM

## 2022-09-23 DIAGNOSIS — L57.0 ACTINIC KERATOSIS: ICD-10-CM

## 2022-09-23 NOTE — TELEPHONE ENCOUNTER
Dr. Watters with Dermatology has asked me to remove a R pre-auricular AK which is not resolving. Added to case request.

## 2022-10-04 NOTE — PROGRESS NOTES
Dr Evans has agreed to excise lesion at R preauricular.   [facility] pool - can I get an update on surgery date for excision of this R preauricular lesion (this lesion was not included in those previously evaluated for Mohs closure per Thomas at the [facility] appt on 9/6; refer to his message on 9/23)? Thank you

## 2022-10-23 ENCOUNTER — PATIENT MESSAGE (OUTPATIENT)
Dept: SURGERY | Facility: HOSPITAL | Age: 67
End: 2022-10-23
Payer: MEDICARE

## 2022-10-31 ENCOUNTER — TELEPHONE (OUTPATIENT)
Dept: DERMATOLOGY | Facility: CLINIC | Age: 67
End: 2022-10-31
Payer: MEDICARE

## 2022-10-31 ENCOUNTER — PATIENT MESSAGE (OUTPATIENT)
Dept: SURGERY | Facility: HOSPITAL | Age: 67
End: 2022-10-31
Payer: MEDICARE

## 2022-10-31 NOTE — TELEPHONE ENCOUNTER
Patient called to reschedule his surgery on 11-3-22. Because he can not do a Friday. We rescheduled him to 12-20-22 for Dr. Guzman and 12-21-22 for Dr. Evans.

## 2022-11-07 ENCOUNTER — OFFICE VISIT (OUTPATIENT)
Dept: DERMATOLOGY | Facility: CLINIC | Age: 67
End: 2022-11-07
Payer: MEDICARE

## 2022-11-07 VITALS — BODY MASS INDEX: 28.31 KG/M2 | RESPIRATION RATE: 18 BRPM | WEIGHT: 176.13 LBS | HEIGHT: 66 IN

## 2022-11-07 DIAGNOSIS — Z85.828 HISTORY OF NONMELANOMA SKIN CANCER: ICD-10-CM

## 2022-11-07 DIAGNOSIS — D49.2 ATYPICAL SQUAMOPROLIFERATIVE SKIN LESION: ICD-10-CM

## 2022-11-07 DIAGNOSIS — C44.02 SQUAMOUS CELL CARCINOMA, LIP: ICD-10-CM

## 2022-11-07 DIAGNOSIS — D04.22 SQUAMOUS CELL CARCINOMA IN SITU OF SKIN OF EAR, LEFT: ICD-10-CM

## 2022-11-07 DIAGNOSIS — C44.319 BASAL CELL CARCINOMA OF LEFT CHEEK: Primary | ICD-10-CM

## 2022-11-07 DIAGNOSIS — D84.9 IMMUNOCOMPROMISED STATE: ICD-10-CM

## 2022-11-07 DIAGNOSIS — Z12.83 SCREENING FOR SKIN CANCER: ICD-10-CM

## 2022-11-07 DIAGNOSIS — L57.0 AK (ACTINIC KERATOSIS): ICD-10-CM

## 2022-11-07 PROCEDURE — 3044F HG A1C LEVEL LT 7.0%: CPT | Mod: CPTII,S$GLB,, | Performed by: DERMATOLOGY

## 2022-11-07 PROCEDURE — 3008F PR BODY MASS INDEX (BMI) DOCUMENTED: ICD-10-PCS | Mod: CPTII,S$GLB,, | Performed by: DERMATOLOGY

## 2022-11-07 PROCEDURE — 17003 DESTRUCTION, PREMALIGNANT LESIONS; SECOND THROUGH 14 LESIONS: ICD-10-PCS | Mod: S$GLB,,, | Performed by: DERMATOLOGY

## 2022-11-07 PROCEDURE — 1159F PR MEDICATION LIST DOCUMENTED IN MEDICAL RECORD: ICD-10-PCS | Mod: CPTII,S$GLB,, | Performed by: DERMATOLOGY

## 2022-11-07 PROCEDURE — 4010F ACE/ARB THERAPY RXD/TAKEN: CPT | Mod: CPTII,S$GLB,, | Performed by: DERMATOLOGY

## 2022-11-07 PROCEDURE — 99999 PR PBB SHADOW E&M-EST. PATIENT-LVL IV: ICD-10-PCS | Mod: PBBFAC,,, | Performed by: DERMATOLOGY

## 2022-11-07 PROCEDURE — 17000 DESTRUCT PREMALG LESION: CPT | Mod: S$GLB,,, | Performed by: DERMATOLOGY

## 2022-11-07 PROCEDURE — 99212 PR OFFICE/OUTPT VISIT, EST, LEVL II, 10-19 MIN: ICD-10-PCS | Mod: 25,S$GLB,, | Performed by: DERMATOLOGY

## 2022-11-07 PROCEDURE — 4010F PR ACE/ARB THEARPY RXD/TAKEN: ICD-10-PCS | Mod: CPTII,S$GLB,, | Performed by: DERMATOLOGY

## 2022-11-07 PROCEDURE — 3008F BODY MASS INDEX DOCD: CPT | Mod: CPTII,S$GLB,, | Performed by: DERMATOLOGY

## 2022-11-07 PROCEDURE — 99999 PR PBB SHADOW E&M-EST. PATIENT-LVL IV: CPT | Mod: PBBFAC,,, | Performed by: DERMATOLOGY

## 2022-11-07 PROCEDURE — 1159F MED LIST DOCD IN RCRD: CPT | Mod: CPTII,S$GLB,, | Performed by: DERMATOLOGY

## 2022-11-07 PROCEDURE — 3044F PR MOST RECENT HEMOGLOBIN A1C LEVEL <7.0%: ICD-10-PCS | Mod: CPTII,S$GLB,, | Performed by: DERMATOLOGY

## 2022-11-07 PROCEDURE — 99212 OFFICE O/P EST SF 10 MIN: CPT | Mod: 25,S$GLB,, | Performed by: DERMATOLOGY

## 2022-11-07 PROCEDURE — 17000 PR DESTRUCTION(LASER SURGERY,CRYOSURGERY,CHEMOSURGERY),PREMALIGNANT LESIONS,FIRST LESION: ICD-10-PCS | Mod: S$GLB,,, | Performed by: DERMATOLOGY

## 2022-11-07 PROCEDURE — 1101F PT FALLS ASSESS-DOCD LE1/YR: CPT | Mod: CPTII,S$GLB,, | Performed by: DERMATOLOGY

## 2022-11-07 PROCEDURE — 1101F PR PT FALLS ASSESS DOC 0-1 FALLS W/OUT INJ PAST YR: ICD-10-PCS | Mod: CPTII,S$GLB,, | Performed by: DERMATOLOGY

## 2022-11-07 PROCEDURE — 3288F FALL RISK ASSESSMENT DOCD: CPT | Mod: CPTII,S$GLB,, | Performed by: DERMATOLOGY

## 2022-11-07 PROCEDURE — 3288F PR FALLS RISK ASSESSMENT DOCUMENTED: ICD-10-PCS | Mod: CPTII,S$GLB,, | Performed by: DERMATOLOGY

## 2022-11-07 PROCEDURE — 1126F PR PAIN SEVERITY QUANTIFIED, NO PAIN PRESENT: ICD-10-PCS | Mod: CPTII,S$GLB,, | Performed by: DERMATOLOGY

## 2022-11-07 PROCEDURE — 1126F AMNT PAIN NOTED NONE PRSNT: CPT | Mod: CPTII,S$GLB,, | Performed by: DERMATOLOGY

## 2022-11-07 PROCEDURE — 17003 DESTRUCT PREMALG LES 2-14: CPT | Mod: S$GLB,,, | Performed by: DERMATOLOGY

## 2022-11-07 NOTE — Clinical Note
Dr Guzman,   This patient is pending Mohs for 3 sites - L preauricular BCC, L upper lip SCC mod diff, and L ear SCC is. I saw him in f/u today. His L ear SCC is does not look like it has significant clinical residual, discussed alternative of treating with Efudex course and closely monitoring. Told patient I would discuss with you first. Photos in chart. Thank you!

## 2022-11-07 NOTE — PROGRESS NOTES
"  Subjective:       Patient ID:  Raymundo Restrepo is a 67 y.o. male who presents for   Chief Complaint   Patient presents with    Skin Check     HPI  Established patient.  Here today for upper body skin exam.    Immunosuppressed: hx kidney transplant 2010, maintained on Prograf and Cellcept.  Hx of NMSC as below; currently pending Mohs with PSW for mod diff SCC at L upper lip, BCC at L preauricular, and SCC is at L ear. Also awaiting excision of persistent crateriform squamoproliferative lesion at R preauricular per ENT. Reports lesions at L upper lip and L preauricular seem to be enlarging; lesion at L ear seems resolved.   C/o some new rough scaly areas at R neck, brows, forehead.   No further complaints today.     Derm Hx:    SCC, moderately differentiated, at L upper lip pending Mohs (scheduled 12/20/23)  BCC at L preauricular pending Mohs (scheduled 12/20/23)  SCC is at L ear pending Mohs (scheduled 12/20/23)  Persistent "crateriform squamoproliferative lesion" at R preauricular [superior] pending excision with ENT (scheduled 12/21/23); prior LN2, Efudex failure  SCC is (focal) arising within HAK s/p EDC and subsequent Efudex course 8/2022  SCC is at L dorsal hand s/p EDC 8/2023  SCC is at R preauricular cheek (inferior) s/p Mohs   SCC is at L neck  SCC is at L posterior neck  SCC is at L dorsal wrist s/p EDC    Review of Systems   Skin:  Positive for activity-related sunscreen use. Negative for daily sunscreen use and wears hat.   Hematologic/Lymphatic: Bruises/bleeds easily.      Objective:    Physical Exam   Constitutional: He appears well-developed and well-nourished.   Neurological: He is alert and oriented to person, place, and time.   Psychiatric: He has a normal mood and affect.   Skin:   Areas Examined (abnormalities noted in diagram):   Scalp / Hair Palpated and Inspected  Head / Face Inspection Performed  RUE Inspected  LUE Inspection Performed                 Diagram Legend     Erythematous scaling " macule/papule c/w actinic keratosis       Vascular papule c/w angioma      Pigmented verrucoid papule/plaque c/w seborrheic keratosis      Yellow umbilicated papule c/w sebaceous hyperplasia      Irregularly shaped tan macule c/w lentigo     1-2 mm smooth white papules consistent with Milia      Movable subcutaneous cyst with punctum c/w epidermal inclusion cyst      Subcutaneous movable cyst c/w pilar cyst      Firm pink to brown papule c/w dermatofibroma      Pedunculated fleshy papule(s) c/w skin tag(s)      Evenly pigmented macule c/w junctional nevus     Mildly variegated pigmented, slightly irregular-bordered macule c/w mildly atypical nevus      Flesh colored to evenly pigmented papule c/w intradermal nevus       Pink pearly papule/plaque c/w basal cell carcinoma      Erythematous hyperkeratotic cursted plaque c/w SCC      Surgical scar with no sign of skin cancer recurrence      Open and closed comedones      Inflammatory papules and pustules      Verrucoid papule consistent consistent with wart     Erythematous eczematous patches and plaques     Dystrophic onycholytic nail with subungual debris c/w onychomycosis     Umbilicated papule    Erythematous-base heme-crusted tan verrucoid plaque consistent with inflamed seborrheic keratosis     Erythematous Silvery Scaling Plaque c/w Psoriasis     See annotation        Assessment / Plan:          Basal cell carcinoma of left cheek  Pending Mohs, scheduled 12/20/22    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     Squamous cell carcinoma, lip  Pending Mohs, scheduled 12/20/22    Atypical squamoproliferative skin lesion  Pending excision with ENT, scheduled 12/21/22    AK (actinic keratosis)  - Discussed diagnosis, etiology, and precancerous nature of condition.   - Cryosurgery Procedure Note: Discussed procedure with  patient/patient's guardian including risks and benefits as well as treatment alternatives. Risks of procedure include pain, itching, swelling, redness, blistering, crusting, wound formation, post-inflammatory pigmentary alteration, scar, recurrence. Verbal consent obtained. LN2 cryosurgery performed to 9 lesion(s). Patient tolerated procedure well. After-visit wound care instructions reviewed and provided in writing.      Hx of nonmelanoma skin cancer  Immunocompromised state  Screening exam for skin cancer  - Head, neck, distal BUE screening performed today.  - Findings listed above.   - Discussed increased risk of skin cancer with immunosuppression.   - Recommended routine self examination of skin.    - Recommended daily sun protection, including the use of OTC broad-spectrum sunscreen (SPF 30 or greater) and sun-protective clothing.             Follow up in about 4 months (around 3/7/2023) for skin check.    ADDENDUM: Discussed previously diagnosed SCC is at ear with Dr Guzman, both agree Efudex BID x 4 weeks reasonable alternative to surgery especially given no significant clinical residual after above exam. Relayed plan to patient, to start treating with Efudex cream as instructed.

## 2022-11-08 NOTE — PATIENT INSTRUCTIONS

## 2022-12-20 ENCOUNTER — TELEPHONE (OUTPATIENT)
Dept: DERMATOLOGY | Facility: CLINIC | Age: 67
End: 2022-12-20

## 2022-12-20 ENCOUNTER — ANESTHESIA EVENT (OUTPATIENT)
Dept: SURGERY | Facility: HOSPITAL | Age: 67
End: 2022-12-20
Payer: MEDICARE

## 2022-12-20 NOTE — TELEPHONE ENCOUNTER
Called patient to let him know that we had to cancel his surgery because Dr. Guzman is out with an intestinal visus. I told him that I would get his surgery rescheduled as soon as possible.

## 2022-12-20 NOTE — TELEPHONE ENCOUNTER
Called patient and I rescheduled him for 12-21-22 and Dr. Evans also. I told him he needs to be NPO for tomorrow all day.

## 2022-12-21 ENCOUNTER — PROCEDURE VISIT (OUTPATIENT)
Dept: DERMATOLOGY | Facility: CLINIC | Age: 67
End: 2022-12-21
Payer: MEDICARE

## 2022-12-21 ENCOUNTER — ANESTHESIA (OUTPATIENT)
Dept: SURGERY | Facility: HOSPITAL | Age: 67
End: 2022-12-21
Payer: MEDICARE

## 2022-12-21 ENCOUNTER — HOSPITAL ENCOUNTER (OUTPATIENT)
Facility: HOSPITAL | Age: 67
Discharge: HOME | End: 2022-12-21
Attending: OTOLARYNGOLOGY | Admitting: OTOLARYNGOLOGY
Payer: MEDICARE

## 2022-12-21 VITALS
HEIGHT: 66 IN | HEART RATE: 66 BPM | BODY MASS INDEX: 28.28 KG/M2 | WEIGHT: 176 LBS | DIASTOLIC BLOOD PRESSURE: 69 MMHG | SYSTOLIC BLOOD PRESSURE: 163 MMHG

## 2022-12-21 DIAGNOSIS — C44.02 SQUAMOUS CELL CARCINOMA OF SKIN OF UPPER LIP: Primary | ICD-10-CM

## 2022-12-21 DIAGNOSIS — C44.02 SQUAMOUS CELL CARCINOMA, LIP: ICD-10-CM

## 2022-12-21 DIAGNOSIS — C44.319 BASAL CELL CARCINOMA OF LEFT CHEEK: Primary | ICD-10-CM

## 2022-12-21 PROCEDURE — 12053 INTMD RPR FACE/MM 5.1-7.5 CM: CPT | Mod: 59,,, | Performed by: OTOLARYNGOLOGY

## 2022-12-21 PROCEDURE — 36000707: Mod: PO | Performed by: OTOLARYNGOLOGY

## 2022-12-21 PROCEDURE — D9220A PRA ANESTHESIA: ICD-10-PCS | Mod: CRNA,,, | Performed by: NURSE ANESTHETIST, CERTIFIED REGISTERED

## 2022-12-21 PROCEDURE — 13152 PR RECMPL WND LID,NOS,EAR 2.6-7.5 CM: ICD-10-PCS | Mod: 59,,, | Performed by: OTOLARYNGOLOGY

## 2022-12-21 PROCEDURE — 37000008 HC ANESTHESIA 1ST 15 MINUTES: Mod: PO | Performed by: OTOLARYNGOLOGY

## 2022-12-21 PROCEDURE — 63600175 PHARM REV CODE 636 W HCPCS: Mod: PO | Performed by: ANESTHESIOLOGY

## 2022-12-21 PROCEDURE — 40530 PARTIAL REMOVAL OF LIP: CPT | Mod: ,,, | Performed by: OTOLARYNGOLOGY

## 2022-12-21 PROCEDURE — 11443 EXC FACE-MM B9+MARG 2.1-3 CM: CPT | Mod: 51,,, | Performed by: OTOLARYNGOLOGY

## 2022-12-21 PROCEDURE — 88305 TISSUE EXAM BY PATHOLOGIST: CPT | Performed by: PATHOLOGY

## 2022-12-21 PROCEDURE — 88305 TISSUE EXAM BY PATHOLOGIST: ICD-10-PCS | Mod: 26,,, | Performed by: PATHOLOGY

## 2022-12-21 PROCEDURE — 12053 PR LAYR CLOS WND FACE,FACIAL 5.1-7.5 CM: ICD-10-PCS | Mod: 59,,, | Performed by: OTOLARYNGOLOGY

## 2022-12-21 PROCEDURE — D9220A PRA ANESTHESIA: ICD-10-PCS | Mod: ANES,,, | Performed by: ANESTHESIOLOGY

## 2022-12-21 PROCEDURE — 17311: ICD-10-PCS | Mod: S$GLB,,, | Performed by: DERMATOLOGY

## 2022-12-21 PROCEDURE — 99499 UNLISTED E&M SERVICE: CPT | Mod: S$GLB,,, | Performed by: DERMATOLOGY

## 2022-12-21 PROCEDURE — 63600175 PHARM REV CODE 636 W HCPCS: Mod: PO | Performed by: NURSE ANESTHETIST, CERTIFIED REGISTERED

## 2022-12-21 PROCEDURE — 25000003 PHARM REV CODE 250: Mod: PO | Performed by: OTOLARYNGOLOGY

## 2022-12-21 PROCEDURE — 99499 NO LOS: ICD-10-PCS | Mod: S$GLB,,, | Performed by: DERMATOLOGY

## 2022-12-21 PROCEDURE — 40530 PR PARTIAL REMOVAL OF LIP,>1/4: ICD-10-PCS | Mod: ,,, | Performed by: OTOLARYNGOLOGY

## 2022-12-21 PROCEDURE — D9220A PRA ANESTHESIA: Mod: ANES,,, | Performed by: ANESTHESIOLOGY

## 2022-12-21 PROCEDURE — 71000033 HC RECOVERY, INTIAL HOUR: Mod: PO | Performed by: OTOLARYNGOLOGY

## 2022-12-21 PROCEDURE — D9220A PRA ANESTHESIA: Mod: CRNA,,, | Performed by: NURSE ANESTHETIST, CERTIFIED REGISTERED

## 2022-12-21 PROCEDURE — 17312 MOHS ADDL STAGE: CPT | Mod: S$GLB,,, | Performed by: DERMATOLOGY

## 2022-12-21 PROCEDURE — 17311 MOHS 1 STAGE H/N/HF/G: CPT | Mod: S$GLB,,, | Performed by: DERMATOLOGY

## 2022-12-21 PROCEDURE — 88305 TISSUE EXAM BY PATHOLOGIST: CPT | Mod: 26,,, | Performed by: PATHOLOGY

## 2022-12-21 PROCEDURE — 37000009 HC ANESTHESIA EA ADD 15 MINS: Mod: PO | Performed by: OTOLARYNGOLOGY

## 2022-12-21 PROCEDURE — 25000003 PHARM REV CODE 250: Mod: PO | Performed by: NURSE ANESTHETIST, CERTIFIED REGISTERED

## 2022-12-21 PROCEDURE — 17312: ICD-10-PCS | Mod: S$GLB,,, | Performed by: DERMATOLOGY

## 2022-12-21 PROCEDURE — 71000015 HC POSTOP RECOV 1ST HR: Mod: PO | Performed by: OTOLARYNGOLOGY

## 2022-12-21 PROCEDURE — 36000706: Mod: PO | Performed by: OTOLARYNGOLOGY

## 2022-12-21 PROCEDURE — 13152 CMPLX RPR E/N/E/L 2.6-7.5 CM: CPT | Mod: 59,,, | Performed by: OTOLARYNGOLOGY

## 2022-12-21 PROCEDURE — 11443 PR EXC SKIN BENIG 2.1-3 CM FACE,FACIAL: ICD-10-PCS | Mod: 51,,, | Performed by: OTOLARYNGOLOGY

## 2022-12-21 RX ORDER — DEXAMETHASONE SODIUM PHOSPHATE 4 MG/ML
INJECTION, SOLUTION INTRA-ARTICULAR; INTRALESIONAL; INTRAMUSCULAR; INTRAVENOUS; SOFT TISSUE
Status: DISCONTINUED | OUTPATIENT
Start: 2022-12-21 | End: 2022-12-21

## 2022-12-21 RX ORDER — LIDOCAINE HYDROCHLORIDE 10 MG/ML
1 INJECTION, SOLUTION EPIDURAL; INFILTRATION; INTRACAUDAL; PERINEURAL ONCE
Status: ACTIVE | OUTPATIENT
Start: 2022-12-21

## 2022-12-21 RX ORDER — BUPIVACAINE HYDROCHLORIDE 2.5 MG/ML
INJECTION, SOLUTION EPIDURAL; INFILTRATION; INTRACAUDAL
Status: DISCONTINUED | OUTPATIENT
Start: 2022-12-21 | End: 2022-12-21 | Stop reason: HOSPADM

## 2022-12-21 RX ORDER — FENTANYL CITRATE 50 UG/ML
25 INJECTION, SOLUTION INTRAMUSCULAR; INTRAVENOUS EVERY 5 MIN PRN
Status: ACTIVE | OUTPATIENT
Start: 2022-12-21

## 2022-12-21 RX ORDER — ONDANSETRON 4 MG/1
4 TABLET, ORALLY DISINTEGRATING ORAL EVERY 6 HOURS PRN
Qty: 10 TABLET | Refills: 0 | Status: SHIPPED | OUTPATIENT
Start: 2022-12-21

## 2022-12-21 RX ORDER — SODIUM CHLORIDE, SODIUM LACTATE, POTASSIUM CHLORIDE, CALCIUM CHLORIDE 600; 310; 30; 20 MG/100ML; MG/100ML; MG/100ML; MG/100ML
125 INJECTION, SOLUTION INTRAVENOUS CONTINUOUS
Status: DISPENSED | OUTPATIENT
Start: 2022-12-21

## 2022-12-21 RX ORDER — ONDANSETRON 2 MG/ML
4 INJECTION INTRAMUSCULAR; INTRAVENOUS ONCE AS NEEDED
Status: COMPLETED | OUTPATIENT
Start: 2022-12-21 | End: 2022-12-21

## 2022-12-21 RX ORDER — LIDOCAINE HCL/PF 100 MG/5ML
SYRINGE (ML) INTRAVENOUS
Status: DISCONTINUED | OUTPATIENT
Start: 2022-12-21 | End: 2022-12-21

## 2022-12-21 RX ORDER — EPHEDRINE SULFATE 50 MG/ML
INJECTION, SOLUTION INTRAVENOUS
Status: DISCONTINUED | OUTPATIENT
Start: 2022-12-21 | End: 2022-12-21

## 2022-12-21 RX ORDER — HYDROCODONE BITARTRATE AND ACETAMINOPHEN 5; 325 MG/1; MG/1
1 TABLET ORAL EVERY 4 HOURS PRN
Qty: 15 TABLET | Refills: 0 | Status: SHIPPED | OUTPATIENT
Start: 2022-12-21 | End: 2023-12-18 | Stop reason: ALTCHOICE

## 2022-12-21 RX ORDER — ROCURONIUM BROMIDE 10 MG/ML
INJECTION, SOLUTION INTRAVENOUS
Status: DISCONTINUED | OUTPATIENT
Start: 2022-12-21 | End: 2022-12-21

## 2022-12-21 RX ORDER — SODIUM CHLORIDE, SODIUM LACTATE, POTASSIUM CHLORIDE, CALCIUM CHLORIDE 600; 310; 30; 20 MG/100ML; MG/100ML; MG/100ML; MG/100ML
INJECTION, SOLUTION INTRAVENOUS CONTINUOUS
Status: DISPENSED | OUTPATIENT
Start: 2022-12-21

## 2022-12-21 RX ORDER — ONDANSETRON 2 MG/ML
INJECTION INTRAMUSCULAR; INTRAVENOUS
Status: DISCONTINUED | OUTPATIENT
Start: 2022-12-21 | End: 2022-12-21

## 2022-12-21 RX ORDER — ACETAMINOPHEN 10 MG/ML
INJECTION, SOLUTION INTRAVENOUS
Status: DISCONTINUED | OUTPATIENT
Start: 2022-12-21 | End: 2022-12-21

## 2022-12-21 RX ORDER — LIDOCAINE HYDROCHLORIDE AND EPINEPHRINE 10; 10 MG/ML; UG/ML
INJECTION, SOLUTION INFILTRATION; PERINEURAL
Status: DISCONTINUED | OUTPATIENT
Start: 2022-12-21 | End: 2022-12-21 | Stop reason: HOSPADM

## 2022-12-21 RX ORDER — MUPIROCIN 20 MG/G
OINTMENT TOPICAL
Status: DISCONTINUED | OUTPATIENT
Start: 2022-12-21 | End: 2022-12-21 | Stop reason: HOSPADM

## 2022-12-21 RX ORDER — OXYCODONE HYDROCHLORIDE 5 MG/1
5 TABLET ORAL ONCE AS NEEDED
Status: ACTIVE | OUTPATIENT
Start: 2022-12-21 | End: 2034-05-19

## 2022-12-21 RX ORDER — PROPOFOL 10 MG/ML
VIAL (ML) INTRAVENOUS
Status: DISCONTINUED | OUTPATIENT
Start: 2022-12-21 | End: 2022-12-21

## 2022-12-21 RX ORDER — FENTANYL CITRATE 50 UG/ML
INJECTION, SOLUTION INTRAMUSCULAR; INTRAVENOUS
Status: DISCONTINUED | OUTPATIENT
Start: 2022-12-21 | End: 2022-12-21

## 2022-12-21 RX ORDER — MIDAZOLAM HYDROCHLORIDE 1 MG/ML
INJECTION INTRAMUSCULAR; INTRAVENOUS
Status: DISCONTINUED | OUTPATIENT
Start: 2022-12-21 | End: 2022-12-21

## 2022-12-21 RX ADMIN — LIDOCAINE HYDROCHLORIDE 100 MG: 20 INJECTION, SOLUTION INTRAVENOUS at 02:12

## 2022-12-21 RX ADMIN — DEXAMETHASONE SODIUM PHOSPHATE 8 MG: 4 INJECTION, SOLUTION INTRAMUSCULAR; INTRAVENOUS at 02:12

## 2022-12-21 RX ADMIN — PROPOFOL 200 MG: 10 INJECTION, EMULSION INTRAVENOUS at 02:12

## 2022-12-21 RX ADMIN — SODIUM CHLORIDE, SODIUM LACTATE, POTASSIUM CHLORIDE, AND CALCIUM CHLORIDE: .6; .31; .03; .02 INJECTION, SOLUTION INTRAVENOUS at 02:12

## 2022-12-21 RX ADMIN — ACETAMINOPHEN 1000 MG: 10 INJECTION, SOLUTION INTRAVENOUS at 02:12

## 2022-12-21 RX ADMIN — ONDANSETRON 4 MG: 2 INJECTION, SOLUTION INTRAMUSCULAR; INTRAVENOUS at 03:12

## 2022-12-21 RX ADMIN — ONDANSETRON 4 MG: 2 INJECTION INTRAMUSCULAR; INTRAVENOUS at 04:12

## 2022-12-21 RX ADMIN — MIDAZOLAM HYDROCHLORIDE 2 MG: 1 INJECTION, SOLUTION INTRAMUSCULAR; INTRAVENOUS at 02:12

## 2022-12-21 RX ADMIN — ROCURONIUM BROMIDE 10 MG: 10 INJECTION, SOLUTION INTRAVENOUS at 02:12

## 2022-12-21 RX ADMIN — ROCURONIUM BROMIDE 40 MG: 10 INJECTION, SOLUTION INTRAVENOUS at 02:12

## 2022-12-21 RX ADMIN — SUGAMMADEX 200 MG: 100 INJECTION, SOLUTION INTRAVENOUS at 03:12

## 2022-12-21 RX ADMIN — FENTANYL CITRATE 100 MCG: 50 INJECTION, SOLUTION INTRAMUSCULAR; INTRAVENOUS at 02:12

## 2022-12-21 RX ADMIN — EPHEDRINE SULFATE 10 MG: 50 INJECTION INTRAVENOUS at 02:12

## 2022-12-21 NOTE — H&P (VIEW-ONLY)
Prior photo(s) of site(s) to confirm location(s):           Defibrillator: No  Pacemaker: No  Artificial heart valves: No  Artificial joints: No    ALLERGIES:   Iodine and Morphine      Current Outpatient Medications:     acetaminophen (TYLENOL) 500 MG tablet, Take 1 tablet (500 mg total) by mouth every 6 (six) hours as needed for Pain., Disp: , Rfl:     allopurinoL (ZYLOPRIM) 300 MG tablet, Take 300 mg by mouth once daily., Disp: , Rfl:     amLODIPine (NORVASC) 5 MG tablet, Take 5 mg by mouth once daily., Disp: , Rfl:     carvedilol (COREG) 12.5 MG tablet, Take 12.5 mg by mouth 2 (two) times daily. , Disp: , Rfl:     clopidogreL (PLAVIX) 75 mg tablet, Take 1 tablet (75 mg total) by mouth once daily., Disp: , Rfl:     fluorouraciL (EFUDEX) 5 % cream, AAA on face BID x 4-6 weeks. Stop if blistered, oozing, or bleeding. Use daily sun protection., Disp: 40 g, Rfl: 1    fluorouraciL (EFUDEX) 5 % cream, AAA bid x 2-4 weeks, Disp: 40 g, Rfl: 1    multivitamin (THERAGRAN) per tablet, Take 1 tablet by mouth Daily., Disp: , Rfl:     mycophenolate (CELLCEPT) 250 mg Cap, Take 1 capsule (250 mg total) by mouth 2 (two) times daily., Disp: 60 capsule, Rfl: 11    nitroGLYCERIN (NITROSTAT) 0.4 MG SL tablet, Place 0.4 mg under the tongue., Disp: , Rfl:     ondansetron (ZOFRAN-ODT) 4 MG TbDL, Take 4 mg by mouth., Disp: , Rfl:     pantoprazole (PROTONIX) 40 MG tablet, Take 40 mg by mouth once daily., Disp: , Rfl:     sodium bicarbonate 650 MG tablet, Take 650 mg by mouth 2 (two) times daily., Disp: , Rfl:     tacrolimus (PROGRAF) 0.5 MG Cap, Take 0.5 mg by mouth every evening., Disp: , Rfl:     tacrolimus (PROGRAF) 1 MG Cap, TAKE ONE CAPSULE BY MOUTH EVERY 12 HOURS, Disp: 60 capsule, Rfl: 11  -------------------------------------------------------------  PROCEDURE: Mohs' Micrographic Surgery to two sites    FIRST SITE: left upper lip    INDICATION: squamous cell carcinoma in an area at increased risk of recurrence    CASE NUMBER:  [id number]      ANESTHETIC: 18 mL 2% Lidocaine with Epinephrine 1:200K    SURGICAL PREP: Ethanol and Hibiclens    SURGEON: Olvin Guzman MD    ASSISTANTS: Diann Broderick CST     PREOPERATIVE DIAGNOSIS: squamous cell carcinoma     POSTOPERATIVE DIAGNOSIS: squamous cell carcinoma     PATHOLOGIC DIAGNOSIS: moderately-differentiated squamous cell carcinoma     STAGES OF MOHS' SURGERY PERFORMED: two    TUMOR-FREE PLANE ACHIEVED: yes    HEMOSTASIS: Hyfrecation     SPECIMENS: three (two in stage A, one in stage B)    INITIAL LESION SIZE: 2.1 x 2.1 cm    FINAL DEFECT SIZE: 2.1 x 3.1 cm    WOUND REPAIR/DISPOSITION: see below    ---------------------------------------    SECOND SITE: left preauricular cheek    INDICATION: basal cell carcinoma in an area at increased risk of recurrence    CASE NUMBER: [id number]      ANESTHETIC: as above    SURGICAL PREP: as above    SURGEON: Olvin Guzman MD    ASSISTANTS: as above    PREOPERATIVE DIAGNOSIS: basal cell carcinoma     POSTOPERATIVE DIAGNOSIS: basal cell carcinoma .    PATHOLOGIC DIAGNOSIS: basal cell carcinoma     STAGES OF MOHS' SURGERY PERFORMED: one    TUMOR-FREE PLANE ACHIEVED: yes    HEMOSTASIS: Hyfrecation     SPECIMENS: two (two in stage A)    INITIAL LESION SIZE: 2.2 x 2.9 cm    FINAL DEFECT SIZE: 2.0 x 3.2 cm    WOUND REPAIR/DISPOSITION: see below    NARRATIVE:    The patient is a 67 y.o.male referred by Caroline Watters M.D. with a history of cancers on the left upper lip, left preauricular cheek, and left ear which were biopsied - pathology accession #[id number],  [facility]. Findings revealed invasive squamous cell carcinoma at the left upper lip site and basal cell carcinoma at the left cheek preauricular cheek site. Examination revealed a verrucoid tumor on the left upper lip and a pink, scaly plaque on the left preauricular cheek at the sites of prior biopsies, which were confirmed by reference to the photograph(s) taken at the previous  patient visit, as attached above. In light of the nature of these tumors and the locations, Mohs' micrographic surgery was thought to be the most appropriate management choice, and these diagnoses are appropriate for treatment by Mohs' micrographic surgery.  I discussed it with the patient and he fully understands the aims, risks, alternatives, and possible complications, and elects to proceed.  There are no medical or surgical contraindications to the procedure.     Note that Mr. 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 I had originally anticipated treating this lesion via Mohs' surgery as well. However, it has clinically resolved and after Dr. Watters and I spoke about it, she recommended that we defer surgery and have him use topical 5-fluorouracil as a less invasive alternative.    Note also that he has a lesion on the right cheek which Dr. Evans is to excise today.    A signed informed consent was obtained.    PROCEDURE:  The site on the left upper lip was addressed first.   The patient was placed in the semi-recumbent position on the operating table in the Mohs' Surgery Suite.The area in question was thoroughly prepped with ethanol and Hibiclens, with particular care to avoid application to the immediate periocular skin. A sterile surgical marker was used to outline the clinically apparent margins of the involved area, and a narrow margin of normal-appearing skin. Reference marks were made at the periphery of the outlined area with the surgical marker. The proposed area of excision was measured and photographed. Local anesthesia as noted above was administered.  The total volume of anesthetic used throughout this portion of the procedure was as documented above. The area was prepared and draped in the standard manner. All of the grossly identifiable area of clinically abnormal tissue and an underlying/peripheral layer was taken and processed by the Mohs' technique.   "Hemostasis was obtained with the hyfrecator. Tissue was taken from any areas of residual marginal involvement (if present) and processed by the Mohs' technique in as many stages as needed until a tumor-free plane was achieved.    Next, the site on the left preauricular cheek was addressed.   The patient was placed in the semi-recumbent position on the operating table in the Mohs' Surgery Suite.The area in question was thoroughly prepped with ethanol and Hibiclens, with particular care to avoid application to the immediate periocular skin. A sterile surgical marker was used to outline the clinically apparent margins of the involved area, and a narrow margin of normal-appearing skin. Reference marks were made at the periphery of the outlined area with the surgical marker. The proposed area of excision was measured and photographed. Local anesthesia as noted above was administered.  The total volume of anesthetic used throughout this portion of the procedure was as documented above. The area was prepared and draped in the standard manner. All of the grossly identifiable area of clinically abnormal tissue and an underlying/peripheral layer was taken and processed by the Mohs' technique.  Hemostasis was obtained with the hyfrecator. Tissue was taken from any areas of residual marginal involvement (if present) and processed by the Mohs' technique in as many stages as needed until a tumor-free plane was achieved.    Colors of inks used in the reference nicks at epidermal margins (if present) and/or inking of non-epithelial edges, if applicable, is represented on the Mohs map as follows: solid lines represent red ink, dots represent blue ink, jagged lines represent black ink, curlicues represent green ink, "xxx" represents yellow ink.    FIRST SITE: left upper lip  The first Mohs' layer consisted of two section(s) with 10 slide(s) evaluated. Some residual tumor was noted at the margins of the first Mohs' layer. Histology of " the specimen(s) showed invasive squamous cell carcinoma, well-differentiated, manifested as nests of atypical epithelioid cells with nuclear pleomorphism of the keratinocytes and a high degree of differentiation at the deep margin on section two along the cut edge, as noted on the Mohs map; in addition, a focus of infiltrative squamous cell carcinoma was noted in the superficial dermis near the red edge, as noted on the Mohs map; in addition, on later cuts, squamous cell carcinoma was noted at the deep margin on section one, and foci of infiltrative squamous cell carcinoma were noted in the superficial dermis near the blue-inked edge, as noted on the Mohs' map; multiple earlier cuts were clear in the latter areas and actual surgical margins were assessed as clear in these areas.      The second Mohs' layer consisted of one section(s) with 6 slide(s) evaluated. No residual tumor was noted at the margins of the second Mohs' layer. Histology of the specimen(s) showed no residual tumor. An accessory salivary gland, made up of well-organized secretory structures and ductules, was present at the deep margin, as noted on the Mohs map.    A total of three section(s) and 16 slide(s) were examined under the microscope via the Mohs technique.  A cancer free plane was reached after layer number two. Defect final size was as noted above.      SECOND SITE: left preauricular cheek  The first Mohs' layer consisted of two section(s) with 14 slide(s) evaluated. No residual tumor was noted at the margins of the first Mohs' layer. Histology of the specimen(s) showed changes consistent with chronic solar damage.    A total of two section(s) and 14 slide(s) were examined under the microscope via the Mohs technique.  A cancer free plane was reached after layer number one. Defect final size was as noted above.      The wounds were covered with nonadherent dressings between stages, and the patient allowed to wait in the waiting area during  these periods. The final defects were photographed at the completion of the Mohs' procedure.    The patient was returned to the procedure room following completion of the Mohs' procedure and final slide review. He is scheduled to see Oscar Evans MD for closure of the defects this afternoon as previously arranged.    Final dressing(s) consisted of Telfa and gauze and tape to the left preauricular site and Telfa, sewn in place with one #4-0 silk running locked suture along the lip margin, and gauze and tape to the left upper lip site.    Estimated blood loss for the total procedure was less than 10 mL.    Total operative time including tissue processing in the Mohs' laboratory and microscopic Mohs' frozen section slide review was 3 hour(s). Verbal and written wound care instructions were given to the patient, and he expressed understanding of these instructions. The patient tolerated the procedure well and left the operating room in good condition; he is to return PRN to me.    Dr. Guzman's cell phone number was given to the patient with instructions to call prn with any problems.

## 2022-12-21 NOTE — ANESTHESIA PROCEDURE NOTES
Intubation    Date/Time: 12/21/2022 2:34 PM  Performed by: Camilo Mcrae CRNA  Authorized by: Ameya Venegas MD     Intubation:     Induction:  Intravenous    Intubated:  Postinduction    Mask Ventilation:  Easy mask    Attempts:  1    Attempted By:  CRNA    Method of Intubation:  Video laryngoscopy    Blade:  Razo 4    Laryngeal View Grade: Grade I - full view of cords      Difficult Airway Encountered?: No      Complications:  None    Airway Device:  Oral endotracheal tube    Airway Device Size:  8.0    Style/Cuff Inflation:  Cuffed (inflated to minimal occlusive pressure)    Tube secured:  23    Secured at:  The lips    Placement Verified By:  Capnometry    Complicating Factors:  None    Findings Post-Intubation:  BS equal bilateral and atraumatic/condition of teeth unchanged

## 2022-12-21 NOTE — PROGRESS NOTES
Prior photo(s) of site(s) to confirm location(s):           Defibrillator: No  Pacemaker: No  Artificial heart valves: No  Artificial joints: No    ALLERGIES:   Iodine and Morphine      Current Outpatient Medications:     acetaminophen (TYLENOL) 500 MG tablet, Take 1 tablet (500 mg total) by mouth every 6 (six) hours as needed for Pain., Disp: , Rfl:     allopurinoL (ZYLOPRIM) 300 MG tablet, Take 300 mg by mouth once daily., Disp: , Rfl:     amLODIPine (NORVASC) 5 MG tablet, Take 5 mg by mouth once daily., Disp: , Rfl:     carvedilol (COREG) 12.5 MG tablet, Take 12.5 mg by mouth 2 (two) times daily. , Disp: , Rfl:     clopidogreL (PLAVIX) 75 mg tablet, Take 1 tablet (75 mg total) by mouth once daily., Disp: , Rfl:     fluorouraciL (EFUDEX) 5 % cream, AAA on face BID x 4-6 weeks. Stop if blistered, oozing, or bleeding. Use daily sun protection., Disp: 40 g, Rfl: 1    fluorouraciL (EFUDEX) 5 % cream, AAA bid x 2-4 weeks, Disp: 40 g, Rfl: 1    multivitamin (THERAGRAN) per tablet, Take 1 tablet by mouth Daily., Disp: , Rfl:     mycophenolate (CELLCEPT) 250 mg Cap, Take 1 capsule (250 mg total) by mouth 2 (two) times daily., Disp: 60 capsule, Rfl: 11    nitroGLYCERIN (NITROSTAT) 0.4 MG SL tablet, Place 0.4 mg under the tongue., Disp: , Rfl:     ondansetron (ZOFRAN-ODT) 4 MG TbDL, Take 4 mg by mouth., Disp: , Rfl:     pantoprazole (PROTONIX) 40 MG tablet, Take 40 mg by mouth once daily., Disp: , Rfl:     sodium bicarbonate 650 MG tablet, Take 650 mg by mouth 2 (two) times daily., Disp: , Rfl:     tacrolimus (PROGRAF) 0.5 MG Cap, Take 0.5 mg by mouth every evening., Disp: , Rfl:     tacrolimus (PROGRAF) 1 MG Cap, TAKE ONE CAPSULE BY MOUTH EVERY 12 HOURS, Disp: 60 capsule, Rfl: 11  -------------------------------------------------------------  PROCEDURE: Mohs' Micrographic Surgery to two sites    FIRST SITE: left upper lip    INDICATION: squamous cell carcinoma in an area at increased risk of recurrence    CASE NUMBER:  [id number]      ANESTHETIC: 18 mL 2% Lidocaine with Epinephrine 1:200K    SURGICAL PREP: Ethanol and Hibiclens    SURGEON: Olvin Guzman MD    ASSISTANTS: Diann Broderick CST     PREOPERATIVE DIAGNOSIS: squamous cell carcinoma     POSTOPERATIVE DIAGNOSIS: squamous cell carcinoma     PATHOLOGIC DIAGNOSIS: moderately-differentiated squamous cell carcinoma     STAGES OF MOHS' SURGERY PERFORMED: two    TUMOR-FREE PLANE ACHIEVED: yes    HEMOSTASIS: Hyfrecation     SPECIMENS: three (two in stage A, one in stage B)    INITIAL LESION SIZE: 2.1 x 2.1 cm    FINAL DEFECT SIZE: 2.1 x 3.1 cm    WOUND REPAIR/DISPOSITION: see below    ---------------------------------------    SECOND SITE: left preauricular cheek    INDICATION: basal cell carcinoma in an area at increased risk of recurrence    CASE NUMBER: [id number]      ANESTHETIC: as above    SURGICAL PREP: as above    SURGEON: Olvin Guzman MD    ASSISTANTS: as above    PREOPERATIVE DIAGNOSIS: basal cell carcinoma     POSTOPERATIVE DIAGNOSIS: basal cell carcinoma .    PATHOLOGIC DIAGNOSIS: basal cell carcinoma     STAGES OF MOHS' SURGERY PERFORMED: one    TUMOR-FREE PLANE ACHIEVED: yes    HEMOSTASIS: Hyfrecation     SPECIMENS: two (two in stage A)    INITIAL LESION SIZE: 2.2 x 2.9 cm    FINAL DEFECT SIZE: 2.0 x 3.2 cm    WOUND REPAIR/DISPOSITION: see below    NARRATIVE:    The patient is a 67 y.o.male referred by Caroline Watters M.D. with a history of cancers on the left upper lip, left preauricular cheek, and left ear which were biopsied - pathology accession #[id number],  [facility]. Findings revealed invasive squamous cell carcinoma at the left upper lip site and basal cell carcinoma at the left cheek preauricular cheek site. Examination revealed a verrucoid tumor on the left upper lip and a pink, scaly plaque on the left preauricular cheek at the sites of prior biopsies, which were confirmed by reference to the photograph(s) taken at the previous  patient visit, as attached above. In light of the nature of these tumors and the locations, Mohs' micrographic surgery was thought to be the most appropriate management choice, and these diagnoses are appropriate for treatment by Mohs' micrographic surgery.  I discussed it with the patient and he fully understands the aims, risks, alternatives, and possible complications, and elects to proceed.  There are no medical or surgical contraindications to the procedure.     Note that Mr. 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 I had originally anticipated treating this lesion via Mohs' surgery as well. However, it has clinically resolved and after Dr. Watters and I spoke about it, she recommended that we defer surgery and have him use topical 5-fluorouracil as a less invasive alternative.    Note also that he has a lesion on the right cheek which Dr. Evans is to excise today.    A signed informed consent was obtained.    PROCEDURE:  The site on the left upper lip was addressed first.   The patient was placed in the semi-recumbent position on the operating table in the Mohs' Surgery Suite.The area in question was thoroughly prepped with ethanol and Hibiclens, with particular care to avoid application to the immediate periocular skin. A sterile surgical marker was used to outline the clinically apparent margins of the involved area, and a narrow margin of normal-appearing skin. Reference marks were made at the periphery of the outlined area with the surgical marker. The proposed area of excision was measured and photographed. Local anesthesia as noted above was administered.  The total volume of anesthetic used throughout this portion of the procedure was as documented above. The area was prepared and draped in the standard manner. All of the grossly identifiable area of clinically abnormal tissue and an underlying/peripheral layer was taken and processed by the Mohs' technique.   "Hemostasis was obtained with the hyfrecator. Tissue was taken from any areas of residual marginal involvement (if present) and processed by the Mohs' technique in as many stages as needed until a tumor-free plane was achieved.    Next, the site on the left preauricular cheek was addressed.   The patient was placed in the semi-recumbent position on the operating table in the Mohs' Surgery Suite.The area in question was thoroughly prepped with ethanol and Hibiclens, with particular care to avoid application to the immediate periocular skin. A sterile surgical marker was used to outline the clinically apparent margins of the involved area, and a narrow margin of normal-appearing skin. Reference marks were made at the periphery of the outlined area with the surgical marker. The proposed area of excision was measured and photographed. Local anesthesia as noted above was administered.  The total volume of anesthetic used throughout this portion of the procedure was as documented above. The area was prepared and draped in the standard manner. All of the grossly identifiable area of clinically abnormal tissue and an underlying/peripheral layer was taken and processed by the Mohs' technique.  Hemostasis was obtained with the hyfrecator. Tissue was taken from any areas of residual marginal involvement (if present) and processed by the Mohs' technique in as many stages as needed until a tumor-free plane was achieved.    Colors of inks used in the reference nicks at epidermal margins (if present) and/or inking of non-epithelial edges, if applicable, is represented on the Mohs map as follows: solid lines represent red ink, dots represent blue ink, jagged lines represent black ink, curlicues represent green ink, "xxx" represents yellow ink.    FIRST SITE: left upper lip  The first Mohs' layer consisted of two section(s) with 10 slide(s) evaluated. Some residual tumor was noted at the margins of the first Mohs' layer. Histology of " the specimen(s) showed invasive squamous cell carcinoma, well-differentiated, manifested as nests of atypical epithelioid cells with nuclear pleomorphism of the keratinocytes and a high degree of differentiation at the deep margin on section two along the cut edge, as noted on the Mohs map; in addition, a focus of infiltrative squamous cell carcinoma was noted in the superficial dermis near the red edge, as noted on the Mohs map; in addition, on later cuts, squamous cell carcinoma was noted at the deep margin on section one, and foci of infiltrative squamous cell carcinoma were noted in the superficial dermis near the blue-inked edge, as noted on the Mohs' map; multiple earlier cuts were clear in the latter areas and actual surgical margins were assessed as clear in these areas.      The second Mohs' layer consisted of one section(s) with 6 slide(s) evaluated. No residual tumor was noted at the margins of the second Mohs' layer. Histology of the specimen(s) showed no residual tumor. An accessory salivary gland, made up of well-organized secretory structures and ductules, was present at the deep margin, as noted on the Mohs map.    A total of three section(s) and 16 slide(s) were examined under the microscope via the Mohs technique.  A cancer free plane was reached after layer number two. Defect final size was as noted above.      SECOND SITE: left preauricular cheek  The first Mohs' layer consisted of two section(s) with 14 slide(s) evaluated. No residual tumor was noted at the margins of the first Mohs' layer. Histology of the specimen(s) showed changes consistent with chronic solar damage.    A total of two section(s) and 14 slide(s) were examined under the microscope via the Mohs technique.  A cancer free plane was reached after layer number one. Defect final size was as noted above.      The wounds were covered with nonadherent dressings between stages, and the patient allowed to wait in the waiting area during  these periods. The final defects were photographed at the completion of the Mohs' procedure.    The patient was returned to the procedure room following completion of the Mohs' procedure and final slide review. He is scheduled to see Oscar Evans MD for closure of the defects this afternoon as previously arranged.    Final dressing(s) consisted of Telfa and gauze and tape to the left preauricular site and Telfa, sewn in place with one #4-0 silk running locked suture along the lip margin, and gauze and tape to the left upper lip site.    Estimated blood loss for the total procedure was less than 10 mL.    Total operative time including tissue processing in the Mohs' laboratory and microscopic Mohs' frozen section slide review was 3 hour(s). Verbal and written wound care instructions were given to the patient, and he expressed understanding of these instructions. The patient tolerated the procedure well and left the operating room in good condition; he is to return PRN to me.    Dr. Guzman's cell phone number was given to the patient with instructions to call prn with any problems.

## 2022-12-21 NOTE — BRIEF OP NOTE
[facility] - [facility]  Brief Operative Note     SUMMARY     Surgery Date: 12/21/2022     Surgeon(s) and Role:     * Oscar Sandoval MD - Primary    Assisting Surgeon: None    Pre-op Diagnosis:  Squamous cell carcinoma of skin of upper lip [C44.02]  S/p cadaver renal transplant [Z94.0]  Need for prophylactic immunotherapy [Z29.8]  Basal cell carcinoma (BCC) of left preauricular region [C44.319]  Actinic keratosis [L57.0]    Post-op Diagnosis:  Post-Op Diagnosis Codes:     * Squamous cell carcinoma of skin of upper lip [C44.02]     * S/p cadaver renal transplant [Z94.0]     * Need for prophylactic immunotherapy [Z29.8]     * Basal cell carcinoma (BCC) of left preauricular region [C44.319]     * Actinic keratosis [L57.0]    Procedure(s) (LRB):  CLOSURE-WOUND LEFT FACE. LIP (N/A)  EXCISION, LESION, FACE (Right)    Anesthesia: General/MAC    Description of the findings of the procedure: Moh's reconstruction    Findings/Key Components: Moh's reconstruction    Estimated Blood Loss: * No values recorded between 12/21/2022  2:57 PM and 12/21/2022  3:59 PM *         Specimens:   Specimen (24h ago, onward)       Start     Ordered    12/21/22 1527  Specimen to Pathology, Surgery ENT  Once        Comments: Pre-op Diagnosis: Squamous cell carcinoma of skin of upper lip [C44.02]S/p cadaver renal transplant [Z94.0]Need for prophylactic immunotherapy [Z29.8]Basal cell carcinoma (BCC) of left preauricular region [C44.319]Actinic keratosis [L57.0]Procedure(s):CLOSURE-WOUNDEXCISION, LESION, FACE Number of specimeN-1Name of specimens: 1. 1.RIGHT PREAURICULAR LESION (STITCH SUPERIOR)     References:    Click here for ordering Quick Tip   Question Answer Comment   Procedure Type: ENT    Specimen Class: Known or suspected malignancy    Which provider would you like to cc? OSCAR SANDOVAL    Release to patient Immediate        12/21/22 1528                    Discharge Note    SUMMARY     Admit Date: 12/21/2022    Discharge Date and Time:   2022 3:59 PM    Hospital Course (synopsis of major diagnoses, care, treatment, and services provided during the course of the hospital stay): Did well following surgery and was discharged uneventfully     Final Diagnosis: Post-Op Diagnosis Codes:     * Squamous cell carcinoma of skin of upper lip [C44.02]     * S/p cadaver renal transplant [Z94.0]     * Need for prophylactic immunotherapy [Z29.8]     * Basal cell carcinoma (BCC) of left preauricular region [C44.319]     * Actinic keratosis [L57.0]    Disposition: Home or Self Care    Follow Up/Patient Instructions: Regular diet, Follow-up 1 week. Activity light    Medications:  Reconciled Home Medications:   Current Discharge Medication List        CONTINUE these medications which have NOT CHANGED    Details   acetaminophen (TYLENOL) 500 MG tablet Take 1 tablet (500 mg total) by mouth every 6 (six) hours as needed for Pain.      allopurinoL (ZYLOPRIM) 300 MG tablet Take 300 mg by mouth once daily.      amLODIPine (NORVASC) 5 MG tablet Take 5 mg by mouth once daily.      carvedilol (COREG) 12.5 MG tablet Take 12.5 mg by mouth 2 (two) times daily.       clopidogreL (PLAVIX) 75 mg tablet Take 1 tablet (75 mg total) by mouth once daily.      multivitamin (THERAGRAN) per tablet Take 1 tablet by mouth Daily.      mycophenolate (CELLCEPT) 250 mg Cap Take 1 capsule (250 mg total) by mouth 2 (two) times daily.  Qty: 60 capsule, Refills: 11    Comments: Txp Date:2010 (Kidney) Disch Date:2010 ICD10:Z94.0 Txp Location:[facility] Patient to hold dose from today 2021 and restart on 2021. Patient to have fecal transplant.  Associated Diagnoses: -donor kidney transplant; Need for prophylactic immunotherapy      pantoprazole (PROTONIX) 40 MG tablet Take 40 mg by mouth once daily.      sodium bicarbonate 650 MG tablet Take 650 mg by mouth 2 (two) times daily.      !! tacrolimus (PROGRAF) 0.5 MG Cap Take 0.5 mg by mouth every evening.      !! tacrolimus  (PROGRAF) 1 MG Cap TAKE ONE CAPSULE BY MOUTH EVERY 12 HOURS  Qty: 60 capsule, Refills: 11      !! fluorouraciL (EFUDEX) 5 % cream AAA on face BID x 4-6 weeks. Stop if blistered, oozing, or bleeding. Use daily sun protection.  Qty: 40 g, Refills: 1    Associated Diagnoses: Squamous cell carcinoma in situ      !! fluorouraciL (EFUDEX) 5 % cream AAA bid x 2-4 weeks  Qty: 40 g, Refills: 1    Associated Diagnoses: Squamous cell carcinoma in situ (SCCIS) of scalp; AK (actinic keratosis)      nitroGLYCERIN (NITROSTAT) 0.4 MG SL tablet Place 0.4 mg under the tongue.      ondansetron (ZOFRAN-ODT) 4 MG TbDL Take 4 mg by mouth.       !! - Potential duplicate medications found. Please discuss with provider.        No discharge procedures on file.

## 2022-12-21 NOTE — TRANSFER OF CARE
"Anesthesia Transfer of Care Note    Patient: Raymundo Restrepo    Procedure(s) Performed: Procedure(s) (LRB):  CLOSURE-WOUND LEFT FACE. LIP (N/A)  EXCISION, LESION, FACE (Right)    Patient location: PACU    Anesthesia Type: general    Transport from OR: Transported from OR on room air with adequate spontaneous ventilation    Post pain: adequate analgesia    Post assessment: no apparent anesthetic complications and tolerated procedure well    Post vital signs: stable    Level of consciousness: sedated and awake    Nausea/Vomiting: no nausea/vomiting    Complications: none    Transfer of care protocol was followed      Last vitals:   Visit Vitals  BP (!) 184/88 (BP Location: Right arm, Patient Position: Lying)   Pulse (!) 54   Temp 36.7 °C (98.1 °F) (Skin)   Resp 18   Ht 5' 6" (1.676 m)   Wt 79.8 kg (176 lb)   SpO2 100%   BMI 28.41 kg/m²     "

## 2022-12-21 NOTE — ANESTHESIA PREPROCEDURE EVALUATION
12/21/2022  Raymundo Restrepo is a 67 y.o., male.    Pre-op Assessment    I have reviewed the Patient Summary Reports.    I have reviewed the Nursing Notes. I have reviewed the NPO Status.   I have reviewed the Medications.     Review of Systems  Anesthesia Hx:  No problems with previous Anesthesia  History of prior surgery of interest to airway management or planning:  Denies Personal Hx of Anesthesia complications.   Social:  Non-Smoker    Hematology/Oncology:  Hematology Normal       -- Cancer in past history:    EENT/Dental:EENT/Dental Normal   Cardiovascular:   Exercise tolerance: good Hypertension CAD asymptomatic CABG/stent   Denies Angina.  Denies PARSONS.    Pulmonary:   Denies Shortness of breath.    Renal/:   Chronic Renal Disease Kidney transplant 2010   Hepatic/GI:   Hiatal Hernia, GERD, well controlled Dysphagia; feels well today.   Musculoskeletal:  Musculoskeletal Normal    Neurological:   TIA,    Endocrine:  Endocrine Normal    Dermatological:  Skin Normal    Psych:  Psychiatric Normal           Physical Exam  General:  Well nourished      Airway/Jaw/Neck:  Airway Findings: Mouth Opening: Normal   Tongue: Normal   General Airway Assessment: Adult Oropharynx Findings: Normal Mallampati: II  Improves to II with phonation.  TM Distance: Normal, at least 6 cm   Neck Findings: Normal     Dental:  Dental Findings: In tact     Chest/Lungs:  Chest/Lungs Findings: Normal Respiratory Rate, Clear to auscultation      Heart/Vascular:  Heart Findings: Rate: Normal  Rhythm: Regular Rhythm  Sounds: Normal  Heart murmur: negative    Abdomen:  Abdomen Findings:     Musculoskeletal:  Musculoskeletal Findings:    Skin:  Skin Findings:     Mental Status:  Mental Status Findings:  Cooperative, Alert and Oriented         Anesthesia Plan  Type of Anesthesia, risks & benefits discussed:  Anesthesia Type:   general    Patient's Preference:   Plan Factors:          Intra-op Monitoring Plan: standard ASA monitors  Intra-op Monitoring Plan Comments:   Post Op Pain Control Plan: multimodal analgesia  Post Op Pain Control Plan Comments:     Induction:   IV  Beta Blocker:  Patient is on a Beta-Blocker and has received one dose within the past 24 hours (No further documentation required).       Informed Consent: Informed consent signed with the Patient and all parties understand the risks and agree with anesthesia plan.  All questions answered.  Anesthesia consent signed with patient.  ASA Score: 3     Day of Surgery Review of History & Physical:              Ready For Surgery From Anesthesia Perspective.           Physical Exam  General: Well nourished    Airway:  Mallampati: II / II  Mouth Opening: Normal  TM Distance: Normal, at least 6 cm  Tongue: Normal    Dental:  In tact    Chest/Lungs:  Normal Respiratory Rate, Clear to auscultation    Heart:  Rate: Normal  Rhythm: Regular Rhythm  Sounds: Normal          Anesthesia Plan  Type of Anesthesia, risks & benefits discussed:    Anesthesia Type: general  Intra-op Monitoring Plan: standard ASA monitors  Post Op Pain Control Plan: multimodal analgesia  Induction:  IV  Informed Consent: Informed consent signed with the Patient and all parties understand the risks and agree with anesthesia plan.  All questions answered.   ASA Score: 3    Ready For Surgery From Anesthesia Perspective.       .

## 2022-12-22 VITALS
SYSTOLIC BLOOD PRESSURE: 162 MMHG | TEMPERATURE: 98 F | HEIGHT: 66 IN | DIASTOLIC BLOOD PRESSURE: 82 MMHG | OXYGEN SATURATION: 98 % | HEART RATE: 70 BPM | BODY MASS INDEX: 28.28 KG/M2 | RESPIRATION RATE: 18 BRPM | WEIGHT: 176 LBS

## 2022-12-22 NOTE — ANESTHESIA POSTPROCEDURE EVALUATION
Anesthesia Post Evaluation    Patient: Raymundo Restrepo    Procedure(s) Performed: Procedure(s) (LRB):  CLOSURE-WOUND LEFT FAC, and LIP (N/A)  EXCISION, LESION, FACE (Right)    Final Anesthesia Type: general      Patient location during evaluation: PACU  Patient participation: Yes- Able to Participate  Level of consciousness: awake and alert and oriented  Post-procedure vital signs: reviewed and stable  Pain management: adequate  Airway patency: patent    PONV status at discharge: No PONV  Anesthetic complications: no      Cardiovascular status: blood pressure returned to baseline  Respiratory status: unassisted, spontaneous ventilation and room air  Hydration status: euvolemic  Follow-up not needed.          Vitals Value Taken Time   /82 12/21/22 1700   Temp 36.5 °C (97.7 °F) 12/21/22 1600   Pulse 70 12/21/22 1700   Resp 18 12/21/22 1700   SpO2 98 % 12/21/22 1700         Event Time   Out of Recovery 16:50:00         Pain/Red Score: Red Score: 9 (12/21/2022  4:28 PM)

## 2022-12-27 NOTE — OP NOTE
12/21/2022     Raymundo Restrepo  [id number]  1955    PRE-OPERATIVE DIAGNOSIS  1. Squamous cell carcinoma of skin of upper lip        POST-OPERATIVE DIAGNOSIS  1. Squamous cell carcinoma of skin of upper lip        PROCEDURES PERFORMED  Excision of benign skin left upper lip 1.5 cm  Full thickness / complex closure of left upper lip wedge defect 2.1 x 3.1 cm  Layered closure of left pre-auricular skin defect 2.0 x 3.0  Excision of benign right pre-auricular skin lesion 2.5 cm with layered wound closure    SURGEON: Oscar Evans MD    ASSISTANT: None    ANESTHESIA: General    COMPLICATIONS: None    BLOOD LOSS: minimal    SPECIMENS  Right pre-auricular skin excision    DISPOSITION  PACU    OPERATIVE FINDINGS  Defect of the left pre-auricular region. Method of closure: layered closure  Defect of the left upper lip involving skin and muscle. I had to excise lip mucosa in order to prevent redundancy  Right skin lesion in pre-auricular region, previously biopsied as CRATERIFORM SQUAMOPROLIFERATIVE LESION.     INDICATIONS  Raymundo Restrepo is a 67 y.o. male who was seen in clinic for evaluation of the above diagnosis. he underwent Moh's microsurgical excision this morning. he presented to me for reconstructive efforts. Based on clinical assessment, the following procedures were discussed as an option for treatment. I discussed the reconstructive ladder as well as risks of the procedure including scar, cosmetic deformity, change in appearance, asymmetry, wound healing issues/skin breakdown, hematoma, infection. Facial weakness/paralysis, graft failure. After risks, benefits, and alternatives were discussed the patient decided to proceed.    PROCEDURE IN DETAIL  Consent was signed. A timeout was performed. Anesthesia was induced. The wounds were cleaned with Betadine.    I began with the right pre-auricular skin lesion.    I marked a narrow margin around it and injected 1% Lidocaine and 0.5% Marcaine with 1:100,000 epinephrine. A  wedge excision was prepared to allow for a clean closure. It had been prepped in a sterile fashion. I proceeded with excision of the lesion, with the deep aspect overlying the parotid fascia. The lesion was excised en bloc and sent for pathology. Hemostasis was achieved. I elevated the skin edges and re-pproximated the wound with 4-0 Monocryl and 5-0 plain gut.    I then moved on to the left pre-auricular region. I  examined the defect. Findings are indicated above. The edges of the defect were freshened and measured. The planned incision was injected with 1% Lidocaine with 1:100,000 epinephrine. I elevated the skin on both sides of the defect and advanced the edges together. Standing tissue cones were excised on each side and the wound was closure with 4-0 Monocryl and 5-0 plain gut.    I then moved onto the lip defect. This was a large defect of the upper left lip, but it involved about 1/3 of the total lip. The defect included skin, muscle but spared the mucosa. Because I planned a primary closure, I did need to excised about 1.5 cm of lip mucosa in order to prevent redundancy. I then  the muscle from the skin and mucosa to allow for a layered closure. I first closed the mucosa with 4-0 chromic. I then closed the muscle and deeper soft tissues with a 4-0 Monocryl. I finally closed the skin with 4-0 Plain gut.     Antibiotic ointment was applied.    This marked the end of the procedure. The patient was turned back over to the Anesthesia team.  They were awoken and transported back to the PACU in stable condition. Counts were correct. I performed the entire procedure.

## 2022-12-29 LAB
FINAL PATHOLOGIC DIAGNOSIS: NORMAL
Lab: NORMAL

## 2022-12-30 ENCOUNTER — TELEPHONE (OUTPATIENT)
Dept: CARDIOLOGY | Facility: CLINIC | Age: 67
End: 2022-12-30

## 2022-12-30 NOTE — TELEPHONE ENCOUNTER
----- Message from Oscar Evans MD sent at 12/30/2022  6:17 AM CST -----  Raymundo, the area in front of the right ear was benign, but did have aggressive features. It was completely removed though, so hopefully this is the last time you deal with it.    Oscar Evans MD  Otolaryngology - Head and Neck Surgery  Office: [phone]  Cell: [phone]  Fax: [phone]    This message was generated using voice dictation. Please excuse any errors that may have been created by the transcription software.

## 2023-01-11 ENCOUNTER — OFFICE VISIT (OUTPATIENT)
Dept: OTOLARYNGOLOGY | Facility: CLINIC | Age: 68
End: 2023-01-11
Payer: MEDICARE

## 2023-01-11 VITALS — WEIGHT: 175.94 LBS | HEIGHT: 66 IN | BODY MASS INDEX: 28.27 KG/M2

## 2023-01-11 DIAGNOSIS — Z98.890 POST-OPERATIVE STATE: Primary | ICD-10-CM

## 2023-01-11 PROCEDURE — 99999 PR PBB SHADOW E&M-EST. PATIENT-LVL III: ICD-10-PCS | Mod: PBBFAC,,, | Performed by: OTOLARYNGOLOGY

## 2023-01-11 PROCEDURE — 1101F PT FALLS ASSESS-DOCD LE1/YR: CPT | Mod: CPTII,S$GLB,, | Performed by: OTOLARYNGOLOGY

## 2023-01-11 PROCEDURE — 1159F PR MEDICATION LIST DOCUMENTED IN MEDICAL RECORD: ICD-10-PCS | Mod: CPTII,S$GLB,, | Performed by: OTOLARYNGOLOGY

## 2023-01-11 PROCEDURE — 3288F FALL RISK ASSESSMENT DOCD: CPT | Mod: CPTII,S$GLB,, | Performed by: OTOLARYNGOLOGY

## 2023-01-11 PROCEDURE — 99999 PR PBB SHADOW E&M-EST. PATIENT-LVL III: CPT | Mod: PBBFAC,,, | Performed by: OTOLARYNGOLOGY

## 2023-01-11 PROCEDURE — 3008F BODY MASS INDEX DOCD: CPT | Mod: CPTII,S$GLB,, | Performed by: OTOLARYNGOLOGY

## 2023-01-11 PROCEDURE — 3288F PR FALLS RISK ASSESSMENT DOCUMENTED: ICD-10-PCS | Mod: CPTII,S$GLB,, | Performed by: OTOLARYNGOLOGY

## 2023-01-11 PROCEDURE — 99024 PR POST-OP FOLLOW-UP VISIT: ICD-10-PCS | Mod: S$GLB,,, | Performed by: OTOLARYNGOLOGY

## 2023-01-11 PROCEDURE — 1126F AMNT PAIN NOTED NONE PRSNT: CPT | Mod: CPTII,S$GLB,, | Performed by: OTOLARYNGOLOGY

## 2023-01-11 PROCEDURE — 1159F MED LIST DOCD IN RCRD: CPT | Mod: CPTII,S$GLB,, | Performed by: OTOLARYNGOLOGY

## 2023-01-11 PROCEDURE — 1101F PR PT FALLS ASSESS DOC 0-1 FALLS W/OUT INJ PAST YR: ICD-10-PCS | Mod: CPTII,S$GLB,, | Performed by: OTOLARYNGOLOGY

## 2023-01-11 PROCEDURE — 3008F PR BODY MASS INDEX (BMI) DOCUMENTED: ICD-10-PCS | Mod: CPTII,S$GLB,, | Performed by: OTOLARYNGOLOGY

## 2023-01-11 PROCEDURE — 1160F RVW MEDS BY RX/DR IN RCRD: CPT | Mod: CPTII,S$GLB,, | Performed by: OTOLARYNGOLOGY

## 2023-01-11 PROCEDURE — 1126F PR PAIN SEVERITY QUANTIFIED, NO PAIN PRESENT: ICD-10-PCS | Mod: CPTII,S$GLB,, | Performed by: OTOLARYNGOLOGY

## 2023-01-11 PROCEDURE — 1160F PR REVIEW ALL MEDS BY PRESCRIBER/CLIN PHARMACIST DOCUMENTED: ICD-10-PCS | Mod: CPTII,S$GLB,, | Performed by: OTOLARYNGOLOGY

## 2023-01-11 PROCEDURE — 99024 POSTOP FOLLOW-UP VISIT: CPT | Mod: S$GLB,,, | Performed by: OTOLARYNGOLOGY

## 2023-01-11 NOTE — LETTER
January 12, 2023      Caroline Watters MD  [address] [facility] [address] [place] [address]           [facility]  [address] [place] [address]  Phone: [phone]  Fax: [phone]          Patient: Raymundo Restrepo   MR Number: [id number]   YOB: 1955   Date of Visit: 1/11/2023       Dear No ref. provider found:    Thank you for referring Raymundo Restrepo to me for evaluation. Attached you will find relevant portions of my assessment and plan of care.    If you have questions, please do not hesitate to call me. I look forward to following Raymundo Restrepo along with you.    Sincerely,    Oscar Evans MD    Enclosure  CC:    No Recipients    If you would like to receive this communication electronically, please contact [email or web address] or [phone] to request more information on [facility] access.    For providers and/or their staff who would like to refer a patient to [facility], please contact us through our one-stop-shop provider referral line, Jevon Perez, at [phone].    If you feel you have received this communication in error or would no longer like to receive these types of communications, please e-mail [email or web address]

## 2023-01-12 NOTE — PROGRESS NOTES
Raymundo is here for a post-operative visit following     PROCEDURES PERFORMED  Excision of benign skin left upper lip 1.5 cm  Full thickness / complex closure of left upper lip wedge defect 2.1 x 3.1 cm  Layered closure of left pre-auricular skin defect 2.0 x 3.0  Excision of benign right pre-auricular skin lesion 2.5 cm with layered wound closure    No issues, doing well  Pain controlled    Exam:  Alert, active, appropriate  Incision c/d/i, wound with appropriate mild edema, mild erythema    RELIAPATH DIAGNOSIS:   RIGHT PREAURICULAR SKIN, EXCISION:   - Inflamed hyperkeratotic actinic keratosis, involving adnexal structures   with focal areas worrisome for superficial invasion, completely excised.     Healing well  Massage lip  Follow-up prn

## 2023-01-25 NOTE — PROGRESS NOTES
1. S/p EDC, adjuvant Efudex.   2. S/p excision per ENT.   3. S/p Mohs with ENT closure.   4. S/p Efudex.   5. S/p Mohs with ENT closure.

## 2023-02-24 ENCOUNTER — PES CALL (OUTPATIENT)
Dept: ADMINISTRATIVE | Facility: CLINIC | Age: 68
End: 2023-02-24

## 2023-03-01 ENCOUNTER — OFFICE VISIT (OUTPATIENT)
Dept: DERMATOLOGY | Facility: CLINIC | Age: 68
End: 2023-03-01
Payer: MEDICARE

## 2023-03-01 VITALS — WEIGHT: 175 LBS | BODY MASS INDEX: 28.12 KG/M2 | HEIGHT: 66 IN

## 2023-03-01 DIAGNOSIS — L57.0 AK (ACTINIC KERATOSIS): ICD-10-CM

## 2023-03-01 DIAGNOSIS — L82.1 SK (SEBORRHEIC KERATOSIS): ICD-10-CM

## 2023-03-01 DIAGNOSIS — D48.5 NEOPLASM OF UNCERTAIN BEHAVIOR OF SKIN: ICD-10-CM

## 2023-03-01 DIAGNOSIS — D84.9 IMMUNOCOMPROMISED STATE: ICD-10-CM

## 2023-03-01 DIAGNOSIS — L73.8 SEBACEOUS HYPERPLASIA: ICD-10-CM

## 2023-03-01 DIAGNOSIS — L81.4 LENTIGINES: ICD-10-CM

## 2023-03-01 DIAGNOSIS — D22.9 MULTIPLE BENIGN NEVI: ICD-10-CM

## 2023-03-01 DIAGNOSIS — Z12.83 SCREENING FOR SKIN CANCER: ICD-10-CM

## 2023-03-01 DIAGNOSIS — D18.01 CHERRY ANGIOMA: ICD-10-CM

## 2023-03-01 DIAGNOSIS — Z85.828 HISTORY OF NONMELANOMA SKIN CANCER: ICD-10-CM

## 2023-03-01 DIAGNOSIS — C44.229 SCC (SQUAMOUS CELL CARCINOMA), EAR, LEFT: Primary | ICD-10-CM

## 2023-03-01 PROCEDURE — 99999 PR PBB SHADOW E&M-EST. PATIENT-LVL III: ICD-10-PCS | Mod: PBBFAC,,, | Performed by: DERMATOLOGY

## 2023-03-01 PROCEDURE — 88305 TISSUE EXAM BY PATHOLOGIST: ICD-10-PCS | Mod: 26,,, | Performed by: PATHOLOGY

## 2023-03-01 PROCEDURE — 88305 TISSUE EXAM BY PATHOLOGIST: CPT | Mod: 26,,, | Performed by: PATHOLOGY

## 2023-03-01 PROCEDURE — 17003 DESTRUCT PREMALG LES 2-14: CPT | Mod: S$GLB,,, | Performed by: DERMATOLOGY

## 2023-03-01 PROCEDURE — 3288F PR FALLS RISK ASSESSMENT DOCUMENTED: ICD-10-PCS | Mod: CPTII,S$GLB,, | Performed by: DERMATOLOGY

## 2023-03-01 PROCEDURE — 1126F AMNT PAIN NOTED NONE PRSNT: CPT | Mod: CPTII,S$GLB,, | Performed by: DERMATOLOGY

## 2023-03-01 PROCEDURE — 69100 PR BIOPSY OF EXTERNAL EAR: ICD-10-PCS | Mod: S$GLB,,, | Performed by: DERMATOLOGY

## 2023-03-01 PROCEDURE — 3288F FALL RISK ASSESSMENT DOCD: CPT | Mod: CPTII,S$GLB,, | Performed by: DERMATOLOGY

## 2023-03-01 PROCEDURE — 69100 BIOPSY OF EXTERNAL EAR: CPT | Mod: S$GLB,,, | Performed by: DERMATOLOGY

## 2023-03-01 PROCEDURE — 1101F PR PT FALLS ASSESS DOC 0-1 FALLS W/OUT INJ PAST YR: ICD-10-PCS | Mod: CPTII,S$GLB,, | Performed by: DERMATOLOGY

## 2023-03-01 PROCEDURE — 17000 DESTRUCT PREMALG LESION: CPT | Mod: XS,S$GLB,, | Performed by: DERMATOLOGY

## 2023-03-01 PROCEDURE — 1101F PT FALLS ASSESS-DOCD LE1/YR: CPT | Mod: CPTII,S$GLB,, | Performed by: DERMATOLOGY

## 2023-03-01 PROCEDURE — 99999 PR PBB SHADOW E&M-EST. PATIENT-LVL III: CPT | Mod: PBBFAC,,, | Performed by: DERMATOLOGY

## 2023-03-01 PROCEDURE — 17000 PR DESTRUCTION(LASER SURGERY,CRYOSURGERY,CHEMOSURGERY),PREMALIGNANT LESIONS,FIRST LESION: ICD-10-PCS | Mod: XS,S$GLB,, | Performed by: DERMATOLOGY

## 2023-03-01 PROCEDURE — 99213 PR OFFICE/OUTPT VISIT, EST, LEVL III, 20-29 MIN: ICD-10-PCS | Mod: 25,S$GLB,, | Performed by: DERMATOLOGY

## 2023-03-01 PROCEDURE — 11102 TANGNTL BX SKIN SINGLE LES: CPT | Mod: XS,S$GLB,, | Performed by: DERMATOLOGY

## 2023-03-01 PROCEDURE — 11102 PR TANGENTIAL BIOPSY, SKIN, SINGLE LESION: ICD-10-PCS | Mod: XS,S$GLB,, | Performed by: DERMATOLOGY

## 2023-03-01 PROCEDURE — 99499 RISK ADDL DX/OHS AUDIT: ICD-10-PCS | Mod: S$PBB,,, | Performed by: DERMATOLOGY

## 2023-03-01 PROCEDURE — 1159F PR MEDICATION LIST DOCUMENTED IN MEDICAL RECORD: ICD-10-PCS | Mod: CPTII,S$GLB,, | Performed by: DERMATOLOGY

## 2023-03-01 PROCEDURE — 17003 DESTRUCTION, PREMALIGNANT LESIONS; SECOND THROUGH 14 LESIONS: ICD-10-PCS | Mod: S$GLB,,, | Performed by: DERMATOLOGY

## 2023-03-01 PROCEDURE — 99213 OFFICE O/P EST LOW 20 MIN: CPT | Mod: 25,S$GLB,, | Performed by: DERMATOLOGY

## 2023-03-01 PROCEDURE — 1159F MED LIST DOCD IN RCRD: CPT | Mod: CPTII,S$GLB,, | Performed by: DERMATOLOGY

## 2023-03-01 PROCEDURE — 88305 TISSUE EXAM BY PATHOLOGIST: CPT | Mod: 59 | Performed by: PATHOLOGY

## 2023-03-01 PROCEDURE — 3008F PR BODY MASS INDEX (BMI) DOCUMENTED: ICD-10-PCS | Mod: CPTII,S$GLB,, | Performed by: DERMATOLOGY

## 2023-03-01 PROCEDURE — 3008F BODY MASS INDEX DOCD: CPT | Mod: CPTII,S$GLB,, | Performed by: DERMATOLOGY

## 2023-03-01 PROCEDURE — 1126F PR PAIN SEVERITY QUANTIFIED, NO PAIN PRESENT: ICD-10-PCS | Mod: CPTII,S$GLB,, | Performed by: DERMATOLOGY

## 2023-03-01 PROCEDURE — 99499 UNLISTED E&M SERVICE: CPT | Mod: S$PBB,,, | Performed by: DERMATOLOGY

## 2023-03-01 NOTE — PROGRESS NOTES
"  Subjective:       Patient ID:  Raymundo Restrepo is a 68 y.o. male who presents for   Chief Complaint   Patient presents with    Skin Check     ubsc    Spot     Temples and left jawline     HPI  Established patient.  Here today for upper body skin exam.    Immunosuppressed: hx kidney transplant 2010, maintained on Prograf and Cellcept.  Hx of NMSC as below.   C/o spots at L jawline, backs of hands / forearms.     Derm Hx:    SCC, moderately differentiated, at L upper lip s/p Mohs 12/2022   BCC at L preauricular s/p Mohs 12/2022  SCC is at L ear s/p Efudex 4 week course in 11-12/2022  Persistent "crateriform squamoproliferative lesion" at R preauricular [superior] s/p WLE with ENT 12/2022 (neg margins); prior LN2, Efudex failure  SCC is (focal) arising within HAK s/p EDC and subsequent Efudex course 8/2022  SCC is at L dorsal hand s/p EDC 8/2023  SCC is at R preauricular cheek (inferior) s/p Mohs   SCC is at L neck  SCC is at L posterior neck  SCC is at L dorsal wrist s/p EDC    Review of Systems   Skin:  Positive for activity-related sunscreen use. Negative for daily sunscreen use and wears hat.   Hematologic/Lymphatic: Bruises/bleeds easily.      Objective:    Physical Exam   Constitutional: He appears well-developed and well-nourished.   Neurological: He is alert and oriented to person, place, and time.   Psychiatric: He has a normal mood and affect.   Skin:   Areas Examined (abnormalities noted in diagram):   Scalp / Hair Palpated and Inspected  Head / Face Inspection Performed  Neck Inspection Performed  Chest / Axilla Inspection Performed  Abdomen Inspection Performed  Back Inspection Performed  RUE Inspected  LUE Inspection Performed                     Diagram Legend     Erythematous scaling macule/papule c/w actinic keratosis       Vascular papule c/w angioma      Pigmented verrucoid papule/plaque c/w seborrheic keratosis      Yellow umbilicated papule c/w sebaceous hyperplasia      Irregularly shaped tan " macule c/w lentigo     1-2 mm smooth white papules consistent with Milia      Movable subcutaneous cyst with punctum c/w epidermal inclusion cyst      Subcutaneous movable cyst c/w pilar cyst      Firm pink to brown papule c/w dermatofibroma      Pedunculated fleshy papule(s) c/w skin tag(s)      Evenly pigmented macule c/w junctional nevus     Mildly variegated pigmented, slightly irregular-bordered macule c/w mildly atypical nevus      Flesh colored to evenly pigmented papule c/w intradermal nevus       Pink pearly papule/plaque c/w basal cell carcinoma      Erythematous hyperkeratotic cursted plaque c/w SCC      Surgical scar with no sign of skin cancer recurrence      Open and closed comedones      Inflammatory papules and pustules      Verrucoid papule consistent consistent with wart     Erythematous eczematous patches and plaques     Dystrophic onycholytic nail with subungual debris c/w onychomycosis     Umbilicated papule    Erythematous-base heme-crusted tan verrucoid plaque consistent with inflamed seborrheic keratosis     Erythematous Silvery Scaling Plaque c/w Psoriasis     See annotation              Assessment / Plan:      Pathology Orders:       Normal Orders This Visit    Specimen to Pathology, Dermatology     Comments:    Number of Specimens:->2  ------------------------->-------------------------  Spec 1 Procedure:->Biopsy  Spec 1 Clinical Impression:->r/o recurrent SCC is (hx bx,  efudex bidx 4 wk)  Spec 1 Source:->L ear  ------------------------->-------------------------  Spec 2 Procedure:->Biopsy  Spec 2 Clinical Impression:->r/o SCC vs hak  Spec 2 Source:->L dorsal hand  Release to patient->Immediate    Questions:    Procedure Type: Dermatology and skin neoplasms    Number of Specimens: 2    ------------------------: -------------------------    Spec 1 Procedure: Biopsy    Spec 1 Clinical Impression: r/o recurrent SCC is (hx bx, efudex bidx 4 wk)    Spec 1 Source: L ear     ------------------------: -------------------------    Spec 2 Procedure: Biopsy    Spec 2 Clinical Impression: r/o SCC vs hak    Spec 2 Source: L dorsal hand    Release to patient: Immediate            Neoplasm of uncertain behavior of skin - R dorsal hand  -     Specimen to Pathology, Dermatology  - Discussed diagnosis with patient and explained suspicious nature of condition, including ddx.   - Discussed treatment options (biopsy +/- EDC, closing monitoring) with patient, including the risks and benefits of each. Patient opted to pursue biopsy followed by EDC  - Shave Biopsy Procedure Note: Discussed procedure with patient/patient's guardian including risks and benefits as well as treatment alternatives. Risks of procedure include pain, bleeding, infection, post-inflammatory pigmentary alteration, scar, recurrence. Patient informed that the purpose of a biopsy is sampling of condition in question rather than removal in entirety; further treatment may be necessary. Verbal consent obtained. Area to be biopsied marked and cleansed with alcohol. Local anesthesia achieved by injecting approximately 1 cc of 1% lidocaine with epinephrine. A double edge razor blade used for biopsy; specimen submitted to pathology. Patient tolerated procedure well.   FOLLOWED IMMEDIATELY BY  - Electrodessication and Curettage Procedure Note: Discussed procedure with patient/patient's guardian including risks and benefits as well as treatment alternatives. Risks of procedure include pain, bleeding, infection, post-inflammatory pigmentary alteration, scar, recurrence. Verbal consent obtained. Curettage and desiccation x 3 performed. Lesion size after primary curettage: 0.6 cm. Petroleum jelly and bandage applied to wound. Patient tolerated procedure well. After-visit wound care instructions reviewed and provided in writing. F/u 3 months, PRN sooner.     NUB - L ear  - Discussed diagnosis with patient and explained uncertain nature of  condition, including differential DDX.   - Discussed treatment options (biopsy, close monitoring) with patient, including the risks and benefits of each. Patient opted to pursue biopsy.  - Shave Biopsy Procedure Note: Discussed procedure with patient/patient's guardian including risks and benefits as well as treatment alternatives. Risks of procedure include pain, bleeding, infection, post-inflammatory pigmentary alteration, scar, recurrence. Patient informed that the purpose of a biopsy is sampling of condition in question rather than removal in entirety; further treatment may be necessary. Verbal consent obtained. Area to be biopsied marked and cleansed with alcohol. Local anesthesia achieved by injecting approximately 1 cc of 1% lidocaine with epinephrine. One shave biopsy performed using a double edge razor blade; specimen submitted to pathology. Hemostasis achieved with aluminum chloride. Petroleum jelly and bandage applied to wound. Patient tolerated procedure well. After-visit wound care instructions reviewed and provided in writing.     AK (actinic keratosis)  - Discussed diagnosis, etiology, and precancerous nature of condition.   - Cryosurgery Procedure Note: Discussed procedure with patient/patient's guardian including risks and benefits as well as treatment alternatives. Risks of procedure include pain, itching, swelling, redness, blistering, crusting, wound formation, post-inflammatory pigmentary alteration, scar, recurrence. Verbal consent obtained. LN2 cryosurgery performed to 8 lesion(s). Patient tolerated procedure well. After-visit wound care instructions reviewed and provided in writing.      Multiple benign nevi  - Discussed diagnosis, etiology, and benign-nature of condition.  - Reassured; no lesions suspicious for malignancy noted on exam today.   - Recommended routine self examination of skin. Discussed the ABCDEs of melanoma and ugly duckling sign.   - Recommended daily sun protection,  including the use of OTC broad-spectrum sunscreen (SPF 30 or greater) and sun-protective clothing.      Lentigines  - Benign; reassured treatment not necessary.   - Recommended daily sun protection, including the use of OTC broad-spectrum sunscreen (SPF 30 or greater) and sun-protective clothing.       SK (seborrheic keratosis)  Cherry angioma  Sebaceous hyperplasia  - Benign; reassured treatment not necessary.      History of nonmelanoma skin cancer  Immunocompromised state  Screening for skin cancer  - Upper body skin examination performed today.  - Findings listed above.   - Discussed increased risk of skin cancer with immunosuppression.   - Recommended routine self examination of skin.    - Recommended daily sun protection, including the use of OTC broad-spectrum sunscreen (SPF 30 or greater) and sun-protective clothing.                  Follow up for pending pathology.

## 2023-03-02 NOTE — PATIENT INSTRUCTIONS

## 2023-03-08 LAB
FINAL PATHOLOGIC DIAGNOSIS: NORMAL
GROSS: NORMAL
Lab: NORMAL
MICROSCOPIC EXAM: NORMAL

## 2023-03-13 NOTE — PROGRESS NOTES
1. Skin, left ear, shave biopsy:   - SQUAMOUS CELL CARCINOMA IN SITU/ BOWEN'S DISEASE.   - THE TUMOR EXTENDS TO A PERIPHERAL BIOPSY MARGIN.     Discussed treatment options with patient over the phone. Plan to refer for external Mohs with [facility]. Mohs AUC level 7 (SCC is, area H, recurrent - failed Efudex course), also immunocompromised status. Please initiate external referral and also schedule f/u skin check with me in 3 months. Thanks!

## 2023-04-05 ENCOUNTER — PES CALL (OUTPATIENT)
Dept: ADMINISTRATIVE | Facility: CLINIC | Age: 68
End: 2023-04-05
Payer: COMMERCIAL

## 2023-06-07 ENCOUNTER — OFFICE VISIT (OUTPATIENT)
Dept: DERMATOLOGY | Facility: CLINIC | Age: 68
End: 2023-06-07
Payer: COMMERCIAL

## 2023-06-07 DIAGNOSIS — D18.01 CHERRY ANGIOMA: ICD-10-CM

## 2023-06-07 DIAGNOSIS — L82.1 SK (SEBORRHEIC KERATOSIS): ICD-10-CM

## 2023-06-07 DIAGNOSIS — L81.4 LENTIGINES: ICD-10-CM

## 2023-06-07 DIAGNOSIS — D22.9 FIBROUS PAPULE OF SKIN: ICD-10-CM

## 2023-06-07 DIAGNOSIS — D84.9 IMMUNOCOMPROMISED STATE: ICD-10-CM

## 2023-06-07 DIAGNOSIS — L57.0 AK (ACTINIC KERATOSIS): ICD-10-CM

## 2023-06-07 DIAGNOSIS — Z12.83 SCREENING FOR SKIN CANCER: ICD-10-CM

## 2023-06-07 DIAGNOSIS — C44.229 SCC (SQUAMOUS CELL CARCINOMA), EAR, LEFT: Primary | ICD-10-CM

## 2023-06-07 DIAGNOSIS — L73.8 SEBACEOUS HYPERPLASIA: ICD-10-CM

## 2023-06-07 DIAGNOSIS — Z85.828 HISTORY OF NONMELANOMA SKIN CANCER: ICD-10-CM

## 2023-06-07 DIAGNOSIS — D22.9 MULTIPLE BENIGN NEVI: ICD-10-CM

## 2023-06-07 PROCEDURE — 1101F PR PT FALLS ASSESS DOC 0-1 FALLS W/OUT INJ PAST YR: ICD-10-PCS | Mod: CPTII,S$GLB,, | Performed by: DERMATOLOGY

## 2023-06-07 PROCEDURE — 1101F PT FALLS ASSESS-DOCD LE1/YR: CPT | Mod: CPTII,S$GLB,, | Performed by: DERMATOLOGY

## 2023-06-07 PROCEDURE — 1159F PR MEDICATION LIST DOCUMENTED IN MEDICAL RECORD: ICD-10-PCS | Mod: CPTII,S$GLB,, | Performed by: DERMATOLOGY

## 2023-06-07 PROCEDURE — 17004 DESTROY PREMAL LESIONS 15/>: CPT | Mod: S$GLB,,, | Performed by: DERMATOLOGY

## 2023-06-07 PROCEDURE — 17004 PR DESTRUCTION, PREMALIGNANT LESIONS; 15 OR MORE LESIONS: ICD-10-PCS | Mod: S$GLB,,, | Performed by: DERMATOLOGY

## 2023-06-07 PROCEDURE — 99999 PR PBB SHADOW E&M-EST. PATIENT-LVL III: ICD-10-PCS | Mod: PBBFAC,,, | Performed by: DERMATOLOGY

## 2023-06-07 PROCEDURE — 99213 PR OFFICE/OUTPT VISIT, EST, LEVL III, 20-29 MIN: ICD-10-PCS | Mod: 25,S$GLB,, | Performed by: DERMATOLOGY

## 2023-06-07 PROCEDURE — 3288F PR FALLS RISK ASSESSMENT DOCUMENTED: ICD-10-PCS | Mod: CPTII,S$GLB,, | Performed by: DERMATOLOGY

## 2023-06-07 PROCEDURE — 99999 PR PBB SHADOW E&M-EST. PATIENT-LVL III: CPT | Mod: PBBFAC,,, | Performed by: DERMATOLOGY

## 2023-06-07 PROCEDURE — 1159F MED LIST DOCD IN RCRD: CPT | Mod: CPTII,S$GLB,, | Performed by: DERMATOLOGY

## 2023-06-07 PROCEDURE — 3288F FALL RISK ASSESSMENT DOCD: CPT | Mod: CPTII,S$GLB,, | Performed by: DERMATOLOGY

## 2023-06-07 PROCEDURE — 99213 OFFICE O/P EST LOW 20 MIN: CPT | Mod: 25,S$GLB,, | Performed by: DERMATOLOGY

## 2023-06-07 NOTE — PATIENT INSTRUCTIONS
CRYOSURGERY      Your doctor has used a method called cryosurgery to treat your skin condition. Cryosurgery refers to the use of very cold substances to treat a variety of skin conditions such as warts, pre-skin cancers, molluscum contagiosum, sun spots, and several benign growths. The substance we use in cryosurgery is liquid nitrogen and is so cold (-195 degrees Celsius) that is burns when administered.     Following treatment in the office, the skin may immediately burn and become red. You may find the area around the lesion is affected as well. It is sometimes necessary to treat not only the lesion, but a small area of the surrounding normal skin to achieve a good response.     A blister, and even a blood filled blister, may form after treatment.   This is a normal response. If the blister is painful, it is acceptable to sterilize a needle and with rubbing alcohol and gently pop the blister. It is important that you gently wash the area with soap and warm water as the blister fluid may contain wart virus if a wart was treated. Do no remove the roof of the blister.     The area treated can take anywhere from 1-3 weeks to heal. Healing time depends on the kind skin lesion treated, the location, and how aggressively the lesion was treated. It is recommended that the areas treated are covered with Vaseline or bacitracin ointment and a band-aid. If a band-aid is not practical, just ointment applied several times per day will do. Keeping these areas moist will speed the healing time.    Treatment with liquid nitrogen can leave a scar. In dark skin, it may be a light or dark scar, in light skin it may be a white or pink scar. These will generally fade with time, but may never go away completely.     If you have any concerns after your treatment, please feel free to call the office.       [address] [place] [place] [address]/ [phone] FAX/ [email or web address] What Are the Symptoms of Skin  Cancer?  A change in your skin is the most common sign of skin cancer. This could be a new growth, a sore that doesnt heal, or a change in a mole. Not all skin cancers look the same.    For melanoma specifically, a simple way to remember the warning signs is to remember the A-B-C-D-Es of melanoma--    A stands for asymmetrical. Does the mole or spot have an irregular shape with two parts that look very different?  B stands for border. Is the border irregular or jagged?  C is for color. Is the color uneven?  D is for diameter. Is the mole or spot larger than the size of a pea?  E is for evolving. Has the mole or spot changed during the past few weeks or months?    Talk to your doctor if you notice changes in your skin such as a new growth, a sore that doesnt heal, a change in an old growth, or any of the A-B-C-D-Es of melanoma    What Can I Do to Reduce My Risk of Skin Cancer?  Protection from ultraviolet (UV) radiation is important all year, not just during the summer or at the beach. UV rays from the sun can reach you on cloudy and hazy days, not just on bright and waldo days. UV rays also reflect off of surfaces like water, cement, sand, and snow. Indoor tanning (using a tanning bed, randolph, or sunlamp to get tan) exposes users to UV radiation.    The hours between 10 a.m. and 4 p.m. Daylight Saving Time (9 a.m. to 3 p.m. standard time) are the most hazardous for UV exposure outdoors in the continental [place]. UV rays from sunlight are the greatest during the late spring and early summer in [place].    CDC recommends easy options for protection from UV radiation--    Stay in the shade or indoors, especially during midday hours.  Wear clothing that covers your arms and legs.  Wear a hat with a wide brim to shade your face, head, ears, and neck.  Wear sunglasses that wrap around and block both UVA and UVB rays.  Use sunscreen with a sun protection factor (SPF) of 30 or higher, and both UVA  and UVB (broad spectrum) protection.  Avoid indoor tanning.    Adapted from https://www.cdc.gov/cancer/skin/basic_info/

## 2023-06-07 NOTE — PROGRESS NOTES
"  Subjective:       Patient ID:  Raymundo Restrepo is a 68 y.o. male who presents for   No chief complaint on file.    HPI  Established patient.  Here today for upper body skin exam.    Immunosuppressed: hx kidney transplant 2010, maintained on Prograf and Cellcept.  Hx of NMSC as below.     Derm Hx:    SCC is at L ear s/p Efudex 4 week course in 11-12/2022; recurrent on bx 3/2023, pending Mohs with [facility] TOMORROW 6/8/23   SCC, moderately differentiated, at L upper lip s/p Mohs 12/2022   BCC at L preauricular s/p Mohs 12/2022  Persistent "crateriform squamoproliferative lesion" at R preauricular [superior] s/p WLE with ENT 12/2022 (neg margins); prior LN2, Efudex failure  SCC is (focal) arising within HAK s/p EDC and subsequent Efudex course 8/2022  SCC is at L dorsal hand s/p EDC 8/2023  SCC is at R preauricular cheek (inferior) s/p Mohs   SCC is at L neck  SCC is at L posterior neck  SCC is at L dorsal wrist s/p EDC    Review of Systems   Skin:  Positive for activity-related sunscreen use. Negative for daily sunscreen use and wears hat.   Hematologic/Lymphatic: Bruises/bleeds easily.      Objective:    Physical Exam   Constitutional: He appears well-developed and well-nourished.   Neurological: He is alert and oriented to person, place, and time.   Psychiatric: He has a normal mood and affect.   Skin:   Areas Examined (abnormalities noted in diagram):   Scalp / Hair Palpated and Inspected  Head / Face Inspection Performed  Neck Inspection Performed  Chest / Axilla Inspection Performed  Abdomen Inspection Performed  Back Inspection Performed  RUE Inspected  LUE Inspection Performed                     Diagram Legend     Erythematous scaling macule/papule c/w actinic keratosis       Vascular papule c/w angioma      Pigmented verrucoid papule/plaque c/w seborrheic keratosis      Yellow umbilicated papule c/w sebaceous hyperplasia      Irregularly shaped tan macule c/w lentigo     1-2 mm smooth white papules consistent with " Milia      Movable subcutaneous cyst with punctum c/w epidermal inclusion cyst      Subcutaneous movable cyst c/w pilar cyst      Firm pink to brown papule c/w dermatofibroma      Pedunculated fleshy papule(s) c/w skin tag(s)      Evenly pigmented macule c/w junctional nevus     Mildly variegated pigmented, slightly irregular-bordered macule c/w mildly atypical nevus      Flesh colored to evenly pigmented papule c/w intradermal nevus       Pink pearly papule/plaque c/w basal cell carcinoma      Erythematous hyperkeratotic cursted plaque c/w SCC      Surgical scar with no sign of skin cancer recurrence      Open and closed comedones      Inflammatory papules and pustules      Verrucoid papule consistent consistent with wart     Erythematous eczematous patches and plaques     Dystrophic onycholytic nail with subungual debris c/w onychomycosis     Umbilicated papule    Erythematous-base heme-crusted tan verrucoid plaque consistent with inflamed seborrheic keratosis     Erythematous Silvery Scaling Plaque c/w Psoriasis     See annotation    Assessment / Plan:            SCC (squamous cell carcinoma), ear, left  -     Ambulatory referral/consult to Dermatology  Recurrent SCCIS on biopsy performed in 3/2023 following Efudex course.   Pending Mohs consult tomorrow with [facility].     AK (actinic keratosis)  - Discussed diagnosis, etiology, and precancerous nature of condition.   - Cryosurgery Procedure Note: Discussed procedure with patient/patient's guardian including risks and benefits as well as treatment alternatives. Risks of procedure include pain, itching, swelling, redness, blistering, crusting, wound formation, post-inflammatory pigmentary alteration, scar, recurrence. Verbal consent obtained. LN2 cryosurgery performed to 15 lesion(s). Patient tolerated procedure well. After-visit wound care instructions reviewed and provided in writing.    Plan for field therapy treatment this fall.    Multiple benign nevi  - Discussed  diagnosis, etiology, and benign-nature of condition.  - Reassured; no lesions suspicious for malignancy noted on exam today.   - Recommended routine self examination of skin. Discussed the ABCDEs of melanoma and ugly duckling sign.   - Recommended daily sun protection, including the use of OTC broad-spectrum sunscreen (SPF 30 or greater) and sun-protective clothing.      Lentigines  - Benign; reassured treatment not necessary.   - Recommended daily sun protection, including the use of OTC broad-spectrum sunscreen (SPF 30 or greater) and sun-protective clothing.       SK (seborrheic keratosis)  Cherry angioma  Sebaceous hyperplasia  - Benign; reassured treatment not necessary.      History of nonmelanoma skin cancer  Immunocompromised state  Screening for skin cancer  - Upper body skin examination performed today.  - Findings listed above.   - Sites of prior malignancy examined - no concern for recurrence today.    - Discussed increased risk of skin cancer with immunosuppression.   - Recommended routine self examination of skin.    - Recommended daily sun protection, including the use of OTC broad-spectrum sunscreen (SPF 30 or greater) and sun-protective clothing.               Follow up in about 3 months (around 9/7/2023) for skin check.

## 2023-06-09 ENCOUNTER — TELEPHONE (OUTPATIENT)
Dept: FAMILY MEDICINE | Facility: CLINIC | Age: 68
End: 2023-06-09
Payer: COMMERCIAL

## 2023-06-09 ENCOUNTER — PATIENT OUTREACH (OUTPATIENT)
Dept: ADMINISTRATIVE | Facility: HOSPITAL | Age: 68
End: 2023-06-09
Payer: COMMERCIAL

## 2023-06-09 VITALS — SYSTOLIC BLOOD PRESSURE: 134 MMHG | DIASTOLIC BLOOD PRESSURE: 70 MMHG

## 2023-06-09 NOTE — PROGRESS NOTES
Working HTN report:     Called to discuss scheduling appointment and to get updated BP reading.  Remote reading 134/70. Remote BP entered.

## 2023-09-25 ENCOUNTER — PATIENT MESSAGE (OUTPATIENT)
Dept: ADMINISTRATIVE | Facility: HOSPITAL | Age: 68
End: 2023-09-25
Payer: COMMERCIAL

## 2023-10-27 DIAGNOSIS — L57.0 AK (ACTINIC KERATOSIS): ICD-10-CM

## 2023-10-27 DIAGNOSIS — D04.4 SQUAMOUS CELL CARCINOMA IN SITU (SCCIS) OF SCALP: ICD-10-CM

## 2023-10-29 RX ORDER — FLUOROURACIL 50 MG/G
CREAM TOPICAL
Qty: 40 G | Refills: 1 | Status: SHIPPED | OUTPATIENT
Start: 2023-10-29

## 2023-12-05 ENCOUNTER — TELEPHONE (OUTPATIENT)
Dept: TRANSPLANT | Facility: CLINIC | Age: 68
End: 2023-12-05
Payer: COMMERCIAL

## 2023-12-05 NOTE — TELEPHONE ENCOUNTER
Contacted by Dr. Crump that Raymundo has developed proteinuria, and they have been trying to reach us unsuccessfully.  -Please reopen transplant episode under Dr. Sanchez to evaluate.

## 2023-12-06 ENCOUNTER — TELEPHONE (OUTPATIENT)
Dept: TRANSPLANT | Facility: CLINIC | Age: 68
End: 2023-12-06
Payer: COMMERCIAL

## 2023-12-06 NOTE — TELEPHONE ENCOUNTER
Phone call to patient after receiving message from Dr BRANDON Mora.  (Contacted by Dr. Crump that Raymundo has developed proteinuria, and they have been trying to reach us unsuccessfully.  -Please reopen transplant episode under Dr. Sanchez to evaluate.)  Spoke with patient and will have labs drawn on 12/11/2023 and will be seen in clinic on 12/18/2023 at 1:30pm.

## 2023-12-11 ENCOUNTER — PATIENT MESSAGE (OUTPATIENT)
Dept: TRANSPLANT | Facility: CLINIC | Age: 68
End: 2023-12-11
Payer: MEDICARE

## 2023-12-11 ENCOUNTER — TELEPHONE (OUTPATIENT)
Dept: TRANSPLANT | Facility: CLINIC | Age: 68
End: 2023-12-11
Payer: MEDICARE

## 2023-12-11 ENCOUNTER — DOCUMENTATION ONLY (OUTPATIENT)
Dept: TRANSPLANT | Facility: CLINIC | Age: 68
End: 2023-12-11
Payer: MEDICARE

## 2023-12-11 LAB
EXT ALBUMIN: 3.8
EXT ALKALINE PHOSPHATASE: 71
EXT ALT: 15
EXT AST: 17
EXT BACTERIA UA: 0
EXT BILIRUBIN DIRECT: 0.2 MG/DL
EXT BILIRUBIN TOTAL: 0.4
EXT BUN: 15
EXT CALCIUM: 9
EXT CHLORIDE: 111
EXT CO2: 23
EXT CREATININE UA: 122.9
EXT CREATININE: 1.44 MG/DL
EXT EOSINOPHIL%: 2.2
EXT GFR MDRD NON AF AMER: 53
EXT GLUCOSE UA: ABNORMAL
EXT GLUCOSE: 127
EXT HEMATOCRIT: 37.5
EXT HEMOGLOBIN: 11.7
EXT LYMPH%: 35.8
EXT MAGNESIUM: 1.9
EXT MONOCYTES%: 6
EXT NITRITES UA: ABNORMAL
EXT PHOSPHORUS: 2.7
EXT PLATELETS: 186
EXT POTASSIUM: 4.5
EXT PROT/CREAT RATIO UR: 1.41
EXT PROTEIN TOTAL: 6.9
EXT PROTEIN UA: 100
EXT PTH, INTACT: 51.8
EXT RBC UA: 0
EXT SEGS%: 65.4
EXT SODIUM: 138 MMOL/L
EXT TACROLIMUS LVL: 2.9
EXT URINE PROTEIN: 173.8
EXT VIT D 25 HYDROXY: 39.5
EXT WBC UA: ABNORMAL
EXT WBC: 5.4

## 2023-12-12 ENCOUNTER — PATIENT MESSAGE (OUTPATIENT)
Dept: TRANSPLANT | Facility: CLINIC | Age: 68
End: 2023-12-12
Payer: MEDICARE

## 2023-12-12 DIAGNOSIS — Z94.0 KIDNEY REPLACED BY TRANSPLANT: ICD-10-CM

## 2023-12-12 DIAGNOSIS — Z94.0 KIDNEY REPLACED BY TRANSPLANT: Primary | ICD-10-CM

## 2023-12-12 RX ORDER — TACROLIMUS 0.5 MG/1
CAPSULE ORAL
Qty: 150 CAPSULE | Refills: 11 | Status: SHIPPED | OUTPATIENT
Start: 2023-12-12 | End: 2023-12-18

## 2023-12-12 RX ORDER — TACROLIMUS 0.5 MG/1
CAPSULE ORAL
Qty: 150 CAPSULE | Refills: 11 | Status: SHIPPED | OUTPATIENT
Start: 2023-12-12 | End: 2023-12-12 | Stop reason: SDUPTHER

## 2023-12-12 NOTE — TELEPHONE ENCOUNTER
As previously agreed , Message sent to patient and to call coordinator back with any questions. Next FK level on 12/18/2023.  ----- Message from Cinthia Sanchez DO sent at 12/12/2023 12:09 PM CST -----  Near baseline graft function. Will discuss cause of proteinuria when I see him in clinic  Low FK. Increase FK to 1.5/1. Recheck a level next week

## 2023-12-12 NOTE — TELEPHONE ENCOUNTER
Return call to patient and requesting Tacrolimus RX sent to his local pharmacy.  Patient will repeat Tacrolimus level here on Monday 12/18.  ----- Message from Shannon Adams sent at 12/12/2023  3:26 PM CST -----  Regarding: Patient advice  Contact: Pt  [phone]            Caller: Raymundo     Returning call to: Du     Caller can be reached at: [phone]    Nature of the call: Returning missed call

## 2023-12-18 ENCOUNTER — OFFICE VISIT (OUTPATIENT)
Dept: TRANSPLANT | Facility: CLINIC | Age: 68
End: 2023-12-18
Payer: MEDICARE

## 2023-12-18 ENCOUNTER — LAB VISIT (OUTPATIENT)
Dept: LAB | Facility: HOSPITAL | Age: 68
End: 2023-12-18
Attending: INTERNAL MEDICINE
Payer: MEDICARE

## 2023-12-18 VITALS
BODY MASS INDEX: 28.37 KG/M2 | SYSTOLIC BLOOD PRESSURE: 120 MMHG | OXYGEN SATURATION: 95 % | HEART RATE: 91 BPM | TEMPERATURE: 97 F | WEIGHT: 198.19 LBS | HEIGHT: 70 IN | DIASTOLIC BLOOD PRESSURE: 66 MMHG | RESPIRATION RATE: 18 BRPM

## 2023-12-18 DIAGNOSIS — Z94.0 KIDNEY REPLACED BY TRANSPLANT: ICD-10-CM

## 2023-12-18 DIAGNOSIS — R80.1 PERSISTENT PROTEINURIA: Primary | ICD-10-CM

## 2023-12-18 DIAGNOSIS — Z94.0 DECEASED-DONOR KIDNEY TRANSPLANT: Chronic | ICD-10-CM

## 2023-12-18 DIAGNOSIS — Z29.89 NEED FOR PROPHYLACTIC IMMUNOTHERAPY: Chronic | ICD-10-CM

## 2023-12-18 LAB — TACROLIMUS BLD-MCNC: 2.8 NG/ML (ref 5–15)

## 2023-12-18 PROCEDURE — 3078F PR MOST RECENT DIASTOLIC BLOOD PRESSURE < 80 MM HG: ICD-10-PCS | Mod: CPTII,S$GLB,, | Performed by: INTERNAL MEDICINE

## 2023-12-18 PROCEDURE — 99215 OFFICE O/P EST HI 40 MIN: CPT | Mod: S$GLB,,, | Performed by: INTERNAL MEDICINE

## 2023-12-18 PROCEDURE — 99215 PR OFFICE/OUTPT VISIT, EST, LEVL V, 40-54 MIN: ICD-10-PCS | Mod: S$GLB,,, | Performed by: INTERNAL MEDICINE

## 2023-12-18 PROCEDURE — 3008F BODY MASS INDEX DOCD: CPT | Mod: CPTII,S$GLB,, | Performed by: INTERNAL MEDICINE

## 2023-12-18 PROCEDURE — 99999 PR PBB SHADOW E&M-EST. PATIENT-LVL IV: ICD-10-PCS | Mod: PBBFAC,,, | Performed by: INTERNAL MEDICINE

## 2023-12-18 PROCEDURE — 3288F FALL RISK ASSESSMENT DOCD: CPT | Mod: CPTII,S$GLB,, | Performed by: INTERNAL MEDICINE

## 2023-12-18 PROCEDURE — 80197 ASSAY OF TACROLIMUS: CPT | Performed by: INTERNAL MEDICINE

## 2023-12-18 PROCEDURE — 1101F PT FALLS ASSESS-DOCD LE1/YR: CPT | Mod: CPTII,S$GLB,, | Performed by: INTERNAL MEDICINE

## 2023-12-18 PROCEDURE — 4010F ACE/ARB THERAPY RXD/TAKEN: CPT | Mod: CPTII,S$GLB,, | Performed by: INTERNAL MEDICINE

## 2023-12-18 PROCEDURE — 99999 PR PBB SHADOW E&M-EST. PATIENT-LVL IV: CPT | Mod: PBBFAC,,, | Performed by: INTERNAL MEDICINE

## 2023-12-18 PROCEDURE — 1101F PR PT FALLS ASSESS DOC 0-1 FALLS W/OUT INJ PAST YR: ICD-10-PCS | Mod: CPTII,S$GLB,, | Performed by: INTERNAL MEDICINE

## 2023-12-18 PROCEDURE — 3008F PR BODY MASS INDEX (BMI) DOCUMENTED: ICD-10-PCS | Mod: CPTII,S$GLB,, | Performed by: INTERNAL MEDICINE

## 2023-12-18 PROCEDURE — 36415 COLL VENOUS BLD VENIPUNCTURE: CPT | Performed by: INTERNAL MEDICINE

## 2023-12-18 PROCEDURE — 1159F MED LIST DOCD IN RCRD: CPT | Mod: CPTII,S$GLB,, | Performed by: INTERNAL MEDICINE

## 2023-12-18 PROCEDURE — 1126F AMNT PAIN NOTED NONE PRSNT: CPT | Mod: CPTII,S$GLB,, | Performed by: INTERNAL MEDICINE

## 2023-12-18 PROCEDURE — 3078F DIAST BP <80 MM HG: CPT | Mod: CPTII,S$GLB,, | Performed by: INTERNAL MEDICINE

## 2023-12-18 PROCEDURE — 3074F PR MOST RECENT SYSTOLIC BLOOD PRESSURE < 130 MM HG: ICD-10-PCS | Mod: CPTII,S$GLB,, | Performed by: INTERNAL MEDICINE

## 2023-12-18 PROCEDURE — 3066F PR DOCUMENTATION OF TREATMENT FOR NEPHROPATHY: ICD-10-PCS | Mod: CPTII,S$GLB,, | Performed by: INTERNAL MEDICINE

## 2023-12-18 PROCEDURE — 4010F PR ACE/ARB THEARPY RXD/TAKEN: ICD-10-PCS | Mod: CPTII,S$GLB,, | Performed by: INTERNAL MEDICINE

## 2023-12-18 PROCEDURE — 3074F SYST BP LT 130 MM HG: CPT | Mod: CPTII,S$GLB,, | Performed by: INTERNAL MEDICINE

## 2023-12-18 PROCEDURE — 1126F PR PAIN SEVERITY QUANTIFIED, NO PAIN PRESENT: ICD-10-PCS | Mod: CPTII,S$GLB,, | Performed by: INTERNAL MEDICINE

## 2023-12-18 PROCEDURE — 3066F NEPHROPATHY DOC TX: CPT | Mod: CPTII,S$GLB,, | Performed by: INTERNAL MEDICINE

## 2023-12-18 PROCEDURE — 3288F PR FALLS RISK ASSESSMENT DOCUMENTED: ICD-10-PCS | Mod: CPTII,S$GLB,, | Performed by: INTERNAL MEDICINE

## 2023-12-18 PROCEDURE — 1159F PR MEDICATION LIST DOCUMENTED IN MEDICAL RECORD: ICD-10-PCS | Mod: CPTII,S$GLB,, | Performed by: INTERNAL MEDICINE

## 2023-12-18 RX ORDER — MYCOPHENOLATE MOFETIL 250 MG/1
500 CAPSULE ORAL 2 TIMES DAILY
Qty: 120 CAPSULE | Refills: 11
Start: 2023-12-18 | End: 2024-12-17

## 2023-12-18 RX ORDER — LOSARTAN POTASSIUM 25 MG/1
25 TABLET ORAL
COMMUNITY

## 2023-12-18 RX ORDER — TACROLIMUS 0.5 MG/1
CAPSULE ORAL
Qty: 240 CAPSULE | Refills: 11 | Status: SHIPPED | OUTPATIENT
Start: 2023-12-18 | End: 2024-01-04 | Stop reason: DRUGHIGH

## 2023-12-18 NOTE — LETTER
December 21, 2023        Jean Crump  [address] [place] [address]  [place] [address]  Phone: [phone]  Fax: [phone]             [facility]  [address]  [facility] [address]  Phone: [phone]   Patient: Raymundo Restrepo   MR Number: [id number]   YOB: 1955   Date of Visit: 12/18/2023       Dear Dr. Jean Crump    Thank you for referring Raymundo Restrepo to me for evaluation. Attached you will find relevant portions of my assessment and plan of care.    If you have questions, please do not hesitate to call me. I look forward to following Raymundo Restrepo along with you.    Sincerely,    Cinthia Sanchez, DO    Enclosure    If you would like to receive this communication electronically, please contact [email or web address] or [phone] to request [employer] access.    [employer] is a tool which provides read-only access to select patient information with whom you have a relationship. Its easy to use and provides real time access to review your patients record including encounter summaries, notes, results, and demographic information.    If you feel you have received this communication in error or would no longer like to receive these types of communications, please e-mail [email or web address]

## 2023-12-21 NOTE — PROGRESS NOTES
Kidney Post-Transplant Assessment    Referring Physician: Chad Mayer  Current Nephrologist: Jean Crump    ORGAN: RIGHT KIDNEY  Donor Type: donation after brain death  PHS Increased Risk: no  Cold Ischemia: 814.2 mins  Induction Medications: steroids (prednisone,methylprednisolone,solumedrol,medrol,decadron), campath - alemtuzumab (anti-cd52)    Subjective:     CC:  Reassessment of renal allograft function and management of chronic immunosuppression.    HPI:  Mr. Restrepo is a 68 y.o. year old White male who received a donation after brain death kidney transplant on 8/28/10 who was referred back by his nephrologist for evaluation of proteinuria in a transplant recipient.    Patient is a 68 yr old  male with hx of PKD s/p  donor kidney transplant on 2010. Uncomplicated course. Did not require any native nephrectomies. Campath induction. No prior hx of rejection or even a prior kidney biopsy. Patient also with a remote hx of Hodgkin's lymphoma, history of CAD s/p PCI (last one on 2010; on plavix) as well as multiple and aggressive SCC with last lip resection done last year. Of note, patient also with hx of C diff infection s/p fecal transplant x 2.     Patient states that he is overall in good health. States that he was first informed about the proteinuria roughly 8 months ago by Dr. Blackwell and that it is steadily getting worse. Continues to have near baseline graft function of 1.4 however. Patient denies any swelling of his legs, any SOB, PARSONS or abdominal swelling. Continues to take prograf 1.5 in the morning and 1 mg at night as well as Cellcept 500 mg BID. SBP at home in 150's generally with Cozaar 25 mg daily recently started. Of note, patient states that he has noticed dizziness as soon as he wakes up which continues for roughly 30 min in the morning. Has not checked his blood pressure while lying or in the middle of the dizziness spell.      Review of Systems  "  Constitutional:  Negative for activity change, appetite change, chills and fever.   HENT:  Negative for congestion, sneezing and sore throat.    Eyes:  Negative for pain and itching.   Respiratory:  Negative for apnea, cough, shortness of breath and wheezing.    Cardiovascular:  Negative for chest pain.   Gastrointestinal:  Negative for abdominal pain, constipation, diarrhea, nausea and vomiting.   Genitourinary:  Negative for difficulty urinating, dysuria and frequency.   Musculoskeletal:  Negative for back pain, joint swelling and neck pain.   Skin:  Negative for color change.   Neurological:  Positive for dizziness and light-headedness. Negative for headaches.   Psychiatric/Behavioral:  Negative for behavioral problems and confusion. The patient is not nervous/anxious.        Objective:   Blood pressure 120/66, pulse 91, temperature 97.2 °F (36.2 °C), temperature source Skin, resp. rate 18, height 5' 10" (1.778 m), weight 89.9 kg (198 lb 3.1 oz), SpO2 95 %.body mass index is 28.44 kg/m².    Physical Exam  Vitals reviewed.   Constitutional:       General: He is not in acute distress.     Appearance: Normal appearance. He is not ill-appearing or toxic-appearing.      Comments: Appears stated age   HENT:      Head: Normocephalic and atraumatic.      Right Ear: External ear normal.      Left Ear: External ear normal.      Nose: Nose normal.   Eyes:      General: No scleral icterus.     Conjunctiva/sclera: Conjunctivae normal.   Cardiovascular:      Rate and Rhythm: Normal rate and regular rhythm.      Pulses: Normal pulses.      Heart sounds: Normal heart sounds. No murmur heard.     No friction rub.   Pulmonary:      Effort: Pulmonary effort is normal. No respiratory distress.      Breath sounds: Normal breath sounds. No wheezing or rhonchi.   Abdominal:      General: Bowel sounds are normal. There is no distension.      Palpations: Abdomen is soft.      Tenderness: There is no abdominal tenderness. There is no " guarding.   Musculoskeletal:         General: No swelling or deformity. Normal range of motion.      Cervical back: Normal range of motion and neck supple. No tenderness.      Right lower leg: No edema.      Left lower leg: No edema.   Skin:     General: Skin is warm and dry.      Coloration: Skin is not jaundiced.      Findings: No erythema or rash.   Neurological:      General: No focal deficit present.      Mental Status: He is alert and oriented to person, place, and time.      Motor: No weakness.   Psychiatric:         Mood and Affect: Mood normal.         Behavior: Behavior normal.         Labs:  Lab Results   Component Value Date    WBC 4.41 07/22/2021    HGB 10.2 (L) 07/22/2021    HCT 32.5 (L) 07/22/2021     07/22/2021    K 4.3 07/22/2021     07/22/2021    CO2 15 (L) 07/22/2021    BUN 24 (H) 07/22/2021    CREATININE 1.6 (H) 07/22/2021    CALCIUM 9.1 07/22/2021    PHOS 2.6 (L) 12/29/2020    MG 1.8 03/09/2021    ALBUMIN 3.9 07/22/2021    AST 23 07/22/2021    ALT 17 07/22/2021       Lab Results   Component Value Date    EXTANC  09/08/2014      Comment:      Coming to Clinic on 9/15/14    EXTWBC 5.4 12/11/2023    EXTSEGS 65.4 12/11/2023    EXTPLATELETS 186 12/11/2023    EXTHEMOGLOBI 11.7 (L) 12/11/2023    EXTHEMATOCRI 37.5 (L) 12/11/2023    EXTCREATININ 1.44 (H) 12/11/2023    EXTCREATININ 122.9 12/11/2023    EXTSODIUM 138 12/11/2023    EXTPOTASSIUM 4.5 12/11/2023    EXTBUN 15 12/11/2023    EXTCO2 23 12/11/2023    EXTCALCIUM 9.0 12/11/2023    EXTPHOSPHORU 2.7 12/11/2023    EXTGLUCOSE 127 (H) 12/11/2023    EXTALBUMIN 3.8 12/11/2023    EXTAST 17 12/11/2023    EXTALT 15 12/11/2023    EXTBILITOTAL 0.4 12/11/2023       Lab Results   Component Value Date    EXTTACROLVL 2.9 (L) 12/11/2023    EXTPROTCRE 1.41 12/11/2023    EXTPTHINTACT 51.8 12/11/2023    EXTPROTEINUA 100 (A) 12/11/2023    EXTWBCUA 0-1 12/11/2023    EXTRBCUA 0 12/11/2023       Labs were reviewed with the patient.    Assessment and Plan    68 yr  old  male with ESRD secondary to PKD s/p a  donor kidney transplant on 2010 who is referred for evaluation of proteinuria by his local nephrologist. Campath induction. Patient noted to have roughly 1.5 grams of proteinuria with roughly a baseline creatinine of 1.4. UA otherwise unremarkable.     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 Patient expressed understanding as well as expressed agreement  - discussed with patient how stricter BP control is needed. Will defer to his local nephrologist regarding Bp management  2) s/p  donor kidney transplant:  - near baseline graft function  - low FK. Increase FK to 2 mg BID and recheck FK level in 1 week  - cont on Cellcept 500 mg BID  - check US kidney transplant as well as US native kidneys given patient being on ISN for over 10 years  3) hx of PKD:  - check US of native kidney to rule out a malignancy  4) hx of CAD s/p PCI  - on Plavix  5) hx of Hodgkin's lymphoma:  6) hx of C diff infection s/p fecal transplant x 2  7) hx of SCC   - follows with dermatology      Cinthia Sanchez DO   Transplant Nephrology      NELDA Patient Status  Functional Status: 90% - Able to carry on normal activity: minor symptoms of disease  Physical Capacity: No Limitations    I spent a total of 50 minutes on the day of the visit.This includes face to face time and non-face to face time preparing to see the patient (eg, review of tests), obtaining and/or reviewing separately obtained history, documenting clinical information in the electronic or other health record, independently interpreting results and  communicating results to the patient/family/caregiver, or care coordinator.

## 2023-12-29 ENCOUNTER — HOSPITAL ENCOUNTER (OUTPATIENT)
Dept: RADIOLOGY | Facility: HOSPITAL | Age: 68
Discharge: HOME | End: 2023-12-29
Attending: INTERNAL MEDICINE
Payer: MEDICARE

## 2023-12-29 ENCOUNTER — PATIENT MESSAGE (OUTPATIENT)
Dept: TRANSPLANT | Facility: CLINIC | Age: 68
End: 2023-12-29
Payer: MEDICARE

## 2023-12-29 DIAGNOSIS — Z94.0 KIDNEY REPLACED BY TRANSPLANT: ICD-10-CM

## 2023-12-29 DIAGNOSIS — Z29.89 NEED FOR PROPHYLACTIC IMMUNOTHERAPY: Chronic | ICD-10-CM

## 2023-12-29 DIAGNOSIS — R80.1 PERSISTENT PROTEINURIA: ICD-10-CM

## 2023-12-29 PROCEDURE — 76776 US EXAM K TRANSPL W/DOPPLER: CPT | Mod: TC,PO

## 2023-12-29 PROCEDURE — 76770 US EXAM ABDO BACK WALL COMP: CPT | Mod: TC,PO

## 2024-01-04 DIAGNOSIS — Z94.0 KIDNEY REPLACED BY TRANSPLANT: ICD-10-CM

## 2024-01-04 LAB — EXT TACROLIMUS LVL: 26.9

## 2024-01-04 RX ORDER — TACROLIMUS 0.5 MG/1
CAPSULE ORAL
Qty: 150 CAPSULE | Refills: 11 | Status: SHIPPED | OUTPATIENT
Start: 2024-01-06 | End: 2024-01-12 | Stop reason: DRUGHIGH

## 2024-01-04 NOTE — TELEPHONE ENCOUNTER
Patient repeated back and voice a understanding of orders.  Next Tacrolimus level on 1/11/2024.  ----- Message from Cinthia Sanchez DO sent at 1/4/2024  1:18 PM CST -----  Have him hold FK dose today and all day Friday.  Have him resume FK at 1.5/1 (his previous dose) on Sat morning and recheck a level 7 days from now

## 2024-01-11 ENCOUNTER — DOCUMENTATION ONLY (OUTPATIENT)
Dept: TRANSPLANT | Facility: CLINIC | Age: 69
End: 2024-01-11

## 2024-01-12 ENCOUNTER — DOCUMENTATION ONLY (OUTPATIENT)
Dept: TRANSPLANT | Facility: CLINIC | Age: 69
End: 2024-01-12
Payer: MEDICARE

## 2024-01-12 DIAGNOSIS — Z94.0 KIDNEY REPLACED BY TRANSPLANT: ICD-10-CM

## 2024-01-12 LAB
EXT ALBUMIN: 4.1 (ref 3.7–5.3)
EXT BUN: 35 (ref 6–20)
EXT CALCIUM: 9.5 (ref 8.5–10.5)
EXT CHLORIDE: 114 (ref 101–112)
EXT CO2: 21 (ref 18–32)
EXT CREATININE: 1.69 MG/DL (ref 0.6–1.2)
EXT EGFR NO RACE VARIABLE: 44
EXT EOSINOPHIL%: 1.5 (ref 0–7)
EXT GLUCOSE: 118 (ref 74–106)
EXT HEMATOCRIT: 36.8 (ref 43.5–53.7)
EXT HEMOGLOBIN: 11.6 (ref 4.69–6.13)
EXT LYMPH%: 35.4 (ref 10–50)
EXT MONOCYTES%: 6.3 (ref 0–12)
EXT PHOSPHORUS: 3.3 (ref 2.7–4.5)
EXT PLATELETS: 192 (ref 142–424)
EXT POTASSIUM: 5.4 (ref 3.4–5.1)
EXT SEGS%: 56.4 (ref 37–80)
EXT SODIUM: 140 MMOL/L (ref 135–145)
EXT TACROLIMUS LVL: 11.6
EXT WBC: 6 (ref 4.6–10.2)

## 2024-01-12 NOTE — TELEPHONE ENCOUNTER
Patient repeated back and voice a understanding of orders.  Next labs on 1/19 at [facility] lab  ----- Message from Cinthia Sanchez DO sent at 1/12/2024  3:48 PM CST -----  Decrease FK to 1 mg BID   Recheck FK alone next week

## 2024-01-16 RX ORDER — TACROLIMUS 0.5 MG/1
CAPSULE ORAL
Qty: 120 CAPSULE | Refills: 11 | Status: SHIPPED | OUTPATIENT
Start: 2024-01-16 | End: 2025-01-15

## 2024-01-19 ENCOUNTER — LAB VISIT (OUTPATIENT)
Dept: LAB | Facility: HOSPITAL | Age: 69
End: 2024-01-19
Attending: INTERNAL MEDICINE
Payer: MEDICARE

## 2024-01-19 ENCOUNTER — OFFICE VISIT (OUTPATIENT)
Dept: DERMATOLOGY | Facility: CLINIC | Age: 69
End: 2024-01-19
Payer: MEDICARE

## 2024-01-19 DIAGNOSIS — D22.9 MULTIPLE BENIGN NEVI: ICD-10-CM

## 2024-01-19 DIAGNOSIS — D48.5 NEOPLASM OF UNCERTAIN BEHAVIOR OF SKIN: ICD-10-CM

## 2024-01-19 DIAGNOSIS — L81.4 LENTIGINES: ICD-10-CM

## 2024-01-19 DIAGNOSIS — D84.9 IMMUNOCOMPROMISED STATE: ICD-10-CM

## 2024-01-19 DIAGNOSIS — D18.01 CHERRY ANGIOMA: ICD-10-CM

## 2024-01-19 DIAGNOSIS — L57.0 AK (ACTINIC KERATOSIS): ICD-10-CM

## 2024-01-19 DIAGNOSIS — L82.1 SK (SEBORRHEIC KERATOSIS): ICD-10-CM

## 2024-01-19 DIAGNOSIS — Z85.828 HISTORY OF NONMELANOMA SKIN CANCER: ICD-10-CM

## 2024-01-19 DIAGNOSIS — L73.8 SEBACEOUS HYPERPLASIA: ICD-10-CM

## 2024-01-19 DIAGNOSIS — Z12.83 SCREENING FOR SKIN CANCER: ICD-10-CM

## 2024-01-19 DIAGNOSIS — Z94.0 KIDNEY REPLACED BY TRANSPLANT: ICD-10-CM

## 2024-01-19 DIAGNOSIS — L57.8 ACTINIC SKIN DAMAGE: Primary | ICD-10-CM

## 2024-01-19 PROCEDURE — 88305 TISSUE EXAM BY PATHOLOGIST: CPT | Mod: 59,PO | Performed by: PATHOLOGY

## 2024-01-19 PROCEDURE — 88341 IMHCHEM/IMCYTCHM EA ADD ANTB: CPT | Mod: 26,,, | Performed by: PATHOLOGY

## 2024-01-19 PROCEDURE — 36415 COLL VENOUS BLD VENIPUNCTURE: CPT | Mod: PO | Performed by: INTERNAL MEDICINE

## 2024-01-19 PROCEDURE — 88342 IMHCHEM/IMCYTCHM 1ST ANTB: CPT | Mod: 26,,, | Performed by: PATHOLOGY

## 2024-01-19 PROCEDURE — 11103 TANGNTL BX SKIN EA SEP/ADDL: CPT | Mod: S$GLB,,, | Performed by: DERMATOLOGY

## 2024-01-19 PROCEDURE — 88305 TISSUE EXAM BY PATHOLOGIST: CPT | Mod: 26,,, | Performed by: PATHOLOGY

## 2024-01-19 PROCEDURE — 99999 PR PBB SHADOW E&M-EST. PATIENT-LVL II: CPT | Mod: PBBFAC,,, | Performed by: DERMATOLOGY

## 2024-01-19 PROCEDURE — 17000 DESTRUCT PREMALG LESION: CPT | Mod: XS,S$GLB,, | Performed by: DERMATOLOGY

## 2024-01-19 PROCEDURE — 1101F PT FALLS ASSESS-DOCD LE1/YR: CPT | Mod: CPTII,S$GLB,, | Performed by: DERMATOLOGY

## 2024-01-19 PROCEDURE — 17003 DESTRUCT PREMALG LES 2-14: CPT | Mod: S$GLB,,, | Performed by: DERMATOLOGY

## 2024-01-19 PROCEDURE — 80197 ASSAY OF TACROLIMUS: CPT | Performed by: INTERNAL MEDICINE

## 2024-01-19 PROCEDURE — 4010F ACE/ARB THERAPY RXD/TAKEN: CPT | Mod: CPTII,S$GLB,, | Performed by: DERMATOLOGY

## 2024-01-19 PROCEDURE — 11102 TANGNTL BX SKIN SINGLE LES: CPT | Mod: S$GLB,,, | Performed by: DERMATOLOGY

## 2024-01-19 PROCEDURE — 3288F FALL RISK ASSESSMENT DOCD: CPT | Mod: CPTII,S$GLB,, | Performed by: DERMATOLOGY

## 2024-01-19 PROCEDURE — 88342 IMHCHEM/IMCYTCHM 1ST ANTB: CPT | Mod: 59,PO | Performed by: PATHOLOGY

## 2024-01-19 PROCEDURE — 88341 IMHCHEM/IMCYTCHM EA ADD ANTB: CPT | Mod: 59,PO | Performed by: PATHOLOGY

## 2024-01-19 PROCEDURE — 99213 OFFICE O/P EST LOW 20 MIN: CPT | Mod: 25,S$GLB,, | Performed by: DERMATOLOGY

## 2024-01-19 NOTE — PROGRESS NOTES
"  Subjective:       Patient ID:  Raymundo Restrepo is a 69 y.o. male who presents for   Chief Complaint   Patient presents with    Skin Check     UBSE     HPI  Established patient.  Here today for upper body skin exam.    Immunosuppressed: hx kidney transplant 2010, maintained on Prograf and Cellcept.  Hx of NMSC as below, AK.   Uses Efudex intermittently, confirms response when used.     Derm Hx:    SCC is at L ear s/p Efudex 4 week course in 11-12/2022; recurrent on bx 3/2023 s/p Mohs with SSC 6/2023   SCC, moderately differentiated, at L upper lip s/p Mohs 12/2022   BCC at L preauricular s/p Mohs 12/2022  Persistent "crateriform squamoproliferative lesion" at R preauricular [superior] s/p WLE with ENT 12/2022 (neg margins); prior failure with multiple LN2, Efudex courses  SCCIS (focal) arising within HAK at vertex scalp s/p shave bx 7/2022, EDC and subsequent Efudex course 8/2022  SCCIS at L dorsal hand s/p EDC 8/2023  SCCIS at R preauricular cheek (inferior) s/p Mohs 2018  SCCIS at L neck s/p EDC 2017, Efudex course 2019  SCCIS at L dorsal wrist s/p EDC 2017  SCCIS at R posterior neck s/p shave bx 2017     Review of Systems   Skin:  Positive for activity-related sunscreen use. Negative for daily sunscreen use and wears hat.   Hematologic/Lymphatic: Bruises/bleeds easily.        Objective:    Physical Exam   Constitutional: He appears well-developed and well-nourished.   Neurological: He is alert and oriented to person, place, and time.   Psychiatric: He has a normal mood and affect.   Skin:   Areas Examined (abnormalities noted in diagram):   Scalp / Hair Palpated and Inspected  Head / Face Inspection Performed  Neck Inspection Performed  Chest / Axilla Inspection Performed  Abdomen Inspection Performed  Back Inspection Performed  RUE Inspected  LUE Inspection Performed                       Diagram Legend     Erythematous scaling macule/papule c/w actinic keratosis       Vascular papule c/w angioma      Pigmented " verrucoid papule/plaque c/w seborrheic keratosis      Yellow umbilicated papule c/w sebaceous hyperplasia      Irregularly shaped tan macule c/w lentigo     1-2 mm smooth white papules consistent with Milia      Movable subcutaneous cyst with punctum c/w epidermal inclusion cyst      Subcutaneous movable cyst c/w pilar cyst      Firm pink to brown papule c/w dermatofibroma      Pedunculated fleshy papule(s) c/w skin tag(s)      Evenly pigmented macule c/w junctional nevus     Mildly variegated pigmented, slightly irregular-bordered macule c/w mildly atypical nevus      Flesh colored to evenly pigmented papule c/w intradermal nevus       Pink pearly papule/plaque c/w basal cell carcinoma      Erythematous hyperkeratotic cursted plaque c/w SCC      Surgical scar with no sign of skin cancer recurrence      Open and closed comedones      Inflammatory papules and pustules      Verrucoid papule consistent consistent with wart     Erythematous eczematous patches and plaques     Dystrophic onycholytic nail with subungual debris c/w onychomycosis     Umbilicated papule    Erythematous-base heme-crusted tan verrucoid plaque consistent with inflamed seborrheic keratosis     Erythematous Silvery Scaling Plaque c/w Psoriasis     See annotation                Assessment / Plan:      Pathology Orders:       Normal Orders This Visit    Specimen to Pathology, Dermatology     Comments:    Number of Specimens:->4  ------------------------->-------------------------  Spec 1 Procedure:->Biopsy  Spec 1 Clinical Impression:->sebaceous hyperplasia vs milium  vs idn r/o early malignancy  Spec 1 Source:->right nasofacial junction  ------------------------->-------------------------  Spec 2 Procedure:->Biopsy  Spec 2 Clinical Impression:->r/o scc  Spec 2 Source:->right neck  ------------------------->-------------------------  Spec 3 Procedure:->Biopsy  Spec 3 Clinical Impression:->hak r/o scc  Spec 3 Source:->left hand,  proximal  ------------------------->-------------------------  Spec 4 Procedure:->Biopsy  Spec 4 Clinical Impression:->hak r/o scc  Spec 4 Source:->left hand, distal    Questions:    Procedure Type: Dermatology and skin neoplasms    Number of Specimens: 4    ------------------------: -------------------------    Spec 1 Procedure: Biopsy    Spec 1 Clinical Impression: sebaceous hyperplasia vs milium vs idn r/o early malignancy    Spec 1 Source: right nasofacial junction    ------------------------: -------------------------    Spec 2 Procedure: Biopsy    Spec 2 Clinical Impression: r/o scc    Spec 2 Source: right neck    ------------------------: -------------------------    Spec 3 Procedure: Biopsy    Spec 3 Clinical Impression: hak r/o scc    Spec 3 Source: left hand, proximal    ------------------------: -------------------------    Spec 4 Procedure: Biopsy    Spec 4 Clinical Impression: hak r/o scc    Spec 4 Source: left hand, distal    Release to patient:         NUB  - Discussed diagnosis with patient and explained uncertain nature of condition, including ddx.   - Discussed treatment options (biopsy, close monitoring) with patient, including the risks and benefits of each. Patient opted to pursue biopsy.  - Shave Biopsy Procedure Note: Discussed procedure with patient/patient's guardian including risks and benefits as well as treatment alternatives. Risks of procedure include pain, bleeding, infection, post-inflammatory pigmentary alteration, scar, recurrence. Patient informed that the purpose of a biopsy is sampling of condition in question rather than removal in entirety; further treatment may be necessary. Verbal consent obtained. Area to be biopsied marked and cleansed with alcohol. Local anesthesia achieved by injecting approximately 1 cc of 1% lidocaine with epinephrine. Four shave biopsies performed using a double edge razor blade; specimens submitted to pathology. Hemostasis achieved with aluminum  chloride. Petroleum jelly and bandage applied to wounds. Patient tolerated procedures well. After-visit wound care instructions reviewed and provided in writing.     AK  - Discussed diagnosis, etiology, and precancerous nature of condition.   - Cryosurgery Procedure Note: Discussed procedure with patient/patient's guardian including risks and benefits as well as treatment alternatives. Risks of procedure include pain, itching, swelling, redness, blistering, crusting, wound formation, post-inflammatory pigmentary alteration, scar, recurrence. Verbal consent obtained. LN2 cryosurgery performed to 5 lesion(s). Patient tolerated procedure well. After-visit wound care instructions reviewed and provided in writing.      Actinic skin damage  Plan for Efudex field therapy to dorsal hands pending biopsy results.    Multiple benign nevi  - Discussed diagnosis, etiology, and benign-nature of condition.  - Reassured; no lesions suspicious for malignancy noted on exam today.   - Recommended routine self examination of skin. Discussed the ABCDEs of melanoma and ugly duckling sign.   - Recommended daily sun protection, including the use of OTC broad-spectrum sunscreen (SPF 30 or greater) and sun-protective clothing.      Lentigines  - Benign; reassured treatment not necessary.   - Recommended daily sun protection, including the use of OTC broad-spectrum sunscreen (SPF 30 or greater) and sun-protective clothing.       SK (seborrheic keratosis)  Cherry angioma  Sebaceous hyperplasia  - Benign; reassured treatment not necessary.      History of nonmelanoma skin cancer  Immunocompromised state  Screening for skin cancer  - Upper body skin examination performed today.  - Findings listed above.   - Sites of prior malignancy examined - no concern for recurrence today.    - Discussed increased risk of skin cancer with immunosuppression.   - Recommended routine self examination of skin.    - Recommended daily sun protection, including the  use of OTC broad-spectrum sunscreen (SPF 30 or greater) and sun-protective clothing.           Follow up in about 3 months (around 4/19/2024) for skin check, sooner pending pathology, sooner PRN.

## 2024-01-20 LAB — TACROLIMUS BLD-MCNC: 7.6 NG/ML (ref 5–15)

## 2024-01-20 NOTE — PATIENT INSTRUCTIONS
Shave Biopsy Wound Care    Your doctor has performed a shave biopsy today.  A band aid and vaseline ointment has been placed over the site.  This should remain in place for NO LONGER THAN 48 hours.  It is fine to remove the bandaid after 24 hours, if the area is no longer bleeding. It is recommended that you keep the area dry (do not wet)) for the first 24 hours.  After 24 hours, wash the area with warm soap and water and apply Vaseline jelly.  Many patients prefer to use Neosporin or Bacitracin ointment.  This is acceptable; however, know that you can develop an allergy to this medication even if you have used it safely for years.  It is important to keep the area moist.  Letting it dry out and get air slows healing time, and will worsen the scar.        If you notice increasing redness, tenderness, pain, or yellow drainage at the biopsy site, please notify your doctor.  These are signs of an infection.    If your biopsy site is bleeding, apply firm pressure for 15 minutes straight.  Repeat for another 15 minutes, if it is still bleeding.   If the surgical site continues to bleed, then please contact your doctor.      For [facility] users:   You will receive your biopsy results in [facility] as soon as they are available. Please be assured that your physician/provider will review your results and will then determine what further treatment, evaluation, or planning is required. You should be contacted by your physician's/provider's office within 5 business days of receiving your results; If not, please reach out to directly. This is one more way [facility] you [facility].     [facility] [place] [address]/ [phone] FAX/ [email or web address]     CRYOSURGERY      Your doctor has used a method called cryosurgery to treat your skin condition. Cryosurgery refers to the use of very cold substances to treat a variety of skin conditions such as warts, pre-skin cancers, molluscum contagiosum,  sun spots, and several benign growths. The substance we use in cryosurgery is liquid nitrogen and is so cold (-195 degrees Celsius) that is burns when administered.     Following treatment in the office, the skin may immediately burn and become red. You may find the area around the lesion is affected as well. It is sometimes necessary to treat not only the lesion, but a small area of the surrounding normal skin to achieve a good response.     A blister, and even a blood filled blister, may form after treatment.   This is a normal response. If the blister is painful, it is acceptable to sterilize a needle and with rubbing alcohol and gently pop the blister. It is important that you gently wash the area with soap and warm water as the blister fluid may contain wart virus if a wart was treated. Do no remove the roof of the blister.     The area treated can take anywhere from 1-3 weeks to heal. Healing time depends on the kind skin lesion treated, the location, and how aggressively the lesion was treated. It is recommended that the areas treated are covered with Vaseline or bacitracin ointment and a band-aid. If a band-aid is not practical, just ointment applied several times per day will do. Keeping these areas moist will speed the healing time.    Treatment with liquid nitrogen can leave a scar. In dark skin, it may be a light or dark scar, in light skin it may be a white or pink scar. These will generally fade with time, but may never go away completely.     If you have any concerns after your treatment, please feel free to call the office.       [facility] [place] [address]/ [phone] FAX/ [email or web address] What Are the Symptoms of Skin Cancer?  A change in your skin is the most common sign of skin cancer. This could be a new growth, a sore that doesnt heal, or a change in a mole. Not all skin cancers look the same.    For melanoma specifically, a simple way to remember the warning  signs is to remember the A-B-C-D-Es of melanoma--    A stands for asymmetrical. Does the mole or spot have an irregular shape with two parts that look very different?  B stands for border. Is the border irregular or jagged?  C is for color. Is the color uneven?  D is for diameter. Is the mole or spot larger than the size of a pea?  E is for evolving. Has the mole or spot changed during the past few weeks or months?    Talk to your doctor if you notice changes in your skin such as a new growth, a sore that doesnt heal, a change in an old growth, or any of the A-B-C-D-Es of melanoma    What Can I Do to Reduce My Risk of Skin Cancer?  Protection from ultraviolet (UV) radiation is important all year, not just during the summer or at the beach. UV rays from the sun can reach you on cloudy and hazy days, not just on bright and waldo days. UV rays also reflect off of surfaces like water, cement, sand, and snow. Indoor tanning (using a tanning bed, randolph, or sunlamp to get tan) exposes users to UV radiation.    The hours between 10 a.m. and 4 p.m. Daylight Saving Time (9 a.m. to 3 p.m. standard time) are the most hazardous for UV exposure outdoors in the continental [place]. UV rays from sunlight are the greatest during the late spring and early summer in [place].    CDC recommends easy options for protection from UV radiation--    Stay in the shade or indoors, especially during midday hours.  Wear clothing that covers your arms and legs.  Wear a hat with a wide brim to shade your face, head, ears, and neck.  Wear sunglasses that wrap around and block both UVA and UVB rays.  Use sunscreen with a sun protection factor (SPF) of 30 or higher, and both UVA and UVB (broad spectrum) protection.  Avoid indoor tanning.    Adapted from https://www.cdc.gov/cancer/skin/basic_info/

## 2024-01-22 ENCOUNTER — PATIENT MESSAGE (OUTPATIENT)
Dept: TRANSPLANT | Facility: CLINIC | Age: 69
End: 2024-01-22
Payer: MEDICARE

## 2024-01-24 LAB
FINAL PATHOLOGIC DIAGNOSIS: NORMAL
FROZEN SECTION DIAGNOSIS: NORMAL
GROSS: NORMAL
Lab: NORMAL
MICROSCOPIC EXAM: NORMAL

## 2024-01-29 NOTE — PROGRESS NOTES
1. 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  2. Skin, right neck, shave biopsy:  - SQUAMOUS CELL CARCINOMA IN SITU.  - THE TUMOR CLOSELY APPROACHES A PERIPHERAL BIOPSY MARGIN.  3. Skin, left hand, proximal, shave biopsy:  - SQUAMOUS CELL CARCINOMA IN SITU.  - MARGINS ARE NEGATIVE IN THE PLANES OF SECTION.  4. Skin, left hand, distal, shave biopsy:  - HYPERTROPHIC ACTINIC KERATOSIS.    Please call to discuss results / plan / schedule:   1 - Benign, no further treatment necessary.   2, 3 - SCCIS; rec Mohs surgery for definitive treatment given high risk location of neck and hand in transplant patient. Please forward to Mohs team once pt informed.   4 - thick precancer, likely treated with bx itself, will monitor.   F/u skin check in 3 months as scheduled.   Thank you!

## 2024-02-08 DIAGNOSIS — C44.629 SQUAMOUS CELL CARCINOMA OF DORSUM OF LEFT HAND: ICD-10-CM

## 2024-02-08 DIAGNOSIS — C44.42 SQUAMOUS CELL CARCINOMA, SCALP/NECK: Primary | ICD-10-CM

## 2024-03-13 ENCOUNTER — PROCEDURE VISIT (OUTPATIENT)
Dept: DERMATOLOGY | Facility: CLINIC | Age: 69
End: 2024-03-13
Payer: MEDICARE

## 2024-03-13 VITALS
WEIGHT: 198 LBS | HEART RATE: 66 BPM | DIASTOLIC BLOOD PRESSURE: 85 MMHG | HEIGHT: 70 IN | BODY MASS INDEX: 28.35 KG/M2 | SYSTOLIC BLOOD PRESSURE: 131 MMHG

## 2024-03-13 DIAGNOSIS — C44.629 SQUAMOUS CELL CARCINOMA OF DORSUM OF LEFT HAND: ICD-10-CM

## 2024-03-13 DIAGNOSIS — D04.4 SQUAMOUS CELL CARCINOMA IN SITU OF SKIN OF NECK: Primary | ICD-10-CM

## 2024-03-13 PROCEDURE — 13132 CMPLX RPR F/C/C/M/N/AX/G/H/F: CPT | Mod: 51,S$GLB,, | Performed by: DERMATOLOGY

## 2024-03-13 PROCEDURE — 17311 MOHS 1 STAGE H/N/HF/G: CPT | Mod: S$GLB,,, | Performed by: DERMATOLOGY

## 2024-03-13 PROCEDURE — 13133 CMPLX RPR F/C/C/M/N/AX/G/H/F: CPT | Mod: S$GLB,,, | Performed by: DERMATOLOGY

## 2024-03-13 NOTE — PROGRESS NOTES
MOHS MICROGRAPHIC SURGERY OPERATIVE NOTE  Name:  Raymundo Restrepo  Date: 3/13/2024  Patient : 1955  Attending Surgeon: Reynold Alejo MD  Assistants: Dominique Degroot - Surgical Technician  Anesthetic Agent: 1% lidocaine with 1:100,000 epinephrine  Clinical Diagnosis: squamous cell carcinoma in situ  Operation: Mohs Micrographic Surgery  Location: left hand proximal  Indications: Biopsy-proven skin cancer of cosmetically and functionally important areas, including head, neck, genital, hand, foot, or areas known for having difficulty in healing, such as the lower anterior legs.  Surgical Preparation: povidone-iodine     Description of Operation:  The nature and purpose of the procedure, associated risks and alternative treatments were explained to the patient in detail. All patient questions were answered completely. An informed operative consent and photography permit were obtained. The tumor location was then identified and marked with agreement by the patient of the correct location. The patient was positioned, prepped, and draped in the usual sterile manner. Local anesthesia was obtained with 3 cc(s) of 1% lidocaine with 1:100,000 epinephrine. The lesion pre-operatively measures 0.7 by 0.7 cm.     1ST STAGE:  A 2+ mm rim of normal appearing skin was marked circumferentially around the lesion after scraping with a curette to define the margin. The area thus outlined was excised at a 45 degree angle. Hemostasis was obtained with electrodesiccation. The specimen was oriented, mapped, and subdivided into at least two sections. Sections were then chromacoded and submitted for horizontal frozen sections. The patient tolerated the procedure well and there were no complications. Upon microscopic examination of the processed horizontal frozen sections of this stage:  No residual tumor was noted.  Tumor type noted on stage 1: No tumor seen.      SUMMARY:  The tumor was extirpated in 1 Mohs Stages resulting in a  final defect measuring 1.3 by 1.2 cm².   The final defect extended deep to subcutaneous fat  The patient tolerated the procedure well and no complications were noted.     PHOTOS:        Reynold Alejo MD  Complex Linear Closure Operative Note  Name: Raymundo Restrepo  YOB: 1955  Date: 3/13/2024  Attending Surgeon: Reynold Alejo MD FAAD  Assistants:  Dominique Degroot - Surgical Technician  Clinical Diagnosis: A 1.3 by 1.2 cm defect secondary to Mohs Micrographic Surgery  Location: left hand proximal  Operation: Complex linear closure  Surgical Preparation: broad scrub with povidone-iodine    Description of Operation:  The nature and purpose of the procedure, associated risks and alternative treatments were explained to the patient in detail. All patient questions were answered completely. In order to minimize tension on the closure and avoid compromising the anatomic contour of the cosmetic region and maximize functional capacity, a complex linear closure was performed. An informed operative consent and photography permit were obtained. The patient was positioned, prepped, and draped in the usual sterile manner. Local anesthesia was obtained with 6 cc(s) of 1% lidocaine with 1:100,000 epinephrine.    The defect edges were debeveled with a scalpel blade. The circular defect was widely undermined in the deep subcutaneous plane at least 100% of the defect diameter to allow for maximum tissue movement. Hemostasis was achieved with spot electrodessication. The defect was closed first utilizing multiple 5-0 Vicryl interrupted buried subcutaneous sutures. This resulted in excellent apposition of the dermis and epidermis, however two redundant areas of tissue were created on opposite sides of the defect. Utilizing a #15 scalpel blade, two Burows triangles were excised to ensure a flat scar. Hemostasis was obtained with spot electrodessication. The skin edges throughout further fastened superficially with  6-0 Nylon. The final length of the repair was 4.8 cm. The patient tolerated the procedure well and there were no complications.    Polysporin, non-adherent gauze and a pressure dressing were applied to wound after gentle cleansing with saline.    Patient instructed in and provided with instructions for post-op care, will RTC in 10 days for suture removal    Post op meds: None    Photos:        MD LOGAN Cai

## 2024-03-13 NOTE — PROGRESS NOTES
MOHS MICROGRAPHIC SURGERY OPERATIVE NOTE  Name:  Raymundo Restrepo  Date: 3/13/2024  Patient : 1955  Attending Surgeon: Reynold Alejo MD  Assistants: Dominique Degroot - Surgical Technician  Anesthetic Agent: 1% lidocaine with 1:100,000 epinephrine  Clinical Diagnosis: squamous cell carcinoma in situ  Operation: Mohs Micrographic Surgery  Location: right neck  Indications: Location in non-mask areas of face where maximum conservation of tumor-free tissue is needed.  Surgical Preparation: povidone-iodine     Description of Operation:  The nature and purpose of the procedure, associated risks and alternative treatments were explained to the patient in detail. All patient questions were answered completely. An informed operative consent and photography permit were obtained. The tumor location was then identified and marked with agreement by the patient of the correct location. The patient was positioned, prepped, and draped in the usual sterile manner. Local anesthesia was obtained with 2 cc(s) of 1% lidocaine with 1:100,000 epinephrine. The lesion pre-operatively measures 0.7 by 0.4 cm.     1ST STAGE:  A 2+ mm rim of normal appearing skin was marked circumferentially around the lesion after scraping with a curette to define the margin. The area thus outlined was excised at a 45 degree angle. Hemostasis was obtained with electrodesiccation. The specimen was oriented, mapped, and subdivided into at least two sections. Sections were then chromacoded and submitted for horizontal frozen sections. The patient tolerated the procedure well and there were no complications. Upon microscopic examination of the processed horizontal frozen sections of this stage:  No residual tumor was noted.  Tumor type noted on stage 1: No tumor seen.      SUMMARY:  The tumor was extirpated in 1 Mohs Stages resulting in a final defect measuring 1.3 by 1.0 cm².   The final defect extended deep to subcutaneous fat  The patient tolerated  the procedure well and no complications were noted.     PHOTOS:        Reynold Alejo MD  Complex Linear Closure Operative Note  Name: Raymundo Restrepo  YOB: 1955  Date: 3/13/2024  Attending Surgeon: Reynold Alejo MD FAAD  Assistants:  Dominique Degroot - Surgical Technician  Clinical Diagnosis: A 1.3 by 1.0 cm defect secondary to Mohs Micrographic Surgery  Location: right neck  Operation: Complex linear closure  Surgical Preparation: broad scrub with povidone-iodine    Description of Operation:  The nature and purpose of the procedure, associated risks and alternative treatments were explained to the patient in detail. All patient questions were answered completely. In order to minimize tension on the closure and avoid compromising the anatomic contour of the cosmetic region and maximize functional capacity, a complex linear closure was performed. An informed operative consent and photography permit were obtained. The patient was positioned, prepped, and draped in the usual sterile manner. Local anesthesia was obtained with 4 cc(s) of 1% lidocaine with 1:100,000 epinephrine.    The defect edges were debeveled with a scalpel blade. The circular defect was widely undermined in the deep subcutaneous plane at least 100% of the defect diameter to allow for maximum tissue movement. Hemostasis was achieved with spot electrodessication. The defect was closed first utilizing multiple 4-0 Vicryl interrupted buried subcutaneous sutures. This resulted in excellent apposition of the dermis and epidermis, however two redundant areas of tissue were created on opposite sides of the defect. Utilizing a #15 scalpel blade, two Burows triangles were excised to ensure a flat scar. Hemostasis was obtained with spot electrodessication. The skin edges throughout further fastened superficially with 5-0 Nylon. The final length of the repair was 4.1 cm. The patient tolerated the procedure well and there were no  complications.    Polysporin, non-adherent gauze and a pressure dressing were applied to wound after gentle cleansing with saline.    Patient instructed in and provided with instructions for post-op care, will RTC in 10 days for suture removal    Post op meds: None    Photos:        MD LOGAN Cai

## 2024-03-22 ENCOUNTER — CLINICAL SUPPORT (OUTPATIENT)
Dept: DERMATOLOGY | Facility: CLINIC | Age: 69
End: 2024-03-22
Payer: MEDICARE

## 2024-03-22 DIAGNOSIS — Z48.02 VISIT FOR SUTURE REMOVAL: Primary | ICD-10-CM

## 2024-03-22 PROCEDURE — 99999 PR PBB SHADOW E&M-EST. PATIENT-LVL I: CPT | Mod: PBBFAC,,,

## 2024-03-22 PROCEDURE — 99024 POSTOP FOLLOW-UP VISIT: CPT | Mod: S$GLB,,, | Performed by: DERMATOLOGY

## 2024-03-22 NOTE — PROGRESS NOTES
Mohs Surgery Suture Removal Note   Patient Name: Raymundo Restrepo  Patient : 1955  Date of Service: 3/22/2024    CC: Pt. Presents for removal of sutures on the hand and neck today  Subjective: patient reports wound healing as expected     Objective: Wound well healed, sutures clean/dry/intact. No evidence of any complications noted.     Visit For Suture Removal:  - Suture removal today  - Steri strips/mastisol were not applied  - Patient counseled on silicone gel/silicone sheeting and their use in the management of post-operative scars  - Handout on silicone gel/silicone gel sheeting was provided  - Patient to follow up with their referring provider in 3 months for further skin evaluation    Ariane Murray

## 2024-04-22 ENCOUNTER — OFFICE VISIT (OUTPATIENT)
Dept: DERMATOLOGY | Facility: CLINIC | Age: 69
End: 2024-04-22
Payer: MEDICARE

## 2024-04-22 VITALS — RESPIRATION RATE: 18 BRPM | BODY MASS INDEX: 28.35 KG/M2 | WEIGHT: 198 LBS | HEIGHT: 70 IN

## 2024-04-22 DIAGNOSIS — D18.01 CHERRY ANGIOMA: ICD-10-CM

## 2024-04-22 DIAGNOSIS — Z12.83 SCREENING FOR SKIN CANCER: ICD-10-CM

## 2024-04-22 DIAGNOSIS — D48.5 NEOPLASM OF UNCERTAIN BEHAVIOR OF SKIN: ICD-10-CM

## 2024-04-22 DIAGNOSIS — L73.8 SEBACEOUS HYPERPLASIA: ICD-10-CM

## 2024-04-22 DIAGNOSIS — L57.0 AK (ACTINIC KERATOSIS): ICD-10-CM

## 2024-04-22 DIAGNOSIS — L82.1 SK (SEBORRHEIC KERATOSIS): ICD-10-CM

## 2024-04-22 DIAGNOSIS — Z85.828 HISTORY OF NONMELANOMA SKIN CANCER: ICD-10-CM

## 2024-04-22 DIAGNOSIS — D84.9 IMMUNOCOMPROMISED STATE: ICD-10-CM

## 2024-04-22 DIAGNOSIS — L81.4 LENTIGINES: ICD-10-CM

## 2024-04-22 DIAGNOSIS — D22.9 MULTIPLE BENIGN NEVI: Primary | ICD-10-CM

## 2024-04-22 PROCEDURE — 3008F BODY MASS INDEX DOCD: CPT | Mod: CPTII,S$GLB,, | Performed by: DERMATOLOGY

## 2024-04-22 PROCEDURE — 1159F MED LIST DOCD IN RCRD: CPT | Mod: CPTII,S$GLB,, | Performed by: DERMATOLOGY

## 2024-04-22 PROCEDURE — 3288F FALL RISK ASSESSMENT DOCD: CPT | Mod: CPTII,S$GLB,, | Performed by: DERMATOLOGY

## 2024-04-22 PROCEDURE — 17003 DESTRUCT PREMALG LES 2-14: CPT | Mod: S$GLB,,, | Performed by: DERMATOLOGY

## 2024-04-22 PROCEDURE — 99213 OFFICE O/P EST LOW 20 MIN: CPT | Mod: 25,S$GLB,, | Performed by: DERMATOLOGY

## 2024-04-22 PROCEDURE — 4010F ACE/ARB THERAPY RXD/TAKEN: CPT | Mod: CPTII,S$GLB,, | Performed by: DERMATOLOGY

## 2024-04-22 PROCEDURE — 88305 TISSUE EXAM BY PATHOLOGIST: CPT | Mod: 26,,, | Performed by: DERMATOLOGY

## 2024-04-22 PROCEDURE — 11102 TANGNTL BX SKIN SINGLE LES: CPT | Mod: S$GLB,,, | Performed by: DERMATOLOGY

## 2024-04-22 PROCEDURE — 99999 PR PBB SHADOW E&M-EST. PATIENT-LVL IV: CPT | Mod: PBBFAC,,, | Performed by: DERMATOLOGY

## 2024-04-22 PROCEDURE — 17000 DESTRUCT PREMALG LESION: CPT | Mod: XS,S$GLB,, | Performed by: DERMATOLOGY

## 2024-04-22 PROCEDURE — 88305 TISSUE EXAM BY PATHOLOGIST: CPT | Mod: 59,PO | Performed by: DERMATOLOGY

## 2024-04-22 PROCEDURE — 11103 TANGNTL BX SKIN EA SEP/ADDL: CPT | Mod: S$GLB,,, | Performed by: DERMATOLOGY

## 2024-04-22 PROCEDURE — 1101F PT FALLS ASSESS-DOCD LE1/YR: CPT | Mod: CPTII,S$GLB,, | Performed by: DERMATOLOGY

## 2024-04-22 NOTE — PROGRESS NOTES
"  Subjective:       Patient ID:  Raymundo Restrepo is a 69 y.o. male who presents for   Chief Complaint   Patient presents with    Skin Check     HPI  Established patient.  Here today for total body skin exam.    Immunosuppressed: hx kidney transplant 2010, maintained on Prograf and Cellcept.  Hx of NMSC as below, AK.   C/o possibly recurrent lesion at R preauricular.     1. 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  2. Skin, right neck, shave biopsy:  - SQUAMOUS CELL CARCINOMA IN SITU.  - THE TUMOR CLOSELY APPROACHES A PERIPHERAL BIOPSY MARGIN.  3. Skin, left hand, proximal, shave biopsy:  - SQUAMOUS CELL CARCINOMA IN SITU.  - MARGINS ARE NEGATIVE IN THE PLANES OF SECTION.  4. Skin, left hand, distal, shave biopsy:  - HYPERTROPHIC ACTINIC KERATOSIS.    Derm Hx:    SCCIS at R neck s/p Mohs 3/2024  SCCIS at L hand s/p Mohs 3/2024  SCCIS at L ear s/p Efudex 4 week course in 11-12/2022; recurrent on bx 3/2023 s/p Mohs with SSC 6/2023   SCC, moderately differentiated, at L upper lip s/p Mohs 12/2022   BCC at L preauricular s/p Mohs 12/2022  Persistent "crateriform squamoproliferative lesion" at R preauricular [superior] s/p WLE with ENT 12/2022 (neg margins); prior failure with multiple LN2, Efudex courses  SCCIS (focal) arising within HAK at vertex scalp s/p shave bx 7/2022, EDC and subsequent Efudex course 8/2022  SCCIS at L dorsal hand s/p EDC 8/2023  SCCIS at R preauricular cheek (inferior) s/p Mohs 2018  SCCIS at L neck s/p EDC 2017, Efudex course 2019  SCCIS at L dorsal wrist s/p EDC 2017  SCCIS at R posterior neck s/p shave bx 2017     Review of Systems   Skin:  Positive for activity-related sunscreen use. Negative for daily sunscreen use and wears hat.   Hematologic/Lymphatic: Bruises/bleeds easily.        Objective:    Physical Exam   Constitutional: He appears well-developed and well-nourished.   Neurological: He is alert and " oriented to person, place, and time.   Psychiatric: He has a normal mood and affect.   Skin:   Areas Examined (abnormalities noted in diagram):   Scalp / Hair Palpated and Inspected  Head / Face Inspection Performed  Neck Inspection Performed  Chest / Axilla Inspection Performed  Abdomen Inspection Performed  Genitals / Buttocks / Groin Inspection Performed  Back Inspection Performed  RUE Inspected  LUE Inspection Performed  RLE Inspected  LLE Inspection Performed  Nails and Digits Inspection Performed                       Diagram Legend     Erythematous scaling macule/papule c/w actinic keratosis       Vascular papule c/w angioma      Pigmented verrucoid papule/plaque c/w seborrheic keratosis      Yellow umbilicated papule c/w sebaceous hyperplasia      Irregularly shaped tan macule c/w lentigo     1-2 mm smooth white papules consistent with Milia      Movable subcutaneous cyst with punctum c/w epidermal inclusion cyst      Subcutaneous movable cyst c/w pilar cyst      Firm pink to brown papule c/w dermatofibroma      Pedunculated fleshy papule(s) c/w skin tag(s)      Evenly pigmented macule c/w junctional nevus     Mildly variegated pigmented, slightly irregular-bordered macule c/w mildly atypical nevus      Flesh colored to evenly pigmented papule c/w intradermal nevus       Pink pearly papule/plaque c/w basal cell carcinoma      Erythematous hyperkeratotic cursted plaque c/w SCC      Surgical scar with no sign of skin cancer recurrence      Open and closed comedones      Inflammatory papules and pustules      Verrucoid papule consistent consistent with wart     Erythematous eczematous patches and plaques     Dystrophic onycholytic nail with subungual debris c/w onychomycosis     Umbilicated papule    Erythematous-base heme-crusted tan verrucoid plaque consistent with inflamed seborrheic keratosis     Erythematous Silvery Scaling Plaque c/w Psoriasis     See annotation            Assessment / Plan:       Pathology Orders:       Normal Orders This Visit    Specimen to Pathology, Dermatology     Comments:    Number of Specimens:->2  ------------------------->-------------------------  Spec 1 Procedure:->Biopsy  Spec 1 Clinical Impression:->isk r/o recurrent atypical  squamous proliferation s/p excision 12/2022  Spec 1 Source:->right preauricular  ------------------------->-------------------------  Spec 2 Procedure:->Biopsy  Spec 2 Clinical Impression:->nevus r/o atypia  Spec 2 Source:->right superior calf  Release to patient->Immediate    Questions:    Procedure Type: Dermatology and skin neoplasms    Number of Specimens: 2    ------------------------: -------------------------    Spec 1 Procedure: Biopsy    Spec 1 Clinical Impression: isk r/o recurrent atypical squamous proliferation s/p excision 12/2022    Spec 1 Source: right preauricular    ------------------------: -------------------------    Spec 2 Procedure: Biopsy    Spec 2 Clinical Impression: nevus r/o atypia    Spec 2 Source: right superior calf    Release to patient: Immediate          NUB  - Discussed diagnosis with patient and explained uncertain nature of condition, including ddx.   - Discussed treatment options (biopsy, close monitoring) with patient, including the risks and benefits of each. Patient opted to pursue biopsy.  - Shave Biopsy Procedure Note: Discussed procedure with patient/patient's guardian including risks and benefits as well as treatment alternatives. Risks of procedure include pain, bleeding, infection, post-inflammatory pigmentary alteration, scar, recurrence. Patient informed that the purpose of a biopsy is sampling of condition in question rather than removal in entirety; further treatment may be necessary. Verbal consent obtained. Area to be biopsied marked and cleansed with alcohol. Local anesthesia achieved by injecting approximately 1 cc of 1% lidocaine with epinephrine. Two shave biopsies performed using a double edge  razor blade; specimens submitted to pathology. Hemostasis achieved with aluminum chloride. Petroleum jelly and bandage applied to wounds. Patient tolerated procedures well. After-visit wound care instructions reviewed and provided in writing.        AK  - Discussed diagnosis, etiology, and precancerous nature of condition.   - Cryosurgery Procedure Note: Discussed procedure with patient/patient's guardian including risks and benefits as well as treatment alternatives. Risks of procedure include pain, itching, swelling, redness, blistering, crusting, wound formation, post-inflammatory pigmentary alteration, scar, recurrence. Verbal consent obtained. LN2 cryosurgery performed to 11 lesion(s). Patient tolerated procedure well. After-visit wound care instructions reviewed and provided in writing.      Multiple benign nevi  - Discussed diagnosis, etiology, and benign-nature of condition.  - Reassured; no lesions suspicious for malignancy noted on exam today.   - Recommended routine self examination of skin. Discussed the ABCDEs of melanoma and ugly duckling sign.   - Recommended daily sun protection, including the use of OTC broad-spectrum sunscreen (SPF 30 or greater) and sun-protective clothing.      Lentigines  - Benign; reassured treatment not necessary.   - Recommended daily sun protection, including the use of OTC broad-spectrum sunscreen (SPF 30 or greater) and sun-protective clothing.       SK (seborrheic keratosis)  Cherry angioma  Sebaceous hyperplasia  - Benign; reassured treatment not necessary.      History of nonmelanoma skin cancer  Immunocompromised state  Screening for skin cancer  - Total body skin examination performed today.  - Findings listed above.   - Sites of prior malignancy examined - no concern for recurrence today (except at R preauricular, see above).   - Discussed increased risk of skin cancer with immunosuppression.   - Recommended routine self examination of skin.    - Recommended daily  sun protection, including the use of OTC broad-spectrum sunscreen (SPF 30 or greater) and sun-protective clothing.           Follow up in about 6 months (around 10/22/2024) for skin check, sooner pending pathology, sooner PRN.

## 2024-04-23 NOTE — PATIENT INSTRUCTIONS
Shave Biopsy Wound Care    Your doctor has performed a shave biopsy today.  A band aid and vaseline ointment has been placed over the site.  This should remain in place for NO LONGER THAN 48 hours.  It is fine to remove the bandaid after 24 hours, if the area is no longer bleeding. It is recommended that you keep the area dry (do not wet)) for the first 24 hours.  After 24 hours, wash the area with warm soap and water and apply Vaseline jelly.  Many patients prefer to use Neosporin or Bacitracin ointment.  This is acceptable; however, know that you can develop an allergy to this medication even if you have used it safely for years.  It is important to keep the area moist.  Letting it dry out and get air slows healing time, and will worsen the scar.        If you notice increasing redness, tenderness, pain, or yellow drainage at the biopsy site, please notify your doctor.  These are signs of an infection.    If your biopsy site is bleeding, apply firm pressure for 15 minutes straight.  Repeat for another 15 minutes, if it is still bleeding.   If the surgical site continues to bleed, then please contact your doctor.      For [facility] users:   You will receive your biopsy results in [facility] as soon as they are available. Please be assured that your physician/provider will review your results and will then determine what further treatment, evaluation, or planning is required. You should be contacted by your physician's/provider's office within 5 business days of receiving your results; If not, please reach out to directly. This is one more way [facility] is putting you first.     [facility] [place] [address]/ [phone] FAX/ [email or web address]     CRYOSURGERY      Your doctor has used a method called cryosurgery to treat your skin condition. Cryosurgery refers to the use of very cold substances to treat a variety of skin conditions such as warts, pre-skin cancers, molluscum contagiosum,  sun spots, and several benign growths. The substance we use in cryosurgery is liquid nitrogen and is so cold (-195 degrees Celsius) that is burns when administered.     Following treatment in the office, the skin may immediately burn and become red. You may find the area around the lesion is affected as well. It is sometimes necessary to treat not only the lesion, but a small area of the surrounding normal skin to achieve a good response.     A blister, and even a blood filled blister, may form after treatment.   This is a normal response. If the blister is painful, it is acceptable to sterilize a needle and with rubbing alcohol and gently pop the blister. It is important that you gently wash the area with soap and warm water as the blister fluid may contain wart virus if a wart was treated. Do no remove the roof of the blister.     The area treated can take anywhere from 1-3 weeks to heal. Healing time depends on the kind skin lesion treated, the location, and how aggressively the lesion was treated. It is recommended that the areas treated are covered with Vaseline or bacitracin ointment and a band-aid. If a band-aid is not practical, just ointment applied several times per day will do. Keeping these areas moist will speed the healing time.    Treatment with liquid nitrogen can leave a scar. In dark skin, it may be a light or dark scar, in light skin it may be a white or pink scar. These will generally fade with time, but may never go away completely.     If you have any concerns after your treatment, please feel free to call the office.       [facility] [place] [address]/ [phone] FAX/ [email or web address] What Are the Symptoms of Skin Cancer?  A change in your skin is the most common sign of skin cancer. This could be a new growth, a sore that doesnt heal, or a change in a mole. Not all skin cancers look the same.    For melanoma specifically, a simple way to remember the warning  signs is to remember the A-B-C-D-Es of melanoma--    A stands for asymmetrical. Does the mole or spot have an irregular shape with two parts that look very different?  B stands for border. Is the border irregular or jagged?  C is for color. Is the color uneven?  D is for diameter. Is the mole or spot larger than the size of a pea?  E is for evolving. Has the mole or spot changed during the past few weeks or months?    Talk to your doctor if you notice changes in your skin such as a new growth, a sore that doesnt heal, a change in an old growth, or any of the A-B-C-D-Es of melanoma    What Can I Do to Reduce My Risk of Skin Cancer?  Protection from ultraviolet (UV) radiation is important all year, not just during the summer or at the beach. UV rays from the sun can reach you on cloudy and hazy days, not just on bright and waldo days. UV rays also reflect off of surfaces like water, cement, sand, and snow. Indoor tanning (using a tanning bed, randolph, or sunlamp to get tan) exposes users to UV radiation.    The hours between 10 a.m. and 4 p.m. Daylight Saving Time (9 a.m. to 3 p.m. standard time) are the most hazardous for UV exposure outdoors in the continental [place]. UV rays from sunlight are the greatest during the late spring and early summer in [place].    CDC recommends easy options for protection from UV radiation--    Stay in the shade or indoors, especially during midday hours.  Wear clothing that covers your arms and legs.  Wear a hat with a wide brim to shade your face, head, ears, and neck.  Wear sunglasses that wrap around and block both UVA and UVB rays.  Use sunscreen with a sun protection factor (SPF) of 30 or higher, and both UVA and UVB (broad spectrum) protection.  Avoid indoor tanning.    Adapted from https://www.cdc.gov/cancer/skin/basic_info/

## 2024-04-26 LAB
FINAL PATHOLOGIC DIAGNOSIS: NORMAL
GROSS: NORMAL
Lab: NORMAL
MICROSCOPIC EXAM: NORMAL

## 2024-04-29 NOTE — PROGRESS NOTES
1. Skin, right preauricular, shave biopsy:   - HYPERTROPHIC ACTINIC KERATOSIS  2. Skin, Right superior calf,  shave biopsy:   - COMPOUND NEVUS    Please call to discuss results / plan / schedule:  1) biopsy showing thick precancer. Possibly treated via biopsy itself. Allow to heal and recheck in 1 month (focused visit). Please schedule.   2) benign, no further treatment necessary.   Thank you!

## 2024-06-05 ENCOUNTER — OFFICE VISIT (OUTPATIENT)
Dept: DERMATOLOGY | Facility: CLINIC | Age: 69
End: 2024-06-05
Payer: MEDICARE

## 2024-06-05 VITALS — BODY MASS INDEX: 28.35 KG/M2 | RESPIRATION RATE: 18 BRPM | HEIGHT: 70 IN | WEIGHT: 198 LBS

## 2024-06-05 DIAGNOSIS — L90.5 SCAR: Primary | ICD-10-CM

## 2024-06-05 DIAGNOSIS — L57.0 AK (ACTINIC KERATOSIS): ICD-10-CM

## 2024-06-05 PROCEDURE — 3288F FALL RISK ASSESSMENT DOCD: CPT | Mod: CPTII,S$GLB,, | Performed by: DERMATOLOGY

## 2024-06-05 PROCEDURE — 17000 DESTRUCT PREMALG LESION: CPT | Mod: S$GLB,,, | Performed by: DERMATOLOGY

## 2024-06-05 PROCEDURE — 17003 DESTRUCT PREMALG LES 2-14: CPT | Mod: S$GLB,,, | Performed by: DERMATOLOGY

## 2024-06-05 PROCEDURE — 1101F PT FALLS ASSESS-DOCD LE1/YR: CPT | Mod: CPTII,S$GLB,, | Performed by: DERMATOLOGY

## 2024-06-05 PROCEDURE — 1159F MED LIST DOCD IN RCRD: CPT | Mod: CPTII,S$GLB,, | Performed by: DERMATOLOGY

## 2024-06-05 PROCEDURE — 99212 OFFICE O/P EST SF 10 MIN: CPT | Mod: 25,S$GLB,, | Performed by: DERMATOLOGY

## 2024-06-05 PROCEDURE — 3008F BODY MASS INDEX DOCD: CPT | Mod: CPTII,S$GLB,, | Performed by: DERMATOLOGY

## 2024-06-05 PROCEDURE — 99999 PR PBB SHADOW E&M-EST. PATIENT-LVL III: CPT | Mod: PBBFAC,,, | Performed by: DERMATOLOGY

## 2024-06-05 PROCEDURE — 4010F ACE/ARB THERAPY RXD/TAKEN: CPT | Mod: CPTII,S$GLB,, | Performed by: DERMATOLOGY

## 2024-06-05 NOTE — PROGRESS NOTES
"  Subjective:       Patient ID:  Raymundo Restrepo is a 69 y.o. male who presents for   Chief Complaint   Patient presents with    Follow-up     HPI  Established patient.  Here today for 6 week f/u biopsy at R pre-auricular, see below. Pt reports site has healed well. Notes some rough spots at hands / forearms.   LV TBSE - bx as below, AK treated with cryotherapy, otherwise only benign lesions noted.   Immunosuppressed: hx kidney transplant 2010, maintained on Prograf and Cellcept.  Hx of NMSC, AK.   C/o possibly recurrent lesion at R preauricular.     5/2024  1. Skin, right preauricular, shave biopsy:   - HYPERTROPHIC ACTINIC KERATOSIS  2. Skin, Right superior calf,  shave biopsy:   - COMPOUND NEVUS    Derm Hx:    SCCIS at R neck s/p Mohs 3/2024  SCCIS at L hand s/p Mohs 3/2024  SCCIS at L ear s/p Efudex 4 week course in 11-12/2022; recurrent on bx 3/2023 s/p Mohs with SSC 6/2023   SCC, moderately differentiated, at L upper lip s/p Mohs 12/2022   BCC at L preauricular s/p Mohs 12/2022  Persistent "crateriform squamoproliferative lesion" at R preauricular [superior] s/p WLE with ENT 12/2022 (neg margins); prior failure with multiple LN2, Efudex courses  SCCIS (focal) arising within HAK at vertex scalp s/p shave bx 7/2022, EDC and subsequent Efudex course 8/2022  SCCIS at L dorsal hand s/p EDC 8/2023  SCCIS at R preauricular cheek (inferior) s/p Mohs 2018  SCCIS at L neck s/p EDC 2017, Efudex course 2019  SCCIS at L dorsal wrist s/p EDC 2017  SCCIS at R posterior neck s/p shave bx 2017     Review of Systems   Skin:  Positive for activity-related sunscreen use. Negative for daily sunscreen use and wears hat.   Hematologic/Lymphatic: Bruises/bleeds easily.        Objective:    Physical Exam   Constitutional: He appears well-developed and well-nourished.   Neurological: He is alert and oriented to person, place, and time.   Psychiatric: He has a normal mood and affect.   Skin:                      Diagram Legend     " Erythematous scaling macule/papule c/w actinic keratosis       Vascular papule c/w angioma      Pigmented verrucoid papule/plaque c/w seborrheic keratosis      Yellow umbilicated papule c/w sebaceous hyperplasia      Irregularly shaped tan macule c/w lentigo     1-2 mm smooth white papules consistent with Milia      Movable subcutaneous cyst with punctum c/w epidermal inclusion cyst      Subcutaneous movable cyst c/w pilar cyst      Firm pink to brown papule c/w dermatofibroma      Pedunculated fleshy papule(s) c/w skin tag(s)      Evenly pigmented macule c/w junctional nevus     Mildly variegated pigmented, slightly irregular-bordered macule c/w mildly atypical nevus      Flesh colored to evenly pigmented papule c/w intradermal nevus       Pink pearly papule/plaque c/w basal cell carcinoma      Erythematous hyperkeratotic cursted plaque c/w SCC      Surgical scar with no sign of skin cancer recurrence      Open and closed comedones      Inflammatory papules and pustules      Verrucoid papule consistent consistent with wart     Erythematous eczematous patches and plaques     Dystrophic onycholytic nail with subungual debris c/w onychomycosis     Umbilicated papule    Erythematous-base heme-crusted tan verrucoid plaque consistent with inflamed seborrheic keratosis     Erythematous Silvery Scaling Plaque c/w Psoriasis     See annotation    Assessment / Plan:          Scar  Well healed scar; no sign of recurrence at this time. Continue to monitor.     AK (actinic keratosis)  - Discussed diagnosis, etiology, and precancerous nature of condition.   - Cryosurgery Procedure Note: Discussed procedure with patient/patient's guardian including risks and benefits as well as treatment alternatives. Risks of procedure include pain, itching, swelling, redness, blistering, crusting, wound formation, post-inflammatory pigmentary alteration, scar, recurrence. Verbal consent obtained. LN2 cryosurgery performed to 4 lesion(s). Patient  tolerated procedure well. After-visit wound care instructions reviewed and provided in writing.              Follow up in about 3 months (around 9/5/2024) for skin check, sooner PRN.

## 2024-06-05 NOTE — PATIENT INSTRUCTIONS

## 2024-08-07 LAB
EXT ALBUMIN: 3.9
EXT ALKALINE PHOSPHATASE: 66
EXT ALT: 9
EXT AST: 15
EXT BACTERIA UA: 0
EXT BILIRUBIN TOTAL: 0.5
EXT BUN: 25
EXT CALCIUM: 9.1
EXT CHLORIDE: 115
EXT CO2: 20
EXT CREATININE UA: 102.3
EXT CREATININE: 1.83 MG/DL
EXT EGFR NO RACE VARIABLE: 40
EXT EOSINOPHIL%: 2.4
EXT GLUCOSE UA: ABNORMAL
EXT GLUCOSE: 113
EXT HEMATOCRIT: 34.3
EXT HEMOGLOBIN: 10.6
EXT LYMPH%: 33.5
EXT MONOCYTES%: 6.7
EXT NITRITES UA: ABNORMAL
EXT PLATELETS: 177
EXT POTASSIUM: 4.7
EXT PROT/CREAT RATIO UR: 0.29
EXT PROTEIN TOTAL: 7
EXT PROTEIN UA: ABNORMAL
EXT RBC UA: 0
EXT SEGS%: 57.2
EXT SODIUM: 142 MMOL/L
EXT TACROLIMUS LVL: 7
EXT URINE PROTEIN: 29.6
EXT WBC UA: ABNORMAL
EXT WBC: 4.5

## 2024-08-07 NOTE — PROGRESS NOTES
Kidney Post-Transplant Assessment    Referring Physician: Chad Mayer  Current Nephrologist: Jean Crump    ORGAN: RIGHT KIDNEY  Donor Type: donation after brain death  PHS Increased Risk: no  Cold Ischemia: 814.2 mins  Induction Medications: steroids (prednisone,methylprednisolone,solumedrol,medrol,decadron), campath - alemtuzumab (anti-cd52)    Subjective:     CC:  Reassessment of renal allograft function and management of chronic immunosuppression.    HPI:  Mr. Restrepo is a 69 y.o. year old White male with history of PKD who received a donation after brain death kidney transplant on 8/28/10.  Patient also with a remote hx of Hodgkin's lymphoma, history of CAD s/p PCI (last one on 11/2010; on plavix) as well as multiple and aggressive SCC with last lip resection done 2022, and hx of C diff infection s/p fecal transplant x 2. He has CKD stage 3 - GFR 30-59 and his baseline creatinine is between 1.4-1.6. He takes mycophenolate mofetil and tacrolimus for maintenance immunosuppression.     ER visit in June for GI bleeding, diagnosed with acute diverticulitis for which he requested to be treated outpatient. BMs are back to normal now.     Following with general nephrology Dr. Crump. Transplant chart re-opened in December 2023 for proteinuria (roughly 1.5 grams).  Had visit with Dr. 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 He follows closely with Dr. Crump every 3 months.     Follows with dermatology Dr. Watters for skin checks, last visit in June.    Feels well without complaints. No problems with urination. BP at goal, no peripheral edema. Tolerating IS without issues, no diarrhea or vomiting.     Current Outpatient Medications   Medication Sig Dispense Refill    allopurinoL (ZYLOPRIM) 300 MG tablet Take 300 mg by mouth once daily.      amLODIPine (NORVASC) 5 MG tablet Take 5 mg by mouth once daily.       carvedilol (COREG) 12.5 MG tablet Take 12.5 mg by mouth 2 (two) times daily.       clopidogreL (PLAVIX) 75 mg tablet Take 1 tablet (75 mg total) by mouth once daily.      fluorouraciL (EFUDEX) 5 % cream AAA bid x 2-4 weeks 40 g 1    losartan (COZAAR) 25 MG tablet Take 25 mg by mouth.      multivitamin (THERAGRAN) per tablet Take 1 tablet by mouth Daily.      nitroGLYCERIN (NITROSTAT) 0.4 MG SL tablet Place 0.4 mg under the tongue.      ondansetron (ZOFRAN-ODT) 4 MG TbDL Take 1 tablet (4 mg total) by mouth every 6 (six) hours as needed (Nausea). 10 tablet 0    pantoprazole (PROTONIX) 40 MG tablet Take 40 mg by mouth once daily.      sodium bicarbonate 650 MG tablet Take 1,300 mg by mouth 2 (two) times daily.      acetaminophen (TYLENOL) 500 MG tablet Take 1 tablet (500 mg total) by mouth every 6 (six) hours as needed for Pain.      mycophenolate (CELLCEPT) 250 mg Cap Take 2 capsules (500 mg total) by mouth 2 (two) times daily.      tacrolimus (PROGRAF) 0.5 MG Cap Take 2 capsules (1 mg total) by mouth every morning AND 2 capsules (1 mg total) every evening. Txp Date:8/28/2010 (Kidney) Disch Date:8/31/2010 ICD10:Z94.0 Txp Location:[facility]. 120 capsule 11     No current facility-administered medications for this visit.     Facility-Administered Medications Ordered in Other Visits   Medication Dose Route Frequency Provider Last Rate Last Admin    fentaNYL 50 mcg/mL injection 25 mcg  25 mcg Intravenous Q5 Min PRN Nazario Kessler MD        lactated ringers infusion   Intravenous Continuous Nazario Kessler MD   New Bag at 12/21/22 1424    lactated ringers infusion  125 mL/hr Intravenous Continuous Nazario Kessler MD        LIDOcaine (PF) 10 mg/ml (1%) injection 10 mg  1 mL Intradermal Once Nazario Kessler MD        oxyCODONE immediate release tablet 5 mg  5 mg Oral Once PRN Nazario Kessler MD           Past Medical History:   Diagnosis Date    Basal cell carcinoma     C. difficile diarrhea     CAD (coronary  "artery disease), native coronary artery      Stent placed 2007 In-stent restenosis, required a 2nd stent 2010    -donor kidney transplant 2010    Gross hematuria     history of gross hematuria    Hiatal hernia     Hypertension 09/15/2014    Ischemic colitis     Kidney stones     Melanoma     Need for prophylactic immunotherapy     Non-Hodgkin's lymphoma     PKD (polycystic kidney disease)     Recurrent skin cancer     Renal hypertension     Ruptured cyst of kidney     Skin cancer     Squamous cell carcinoma of skin     TIA (transient ischemic attack)     x3, no residual     Review of Systems   Constitutional:  Negative for activity change and fever.   Eyes:  Positive for visual disturbance.   Respiratory:  Negative for cough and shortness of breath.    Cardiovascular:  Negative for chest pain and leg swelling.   Gastrointestinal:  Negative for abdominal pain, constipation, diarrhea and nausea.   Genitourinary:  Negative for difficulty urinating, frequency and hematuria.   Musculoskeletal:  Negative for arthralgias and myalgias.   Skin:  Negative for wound.   Allergic/Immunologic: Positive for immunocompromised state.   Neurological:  Negative for weakness.   Psychiatric/Behavioral:  Negative for sleep disturbance.        Objective:   Blood pressure 133/63, pulse 66, temperature 97.7 °F (36.5 °C), temperature source Temporal, resp. rate 18, height 5' 6" (1.676 m), weight 81.1 kg (178 lb 12.7 oz), SpO2 96%.body mass index is 28.86 kg/m².    Physical Exam  Vitals and nursing note reviewed.   Constitutional:       Appearance: Normal appearance.   Cardiovascular:      Rate and Rhythm: Normal rate and regular rhythm.      Heart sounds: Normal heart sounds.   Pulmonary:      Effort: Pulmonary effort is normal.      Breath sounds: Normal breath sounds.   Abdominal:      General: There is no distension.   Musculoskeletal:         General: Normal range of motion.   Skin:     General: Skin is " warm and dry.   Neurological:      Mental Status: He is alert.         Labs:  Lab Results   Component Value Date    WBC 4.41 2021    HGB 10.2 (L) 2021    HCT 32.5 (L) 2021     2021    K 4.3 2021     2021    CO2 15 (L) 2021    BUN 24 (H) 2021    CREATININE 1.6 (H) 2021    CALCIUM 9.1 2021    PHOS 2.6 (L) 2020    MG 1.8 2021    ALBUMIN 3.9 2021    AST 23 2021    ALT 17 2021       Lab Results   Component Value Date    EXTANC  2014      Comment:      Coming to Clinic on 9/15/14    EXTWBC 4.5 (L) 2024    EXTSEGS 57.2 2024    EXTPLATELETS 177 2024    EXTHEMOGLOBI 10.6 (L) 2024    EXTHEMATOCRI 34.3 (L) 2024    EXTCREATININ 1.83 (H) 2024    EXTCREATININ 102.3 2024    EXTSODIUM 142 2024    EXTPOTASSIUM 4.7 2024    EXTBUN 25 (H) 2024    EXTCO2 20 2024    EXTCALCIUM 9.1 2024    EXTPHOSPHORU 3.3 2024    EXTGLUCOSE 113 (H) 2024    EXTALBUMIN 3.9 2024    EXTAST 15 2024    EXTALT 9 2024    EXTBILITOTAL 0.5 2024       Lab Results   Component Value Date    EXTTACROLVL 7.0 2024    EXTPROTCRE 0.29 2024    EXTPTHINTACT 51.8 2023    EXTPROTEINUA Trace (A) 2024    EXTWBCUA 0-1 2024    EXTRBCUA 0 2024       Labs were reviewed with the patient.    Assessment:     1. -donor kidney transplant 8/28/10    2. Chronic immunosuppression with Prograf and Cellcept    3. Polycystic kidney disease    4. Stage 3b chronic kidney disease    5. Persistent proteinuria    6. Primary hypertension    7. At risk for opportunistic infections      Plan:   Mr. Restrepo is now nearing 14 years post kidney transplant with stable allograft function. He will continue following with general nephrology for CKD care, IS refills x 1 year provided. Stressed importance of routine health maintenance as well as cancer  screenings (PSA, colonoscopy, dermatology visits for skin checks) while on immunosuppression. He understands that transplant will re-open his chart in the future if issues arise with allograft.       1. Immunosuppression: Prograf trough 7.0, which is therapeutic (goal 5-7). Continue Prograf 1 mg BID and  mg BID.. Will continue to monitor for drug toxicities    2. Allograft Function: stable graft function, slowly worsening due to CAN/aging allograft; continue good CKD care and general nephrology follow up  - ESRD secondary to PKD s/p  donor kidney transplant on 2010    Latest Reference Range & Units 13yr 11mo,  Kidney-Post 14 Year  24 09:50   EXT Creatinine mg/dL 1.83 (H) (IP) (E)       3. Hypertension management: advise low salt diet and home BP monitoring      4. Proteinuria: improved, last UPC 0.29   - chart initially re-opened last year due to increased proteinuria about 1.5 g, Dr. Sanchez felt likely CAN due to aging allograft   - continue tight BP control    5. hx of CAD s/p PCI  - on Plavix  - follows with Dr. Giraldo annually    6. hx of SCC   - follows with dermatology Dr. Watters    7. hx of C diff infection   - s/p fecal transplant x 2    8. BK virus infection screening:  BK PCR per protocol       Follow-up:   Annual follow-up with kidney transplant clinic per written guidelines.  Patient also reminded to follow-up with general nephrologist.    Labs: since patient remains at high risk for rejection and drug-related complications that warrant close monitoring, labs will be ordered as follows: continue twice weekly CBC, renal panel, and drug level for first month; then same labs once weekly through 3rd month post-transplant.  Urine for UA and protein/creatinine ratio monthly.  Serum BK - PCR at 1-, 3-, 6-, 9-, 12-, 18-, 24-, 36-, 48-, and 60 months post-transplant.  Hepatic panel at 1-, 2-, 3-, 6-, 9-, 12-, 18-, 24-, and 36- months post-transplant.    Lesley Becker NP

## 2024-08-16 ENCOUNTER — OFFICE VISIT (OUTPATIENT)
Dept: TRANSPLANT | Facility: CLINIC | Age: 69
End: 2024-08-16
Payer: MEDICARE

## 2024-08-16 VITALS
HEIGHT: 66 IN | DIASTOLIC BLOOD PRESSURE: 63 MMHG | SYSTOLIC BLOOD PRESSURE: 133 MMHG | BODY MASS INDEX: 28.74 KG/M2 | HEART RATE: 66 BPM | OXYGEN SATURATION: 96 % | WEIGHT: 178.81 LBS | RESPIRATION RATE: 18 BRPM | TEMPERATURE: 98 F

## 2024-08-16 DIAGNOSIS — N18.32 STAGE 3B CHRONIC KIDNEY DISEASE: ICD-10-CM

## 2024-08-16 DIAGNOSIS — Q61.3 POLYCYSTIC KIDNEY DISEASE: ICD-10-CM

## 2024-08-16 DIAGNOSIS — R80.1 PERSISTENT PROTEINURIA: ICD-10-CM

## 2024-08-16 DIAGNOSIS — I10 PRIMARY HYPERTENSION: Chronic | ICD-10-CM

## 2024-08-16 DIAGNOSIS — Z91.89 AT RISK FOR OPPORTUNISTIC INFECTIONS: ICD-10-CM

## 2024-08-16 DIAGNOSIS — Z94.0 DECEASED-DONOR KIDNEY TRANSPLANT: Primary | ICD-10-CM

## 2024-08-16 DIAGNOSIS — Z29.89 NEED FOR PROPHYLACTIC IMMUNOTHERAPY: ICD-10-CM

## 2024-08-16 PROCEDURE — 99999 PR PBB SHADOW E&M-EST. PATIENT-LVL IV: CPT | Mod: PBBFAC,,, | Performed by: NURSE PRACTITIONER

## 2024-08-16 RX ORDER — MYCOPHENOLATE MOFETIL 250 MG/1
500 CAPSULE ORAL 2 TIMES DAILY
Start: 2024-08-16 | End: 2025-08-16

## 2024-08-16 RX ORDER — TACROLIMUS 0.5 MG/1
CAPSULE ORAL
Qty: 120 CAPSULE | Refills: 11 | Status: SHIPPED | OUTPATIENT
Start: 2024-08-16 | End: 2025-08-16

## 2024-08-16 NOTE — LETTER
August 16, 2024        Jean Crump  [address] [place] [address]  [place] [address]  Phone: [phone]  Fax: [phone]             [facility]  [address]  [facility] [address]  Phone: [phone]   Patient: Raymundo Restrepo   MR Number: [id number]   YOB: 1955   Date of Visit: 8/16/2024       Dear Dr. Jean Crump    Thank you for referring Raymundo Restrepo to me for evaluation. Attached you will find relevant portions of my assessment and plan of care.    If you have questions, please do not hesitate to call me. I look forward to following Raymundo Restrepo along with you.    Sincerely,    Lesley Becker, MERARI    Enclosure    If you would like to receive this communication electronically, please contact [email or web address] or [phone] to request [employer] access.    [employer] is a tool which provides read-only access to select patient information with whom you have a relationship. Its easy to use and provides real time access to review your patients record including encounter summaries, notes, results, and demographic information.    If you feel you have received this communication in error or would no longer like to receive these types of communications, please e-mail [email or web address]

## 2024-10-23 ENCOUNTER — OFFICE VISIT (OUTPATIENT)
Facility: CLINIC | Age: 69
End: 2024-10-23
Payer: MEDICARE

## 2024-10-23 DIAGNOSIS — L82.0 INFLAMED SEBORRHEIC KERATOSIS: ICD-10-CM

## 2024-10-23 DIAGNOSIS — D22.9 FIBROUS PAPULE OF SKIN: ICD-10-CM

## 2024-10-23 DIAGNOSIS — Z12.83 SCREENING FOR SKIN CANCER: ICD-10-CM

## 2024-10-23 DIAGNOSIS — L73.8 SEBACEOUS HYPERPLASIA: ICD-10-CM

## 2024-10-23 DIAGNOSIS — L57.8 ACTINIC SKIN DAMAGE: Primary | ICD-10-CM

## 2024-10-23 DIAGNOSIS — L57.0 AK (ACTINIC KERATOSIS): ICD-10-CM

## 2024-10-23 DIAGNOSIS — D48.5 NEOPLASM OF UNCERTAIN BEHAVIOR OF SKIN: ICD-10-CM

## 2024-10-23 DIAGNOSIS — L82.1 SK (SEBORRHEIC KERATOSIS): ICD-10-CM

## 2024-10-23 DIAGNOSIS — D22.9 MULTIPLE BENIGN NEVI: ICD-10-CM

## 2024-10-23 DIAGNOSIS — D84.9 IMMUNOCOMPROMISED STATE: ICD-10-CM

## 2024-10-23 DIAGNOSIS — L81.4 LENTIGINES: ICD-10-CM

## 2024-10-23 DIAGNOSIS — Z85.828 HISTORY OF NONMELANOMA SKIN CANCER: ICD-10-CM

## 2024-10-23 DIAGNOSIS — D18.01 CHERRY ANGIOMA: ICD-10-CM

## 2024-10-23 PROCEDURE — 17004 DESTROY PREMAL LESIONS 15/>: CPT | Mod: S$GLB,,, | Performed by: DERMATOLOGY

## 2024-10-23 PROCEDURE — 4010F ACE/ARB THERAPY RXD/TAKEN: CPT | Mod: CPTII,S$GLB,, | Performed by: DERMATOLOGY

## 2024-10-23 PROCEDURE — 3288F FALL RISK ASSESSMENT DOCD: CPT | Mod: CPTII,S$GLB,, | Performed by: DERMATOLOGY

## 2024-10-23 PROCEDURE — 1101F PT FALLS ASSESS-DOCD LE1/YR: CPT | Mod: CPTII,S$GLB,, | Performed by: DERMATOLOGY

## 2024-10-23 PROCEDURE — 11103 TANGNTL BX SKIN EA SEP/ADDL: CPT | Mod: S$GLB,,, | Performed by: DERMATOLOGY

## 2024-10-23 PROCEDURE — 1159F MED LIST DOCD IN RCRD: CPT | Mod: CPTII,S$GLB,, | Performed by: DERMATOLOGY

## 2024-10-23 PROCEDURE — 11102 TANGNTL BX SKIN SINGLE LES: CPT | Mod: XS,S$GLB,, | Performed by: DERMATOLOGY

## 2024-10-23 PROCEDURE — 99999 PR PBB SHADOW E&M-EST. PATIENT-LVL III: CPT | Mod: PBBFAC,,, | Performed by: DERMATOLOGY

## 2024-10-23 PROCEDURE — 17110 DESTRUCTION B9 LES UP TO 14: CPT | Mod: XS,S$GLB,, | Performed by: DERMATOLOGY

## 2024-10-23 PROCEDURE — 99213 OFFICE O/P EST LOW 20 MIN: CPT | Mod: 25,S$GLB,, | Performed by: DERMATOLOGY

## 2024-10-23 RX ORDER — TACROLIMUS 1 MG/1
1 CAPSULE ORAL 2 TIMES DAILY
COMMUNITY
Start: 2024-10-02 | End: 2025-10-02

## 2024-10-23 NOTE — PROGRESS NOTES
"  Subjective:       Patient ID:  Raymundo Restrepo is a 69 y.o. male who presents for   Chief Complaint   Patient presents with    Skin Check     6m f/u skin check     HPI  Established patient.  Here today for total body skin exam.    Immunosuppressed: hx kidney transplant 2010, maintained on Prograf and Cellcept.  Hx of NMSC as below, AK.   C/o possibly recurrent lesion at R preauricular.     Most recent bx:   6/2024  1. Skin, right preauricular, shave biopsy:   - HYPERTROPHIC ACTINIC KERATOSIS  2. Skin, Right superior calf,  shave biopsy:   - COMPOUND NEVUS     Derm Hx:    SCCIS at R neck s/p Mohs 3/2024  SCCIS at L hand s/p Mohs 3/2024  SCCIS at L ear s/p Efudex 4 week course in 11-12/2022; recurrent on bx 3/2023 s/p Mohs with SSC 6/2023   SCC, moderately differentiated, at L upper lip s/p Mohs 12/2022   BCC at L preauricular s/p Mohs 12/2022  Persistent "crateriform squamoproliferative lesion" at R preauricular [superior] s/p WLE with ENT 12/2022 (neg margins); prior failure with multiple LN2, Efudex courses  SCCIS (focal) arising within HAK at vertex scalp s/p shave bx 7/2022, EDC and subsequent Efudex course 8/2022  SCCIS at L dorsal hand s/p EDC 8/2023  SCCIS at R preauricular cheek (inferior) s/p Mohs 2018  SCCIS at L neck s/p EDC 2017, Efudex course 2019  SCCIS at L dorsal wrist s/p EDC 2017  SCCIS at R posterior neck s/p shave bx 2017     Of note, previously discussed sirolimus with transplant MD but decided against 2/2 expected SE.     Review of Systems   Skin:  Positive for activity-related sunscreen use. Negative for daily sunscreen use and wears hat.   Hematologic/Lymphatic: Bruises/bleeds easily.        Objective:    Physical Exam   Constitutional: He appears well-developed and well-nourished.   Neurological: He is alert and oriented to person, place, and time.   Psychiatric: He has a normal mood and affect.   Skin:   Areas Examined (abnormalities noted in diagram):   Scalp / Hair Palpated and " Inspected  Head / Face Inspection Performed  Neck Inspection Performed  Chest / Axilla Inspection Performed  Abdomen Inspection Performed  Genitals / Buttocks / Groin Inspection Performed  Back Inspection Performed  RUE Inspected  LUE Inspection Performed  RLE Inspected  LLE Inspection Performed  Nails and Digits Inspection Performed                           Diagram Legend     Erythematous scaling macule/papule c/w actinic keratosis       Vascular papule c/w angioma      Pigmented verrucoid papule/plaque c/w seborrheic keratosis      Yellow umbilicated papule c/w sebaceous hyperplasia      Irregularly shaped tan macule c/w lentigo     1-2 mm smooth white papules consistent with Milia      Movable subcutaneous cyst with punctum c/w epidermal inclusion cyst      Subcutaneous movable cyst c/w pilar cyst      Firm pink to brown papule c/w dermatofibroma      Pedunculated fleshy papule(s) c/w skin tag(s)      Evenly pigmented macule c/w junctional nevus     Mildly variegated pigmented, slightly irregular-bordered macule c/w mildly atypical nevus      Flesh colored to evenly pigmented papule c/w intradermal nevus       Pink pearly papule/plaque c/w basal cell carcinoma      Erythematous hyperkeratotic cursted plaque c/w SCC      Surgical scar with no sign of skin cancer recurrence      Open and closed comedones      Inflammatory papules and pustules      Verrucoid papule consistent consistent with wart     Erythematous eczematous patches and plaques     Dystrophic onycholytic nail with subungual debris c/w onychomycosis     Umbilicated papule    Erythematous-base heme-crusted tan verrucoid plaque consistent with inflamed seborrheic keratosis     Erythematous Silvery Scaling Plaque c/w Psoriasis     See annotation              Assessment / Plan:      Pathology Orders:       Normal Orders This Visit    Specimen to Pathology, Dermatology     Comments:    Number of  Specimens:->2  ------------------------->-------------------------  Spec 1 Procedure:->Biopsy  Spec 1 Clinical Impression:->hak r/o scc  Spec 1 Source:->right dorsal forearm  ------------------------->-------------------------  Spec 2 Procedure:->Biopsy  Spec 2 Clinical Impression:->r/o sccis vs isk  Spec 2 Source:->right ventral forearm  Release to patient->Immediate    Questions:    Procedure Type: Dermatology and skin neoplasms    Number of Specimens: 2    ------------------------: -------------------------    Spec 1 Procedure: Biopsy    Spec 1 Clinical Impression: hak r/o scc    Spec 1 Source: right dorsal forearm    ------------------------: -------------------------    Spec 2 Procedure: Biopsy    Spec 2 Clinical Impression: r/o sccis vs isk    Spec 2 Source: right ventral forearm    Release to patient: Immediate            NUB  - Discussed diagnosis with patient and explained uncertain nature of condition, including ddx.   - Discussed treatment options (biopsy, close monitoring) with patient, including the risks and benefits of each. Patient opted to pursue biopsy.  - Shave Biopsy Procedure Note: Discussed procedure with patient/patient's guardian including risks and benefits as well as treatment alternatives. Risks of procedure include pain, bleeding, infection, post-inflammatory pigmentary alteration, scar, recurrence. Patient informed that the purpose of a biopsy is sampling of condition in question rather than removal in entirety; further treatment may be necessary. Verbal consent obtained. Area to be biopsied marked and cleansed with alcohol. Local anesthesia achieved by injecting approximately 1 cc of 1% lidocaine with epinephrine. Two shave biopsies performed using a double edge razor blade; specimens submitted to pathology. Hemostasis achieved with aluminum chloride. LN2 to base and residual. Petroleum jelly and bandage applied to wounds. Patient tolerated procedures well. After-visit wound care  instructions reviewed and provided in writing.      AK  - Discussed diagnosis, etiology, and precancerous nature of condition.   - Cryosurgery Procedure Note: Discussed procedure with patient/patient's guardian including risks and benefits as well as treatment alternatives. Risks of procedure include pain, itching, swelling, redness, blistering, crusting, wound formation, post-inflammatory pigmentary alteration, scar, recurrence. Verbal consent obtained. LN2 cryosurgery performed to 15+ lesion(s). Patient tolerated procedure well. After-visit wound care instructions reviewed and provided in writing.      Actinic skin damage  Rec PDT to face. Order placed. Chart routed to SK team for scheduling.     ISK  - Discussed diagnosis, etiology, and treatment options.   - Cryosurgery Procedure Note: Discussed procedure with patient/patient's guardian including risks and benefits as well as treatment alternatives. Risks of procedure include pain, itching, swelling, redness, blistering, crusting, wound formation, post-inflammatory pigmentary alteration, scar, recurrence. Verbal consent obtained. LN2 cryosurgery performed to 1 lesion(s). Patient tolerated procedure well. After-visit wound care instructions reviewed and provided in writing.     Multiple benign nevi  - Discussed diagnosis, etiology, and benign-nature of condition.  - Reassured; no lesions suspicious for malignancy noted on exam today.   - Recommended routine self examination of skin. Discussed the ABCDEs of melanoma and ugly duckling sign.   - Recommended daily sun protection, including the use of OTC broad-spectrum sunscreen (SPF 30 or greater) and sun-protective clothing.      Lentigines  - Benign; reassured treatment not necessary.   - Recommended daily sun protection, including the use of OTC broad-spectrum sunscreen (SPF 30 or greater) and sun-protective clothing.       SK (seborrheic keratosis)  Cherry angioma  Sebaceous hyperplasia  - Benign; reassured  treatment not necessary.      History of nonmelanoma skin cancer  Immunocompromised state  Screening for skin cancer  - Total body skin examination performed today.  - Findings listed above.   - Sites of prior malignancy examined - no concern for recurrence today.  - Discussed increased risk of skin cancer with immunosuppression.   - Recommended routine self examination of skin.    - Recommended daily sun protection, including the use of OTC broad-spectrum sunscreen (SPF 30 or greater) and sun-protective clothing.           Follow up for pending pathology.

## 2024-10-29 ENCOUNTER — PATIENT MESSAGE (OUTPATIENT)
Facility: CLINIC | Age: 69
End: 2024-10-29
Payer: MEDICARE

## 2024-10-29 ENCOUNTER — TELEPHONE (OUTPATIENT)
Facility: CLINIC | Age: 69
End: 2024-10-29
Payer: MEDICARE

## 2024-11-07 ENCOUNTER — OFFICE VISIT (OUTPATIENT)
Facility: CLINIC | Age: 69
End: 2024-11-07
Payer: MEDICARE

## 2024-11-07 DIAGNOSIS — D04.61 SQUAMOUS CELL CARCINOMA IN SITU (SCCIS) OF SKIN OF RIGHT FOREARM: Primary | ICD-10-CM

## 2024-11-07 DIAGNOSIS — C44.612 BASAL CELL CARCINOMA (BCC) OF SKIN OF RIGHT WRIST: ICD-10-CM

## 2024-11-07 PROCEDURE — 99999 PR PBB SHADOW E&M-EST. PATIENT-LVL II: CPT | Mod: PBBFAC,,, | Performed by: DERMATOLOGY

## 2024-11-07 PROCEDURE — 1101F PT FALLS ASSESS-DOCD LE1/YR: CPT | Mod: CPTII,S$GLB,, | Performed by: DERMATOLOGY

## 2024-11-07 PROCEDURE — 1159F MED LIST DOCD IN RCRD: CPT | Mod: CPTII,S$GLB,, | Performed by: DERMATOLOGY

## 2024-11-07 PROCEDURE — 4010F ACE/ARB THERAPY RXD/TAKEN: CPT | Mod: CPTII,S$GLB,, | Performed by: DERMATOLOGY

## 2024-11-07 PROCEDURE — 99212 OFFICE O/P EST SF 10 MIN: CPT | Mod: 25,S$GLB,, | Performed by: DERMATOLOGY

## 2024-11-07 PROCEDURE — 17262 DSTRJ MAL LES T/A/L 1.1-2.0: CPT | Mod: S$GLB,,, | Performed by: DERMATOLOGY

## 2024-11-07 PROCEDURE — 3288F FALL RISK ASSESSMENT DOCD: CPT | Mod: CPTII,S$GLB,, | Performed by: DERMATOLOGY

## 2024-11-07 RX ORDER — MUPIROCIN 20 MG/G
OINTMENT TOPICAL 3 TIMES DAILY
Qty: 22 G | Refills: 1 | Status: SHIPPED | OUTPATIENT
Start: 2024-11-07 | End: 2024-11-14

## 2024-11-07 NOTE — PROGRESS NOTES
1. Skin, right dorsal forearm, shave biopsy:  - BASAL CELL CARCINOMA WITH MIXED SUPERFICIAL AND NODULAR GROWTH PATTERN.  - THE TUMOR EXTENDS TO THE DEEP AND LATERAL BIOPSY MARGINS.    2. Skin, right ventral forearm, shave biopsy:  - SQUAMOUS CELL CARCINOMA IN SITU/ BOWEN'S DISEASE.  - THE TUMOR EXTENDS TO A PERIPHERAL BIOPSY MARGIN.     Basal cell carcinoma (BCC) of skin of right wrist  -     mupirocin (BACTROBAN) 2 % ointment; Apply topically 3 (three) times daily. for 7 days  Dispense: 22 g; Refill: 1  - Discussed malignant diagnosis with patient and need for definitive treatment.   - Discussed treatment options with patient, including the risks and benefits of each. Patient opted for EDC.   - Electrodessication and Curettage Procedure Note: Discussed procedure with patient/patient's guardian including risks and benefits as well as treatment alternatives. Risks of procedure include pain, bleeding, infection, post-inflammatory pigmentary alteration, scar, recurrence. Verbal consent obtained. Area to be treated marked and cleansed with alcohol. Local anesthesia achieved by injection of 1% lidocaine with epinephrine. Curettage and desiccation x 3 performed. Lesion size after primary curettage: 1.1 cm. Petroleum jelly and bandage applied to wound. Patient tolerated procedure well. After-visit wound care instructions reviewed and provided in writing. F/u 3 months, PRN sooner.     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   Given past failure of topical chemotherapy and immunocompromised status, will plan for excision with SK. Chart routed to team for scheduling.

## 2024-12-02 ENCOUNTER — OFFICE VISIT (OUTPATIENT)
Dept: DERMATOLOGY | Facility: CLINIC | Age: 69
End: 2024-12-02
Payer: MEDICARE

## 2024-12-02 ENCOUNTER — PROCEDURE VISIT (OUTPATIENT)
Dept: DERMATOLOGY | Facility: CLINIC | Age: 69
End: 2024-12-02
Payer: MEDICARE

## 2024-12-02 VITALS — SYSTOLIC BLOOD PRESSURE: 132 MMHG | DIASTOLIC BLOOD PRESSURE: 65 MMHG

## 2024-12-02 DIAGNOSIS — D04.61 SQUAMOUS CELL CARCINOMA IN SITU (SCCIS) OF SKIN OF RIGHT FOREARM: Primary | ICD-10-CM

## 2024-12-02 DIAGNOSIS — D04.61: Primary | ICD-10-CM

## 2024-12-02 PROCEDURE — 13121 CMPLX RPR S/A/L 2.6-7.5 CM: CPT | Mod: S$GLB,,, | Performed by: DERMATOLOGY

## 2024-12-02 PROCEDURE — 88305 TISSUE EXAM BY PATHOLOGIST: CPT | Mod: PO | Performed by: PATHOLOGY

## 2024-12-02 PROCEDURE — 88305 TISSUE EXAM BY PATHOLOGIST: CPT | Mod: 26,,, | Performed by: PATHOLOGY

## 2024-12-02 PROCEDURE — 11603 EXC TR-EXT MAL+MARG 2.1-3 CM: CPT | Mod: 51,S$GLB,, | Performed by: DERMATOLOGY

## 2024-12-02 PROCEDURE — 99499 UNLISTED E&M SERVICE: CPT | Mod: S$GLB,,, | Performed by: DERMATOLOGY

## 2024-12-02 PROCEDURE — 13122 CMPLX RPR S/A/L ADDL 5 CM/>: CPT | Mod: S$GLB,,, | Performed by: DERMATOLOGY

## 2024-12-02 NOTE — PROGRESS NOTES
EXCISION WITH LINEAR CLOSURE OPERATIVE NOTE  Name: Raymundo Restrepo   Patient : 1955  Date of Service: 2024  Attending Surgeon: Reynold Alejo MD FAAD  Assistants: Radha Rios - Surgical Technician  Anesthetic Agent: 1% lidocaine with 1:100,000 epinephrine  Clinical Diagnosis: squamous cell carcinoma in situ  Operations: Excision of malignant lesion.            complex linear repair  Location: right ventral forearm  Surgical Preparation: povidone-iodine    Description of Operation:  The nature and purpose of the procedure, associated risks and alternative treatments were explained to the patient in detail. All patient questions were answered completely. An informed operative consent and photography permit were obtained. The tumor location was then identified and marked with agreement by the patient of the correct location. The patient was positioned, prepped, and draped in the usual sterile manner. Local anesthesia was obtained with 12 cc(s) of 1% lidocaine with 1:100,000 epinephrine. The lesion pre-operatively measures 1.7 by 1.3 cm.    A 4+ mm rim of normal appearing skin was marked circumferentially around the lesion after scraping with a curette to define the margin. The area thus outlined was excised at a 90 degree angle. Hemostasis was obtained with electrodesiccation. This resulted in a final defect measuring 5.6 x 2.2 cm. The specimen was placed in formalin and sent for pathology.     The defect edges were debeveled with a #15 scalpel blade. The defect was widely undermined in the deep subcutaneous plane at least 100% of the defect diameter to allow for maximum tissue movement. Hemostasis was achieved with spot electrodessication. The defect was closed first utilizing multiple 4-0 Vicryl interrupted buried subcutaneous sutures. This resulted in excellent apposition of the dermis and epidermis, however two redundant areas of tissue were created on opposite sides of the defect. Utilizing a  #15 scalpel blade, two Burows triangles were excised to ensure a flat scar. Hemostasis was obtained with spot electrodessication. The skin edges throughout were further fastened superficially with 4-0 Nylon. The final length of the repair was 7.7 cm.  The patient tolerated the procedure well and there were no complications.  Polysporin, non-adherent gauze and a pressure dressing were applied to wound after gentle cleansing with saline.    Patient instructed in and provided with instructions for post-op care, will RTC in 10 days for suture removal  Post op meds: None    Photos:         Reynold Alejo MD FAAD

## 2024-12-02 NOTE — PROGRESS NOTES
Dermatology Outpatient Clinic Progress Note    Patient Name: Raymundo Restrepo  Patient : 1955  MRN: [id number]  Date of Service: 2024    CC/HPI: Raymundo Restrepo is a 69 y.o. year old male with history of basal cell carcinoma and squamous cell carcinoma who presents today for consult excision v other scc right dorsal forearm.  Patient reports biopsy was done and lesion is growing back. Patient is not sure why he was scheduled for a consult, would like excision done today if possible as he lives [place] away.     ROS:   Dermatological ROS: positive for skin lesion changes    Physical Exam   Upper extremities   Upper extremities comments: Right ventral forearm SCCIS still visible                Diagram Legend     Erythematous scaling macule/papule c/w actinic keratosis       Vascular papule c/w angioma      Pigmented verrucoid papule/plaque c/w seborrheic keratosis      Yellow umbilicated papule c/w sebaceous hyperplasia      Irregularly shaped tan macule c/w lentigo     1-2 mm smooth white papules consistent with Milia      Movable subcutaneous cyst with punctum c/w epidermal inclusion cyst      Subcutaneous movable cyst c/w pilar cyst      Firm pink to brown papule c/w dermatofibroma      Pedunculated fleshy papule(s) c/w skin tag(s)      Evenly pigmented macule c/w junctional nevus     Mildly variegated pigmented, slightly irregular-bordered macule c/w mildly atypical nevus      Flesh colored to evenly pigmented papule c/w intradermal nevus       Pink pearly papule/plaque c/w basal cell carcinoma      Erythematous hyperkeratotic cursted plaque c/w SCC      Surgical scar with no sign of skin cancer recurrence      Open and closed comedones      Inflammatory papules and pustules      Verrucoid papule consistent consistent with wart     Erythematous eczematous patches and plaques     Dystrophic onycholytic nail with subungual debris c/w onychomycosis     Umbilicated papule    Erythematous-base heme-crusted tan  verrucoid plaque consistent with inflamed seborrheic keratosis     Erythematous Silvery Scaling Plaque c/w Psoriasis     See annotation    Assessment/Plan:    Diagnoses and all orders for this visit:    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      EXCISION WITH LINEAR CLOSURE OPERATIVE NOTE  Name: Raymundo Restrepo   Patient : 1955  Date of Service: 2024  Attending Surgeon: Reynold Alejo MD FAAD  Assistants: Radha Rios - Surgical Technician  Anesthetic Agent: 1% lidocaine with 1:100,000 epinephrine  Clinical Diagnosis: squamous cell carcinoma in situ  Operations: Excision of  malignant lesion;  complex linear repair  Location: right ventral forearm  Surgical Preparation: povidone-iodine    Description of Operation:  The nature and purpose of the procedure, associated risks and alternative treatments were explained to the patient in detail. All patient questions were answered completely. An informed operative consent and photography permit were obtained. The tumor location was then identified and marked with agreement by the patient of the correct location. The patient was positioned, prepped, and draped in the usual sterile manner. Local anesthesia was obtained with 12 cc(s) of 1% lidocaine with 1:100,000 epinephrine. The lesion pre-operatively measures 1.7 by 1.3 cm.    A 4+ mm rim of normal appearing skin was marked circumferentially around the lesion after scraping with a curette to define the margin. The area thus outlined was excised at a 90 degree angle. Hemostasis was obtained with electrodesiccation. This resulted in a final defect measuring 5.6 x 2.2 cm. The specimen was placed in formalin and sent for pathology.     The defect edges were debeveled with a #15 scalpel blade. The defect was widely undermined in the deep subcutaneous plane at least 100% of the defect diameter to allow for maximum tissue movement. Hemostasis was achieved with spot electrodessication.  The defect was closed first utilizing multiple 4-0 Vicryl interrupted buried subcutaneous sutures. This resulted in excellent apposition of the dermis and epidermis, however two redundant areas of tissue were created on opposite sides of the defect. Utilizing a #15 scalpel blade, two Burows triangles were excised to ensure a flat scar. Hemostasis was obtained with spot electrodessication. The skin edges throughout were further fastened superficially with 4-0 Nylon. The final length of the repair was 7.7 cm.  The patient tolerated the procedure well and there were no complications.  Polysporin, non-adherent gauze and a pressure dressing were applied to wound after gentle cleansing with saline.    Patient instructed in and provided with instructions for post-op care, will RTC in 10 days for suture removal  Post op meds: None    Photos:        MD LOGAN Cai

## 2024-12-05 ENCOUNTER — TELEPHONE (OUTPATIENT)
Dept: DERMATOLOGY | Facility: CLINIC | Age: 69
End: 2024-12-05
Payer: MEDICARE

## 2024-12-05 LAB
FINAL PATHOLOGIC DIAGNOSIS: NORMAL
GROSS: NORMAL
Lab: NORMAL
MICROSCOPIC EXAM: NORMAL

## 2024-12-12 ENCOUNTER — CLINICAL SUPPORT (OUTPATIENT)
Dept: DERMATOLOGY | Facility: CLINIC | Age: 69
End: 2024-12-12
Payer: MEDICARE

## 2024-12-12 DIAGNOSIS — Z48.02 VISIT FOR SUTURE REMOVAL: Primary | ICD-10-CM

## 2024-12-12 PROCEDURE — 99024 POSTOP FOLLOW-UP VISIT: CPT | Mod: S$GLB,,, | Performed by: DERMATOLOGY

## 2024-12-18 NOTE — PROGRESS NOTES
Mohs Surgery Suture Removal Note   Patient Name: Raymundo Restrepo  Patient : 1955  Date of Service: 2025    CC: Pt. Presents for removal of sutures on the right arm today  Subjective: patient reports wound healing as expected     Objective: Wound well healed, sutures clean/dry/intact. No evidence of any complications noted.       Visit For Suture Removal:  - Suture removal today  - Steri strips/mastisol were not applied  - Patient counseled on silicone gel/silicone sheeting and their use in the management of post-operative scars  - Handout on silicone gel/silicone gel sheeting was provided  - Patient to follow up with their referring provider in 3 months for further skin evaluation    Reynold Alejo

## 2025-02-26 ENCOUNTER — OFFICE VISIT (OUTPATIENT)
Facility: CLINIC | Age: 70
End: 2025-02-26
Payer: MEDICARE

## 2025-02-26 DIAGNOSIS — L82.1 SK (SEBORRHEIC KERATOSIS): ICD-10-CM

## 2025-02-26 DIAGNOSIS — L73.8 SEBACEOUS HYPERPLASIA: ICD-10-CM

## 2025-02-26 DIAGNOSIS — L81.4 LENTIGINES: ICD-10-CM

## 2025-02-26 DIAGNOSIS — Z12.83 SCREENING FOR SKIN CANCER: ICD-10-CM

## 2025-02-26 DIAGNOSIS — D18.01 CHERRY ANGIOMA: ICD-10-CM

## 2025-02-26 DIAGNOSIS — D22.9 MULTIPLE BENIGN NEVI: Primary | ICD-10-CM

## 2025-02-26 DIAGNOSIS — L57.0 AK (ACTINIC KERATOSIS): ICD-10-CM

## 2025-02-26 DIAGNOSIS — D48.5 NEOPLASM OF UNCERTAIN BEHAVIOR OF SKIN: ICD-10-CM

## 2025-02-26 DIAGNOSIS — Z85.828 HISTORY OF NONMELANOMA SKIN CANCER: ICD-10-CM

## 2025-02-26 PROCEDURE — 17004 DESTROY PREMAL LESIONS 15/>: CPT | Mod: S$GLB,,, | Performed by: DERMATOLOGY

## 2025-02-26 PROCEDURE — 99999 PR PBB SHADOW E&M-EST. PATIENT-LVL III: CPT | Mod: PBBFAC,,, | Performed by: DERMATOLOGY

## 2025-02-26 PROCEDURE — 11102 TANGNTL BX SKIN SINGLE LES: CPT | Mod: XS,S$GLB,, | Performed by: DERMATOLOGY

## 2025-02-26 PROCEDURE — 11103 TANGNTL BX SKIN EA SEP/ADDL: CPT | Mod: S$GLB,,, | Performed by: DERMATOLOGY

## 2025-02-26 PROCEDURE — 99213 OFFICE O/P EST LOW 20 MIN: CPT | Mod: 25,S$GLB,, | Performed by: DERMATOLOGY

## 2025-02-26 PROCEDURE — 3288F FALL RISK ASSESSMENT DOCD: CPT | Mod: CPTII,S$GLB,, | Performed by: DERMATOLOGY

## 2025-02-26 PROCEDURE — 4010F ACE/ARB THERAPY RXD/TAKEN: CPT | Mod: CPTII,S$GLB,, | Performed by: DERMATOLOGY

## 2025-02-26 PROCEDURE — 1101F PT FALLS ASSESS-DOCD LE1/YR: CPT | Mod: CPTII,S$GLB,, | Performed by: DERMATOLOGY

## 2025-02-26 PROCEDURE — 1159F MED LIST DOCD IN RCRD: CPT | Mod: CPTII,S$GLB,, | Performed by: DERMATOLOGY

## 2025-02-26 NOTE — PROGRESS NOTES
"  Subjective:       Patient ID:  Raymundo Restrepo is a 70 y.o. male who presents for   Chief Complaint   Patient presents with    Skin Check     UBSC     HPI  Established patient.  Here today for upper body skin exam.  Last TBSE 10/2024.   Immunosuppressed: hx kidney transplant 2010, maintained on Prograf and Cellcept.    10/2024  1. Skin, right dorsal forearm, shave biopsy:  - BASAL CELL CARCINOMA WITH MIXED SUPERFICIAL AND NODULAR GROWTH PATTERN.  - THE TUMOR EXTENDS TO THE DEEP AND LATERAL BIOPSY MARGINS.  2. Skin, right ventral forearm, shave biopsy:  - SQUAMOUS CELL CARCINOMA IN SITU/ BOWEN'S DISEASE.  - THE TUMOR EXTENDS TO A PERIPHERAL BIOPSY MARGIN.    Derm Hx:    SCCIS at R ventral forearm s/p excision 12/2024  BCC at R dorsal forearm s/p EDC 11/2024  SCCIS at R neck s/p Mohs 3/2024  SCCIS at L hand s/p Mohs 3/2024  SCCIS at L ear s/p Efudex 4 week course in 11-12/2022; recurrent on bx 3/2023 s/p Mohs with SSC 6/2023   SCC, moderately differentiated, at L upper lip s/p Mohs 12/2022   BCC at L preauricular s/p Mohs 12/2022  Persistent "crateriform squamoproliferative lesion" at R preauricular [superior] s/p WLE with ENT 12/2022 (neg margins); prior failure with multiple LN2, Efudex courses  SCCIS (focal) arising within HAK at vertex scalp s/p shave bx 7/2022, EDC and subsequent Efudex course 8/2022  SCCIS at L dorsal hand s/p EDC 8/2023  SCCIS at R preauricular cheek (inferior) s/p Mohs 2018  SCCIS at L neck s/p EDC 2017, Efudex course 2019  SCCIS at L dorsal wrist s/p EDC 2017  SCCIS at R posterior neck s/p shave bx 2017     Of note, previously discussed sirolimus with transplant MD but decided against 2/2 expected SE.     Review of Systems   Skin:  Positive for activity-related sunscreen use. Negative for daily sunscreen use and wears hat.   Hematologic/Lymphatic: Bruises/bleeds easily.        Objective:    Physical Exam   Constitutional: He appears well-developed and well-nourished.   Neurological: He is " alert and oriented to person, place, and time.   Psychiatric: He has a normal mood and affect.   Skin:   Areas Examined (abnormalities noted in diagram):   Scalp / Hair Palpated and Inspected  Head / Face Inspection Performed  Neck Inspection Performed  Chest / Axilla Inspection Performed  Abdomen Inspection Performed  Back Inspection Performed  RUE Inspected  LUE Inspection Performed  Nails and Digits Inspection Performed                       Diagram Legend     Erythematous scaling macule/papule c/w actinic keratosis       Vascular papule c/w angioma      Pigmented verrucoid papule/plaque c/w seborrheic keratosis      Yellow umbilicated papule c/w sebaceous hyperplasia      Irregularly shaped tan macule c/w lentigo     1-2 mm smooth white papules consistent with Milia      Movable subcutaneous cyst with punctum c/w epidermal inclusion cyst      Subcutaneous movable cyst c/w pilar cyst      Firm pink to brown papule c/w dermatofibroma      Pedunculated fleshy papule(s) c/w skin tag(s)      Evenly pigmented macule c/w junctional nevus     Mildly variegated pigmented, slightly irregular-bordered macule c/w mildly atypical nevus      Flesh colored to evenly pigmented papule c/w intradermal nevus       Pink pearly papule/plaque c/w basal cell carcinoma      Erythematous hyperkeratotic cursted plaque c/w SCC      Surgical scar with no sign of skin cancer recurrence      Open and closed comedones      Inflammatory papules and pustules      Verrucoid papule consistent consistent with wart     Erythematous eczematous patches and plaques     Dystrophic onycholytic nail with subungual debris c/w onychomycosis     Umbilicated papule    Erythematous-base heme-crusted tan verrucoid plaque consistent with inflamed seborrheic keratosis     Erythematous Silvery Scaling Plaque c/w Psoriasis     See annotation            Assessment / Plan:      Pathology Orders:       Normal Orders This Visit    Specimen to Pathology, Dermatology      Comments:    Number of Specimens:->2  ------------------------->-------------------------  Spec 1 Procedure:->Biopsy  Spec 1 Clinical Impression:->r/o bcc  Spec 1 Source:->right nasal ala  ------------------------->-------------------------  Spec 2 Procedure:->Biopsy  Spec 2 Clinical Impression:->r/o sccis  Spec 2 Source:->left proximal dorsal forearm  Release to patient->Immediate    Questions:    Procedure Type: Dermatology and skin neoplasms    Number of Specimens: 2    ------------------------: -------------------------    Spec 1 Procedure: Biopsy    Spec 1 Clinical Impression: r/o bcc    Spec 1 Source: right nasal ala    ------------------------: -------------------------    Spec 2 Procedure: Biopsy    Spec 2 Clinical Impression: r/o sccis    Spec 2 Source: left proximal dorsal forearm    Release to patient: Immediate          NUB  - Discussed diagnosis with patient and explained uncertain nature of condition, including ddx.   - Discussed treatment options (biopsy, close monitoring) with patient, including the risks and benefits of each. Patient opted to pursue biopsy.  - Shave Biopsy Procedure Note: Discussed procedure with patient/patient's guardian including risks and benefits as well as treatment alternatives. Risks of procedure include pain, bleeding, infection, post-inflammatory pigmentary alteration, scar, recurrence. Patient informed that the purpose of a biopsy is sampling of condition in question rather than removal in entirety; further treatment may be necessary. Verbal consent obtained. Area to be biopsied marked and cleansed with alcohol. Local anesthesia achieved by injecting approximately 1 cc of 1% lidocaine with epinephrine. Two shave biopsies performed using a double edge razor blade; specimens submitted to pathology. Hemostasis achieved with aluminum chloride. Petroleum jelly and bandage applied to wounds. Patient tolerated procedures well. After-visit wound care instructions reviewed and  provided in writing.      AK  - Discussed diagnosis, etiology, and precancerous nature of condition.   - Cryosurgery Procedure Note: Discussed procedure with patient/patient's guardian including risks and benefits as well as treatment alternatives. Risks of procedure include pain, itching, swelling, redness, blistering, crusting, wound formation, post-inflammatory pigmentary alteration, scar, recurrence. Verbal consent obtained. LN2 cryosurgery performed to 15+ lesion(s). Patient tolerated procedure well. After-visit wound care instructions reviewed and provided in writing.      Actinic skin damage  Rec PDT to face. Previously ordered but patient cancelled.      Multiple benign nevi  - Discussed diagnosis, etiology, and benign-nature of condition.  - Reassured; no lesions suspicious for malignancy noted on exam today.   - Recommended routine self examination of skin. Discussed the ABCDEs of melanoma and ugly duckling sign.   - Recommended daily sun protection, including the use of OTC broad-spectrum sunscreen (SPF 30 or greater) and sun-protective clothing.      Lentigines  - Benign; reassured treatment not necessary.   - Recommended daily sun protection, including the use of OTC broad-spectrum sunscreen (SPF 30 or greater) and sun-protective clothing.       SK (seborrheic keratosis)  Cherry angioma  Sebaceous hyperplasia  Fibrous papule  - Benign; reassured treatment not necessary.      History of nonmelanoma skin cancer  Immunocompromised state  Screening for skin cancer  - Upper body skin examination performed today.  - Findings listed above.   - Sites of prior malignancy examined - no concern for recurrence today.  - Discussed increased risk of skin cancer with immunosuppression.   - Recommended routine self examination of skin.    - Recommended daily sun protection, including the use of OTC broad-spectrum sunscreen (SPF 30 or greater) and sun-protective clothing.         Follow up for pending pathology.

## 2025-03-10 ENCOUNTER — RESULTS FOLLOW-UP (OUTPATIENT)
Facility: CLINIC | Age: 70
End: 2025-03-10
Payer: MEDICARE

## 2025-03-10 NOTE — PROGRESS NOTES
1. Skin, right nasal ala, shave biopsy:   -BASAL CELL CARCINOMA, NODULAR AND INFILTRATIVE, EXTENDING TO THE PERIPHERAL AND DEEP BIOPSY EDGES   2. Skin, left proximal dorsal forearm, shave biopsy:   -SQUAMOUS CELL CARCINOMA IN-SITU, EXTENDING TO A PERIPHERAL BIOPSY EDGE     Please call to discuss results / plan / schedule:   1) BCC confirmed. Rec Mohs for definitive treatment. Please forward result to Mohs team once patient is contacted re diagnosis and treatment recommendation.    2) Small SCCIS confirmed. Rec EDC treatment ; could consider Efudex course but failure in past.   Please schedule above, 3 month f/u.     
No

## 2025-03-26 ENCOUNTER — PATIENT MESSAGE (OUTPATIENT)
Facility: CLINIC | Age: 70
End: 2025-03-26
Payer: MEDICARE

## 2025-03-26 DIAGNOSIS — C44.311 BASAL CELL CARCINOMA OF RIGHT SIDE OF NOSE: Primary | ICD-10-CM

## 2025-04-07 ENCOUNTER — TELEPHONE (OUTPATIENT)
Dept: DERMATOLOGY | Facility: CLINIC | Age: 70
End: 2025-04-07
Payer: MEDICARE

## 2025-04-07 NOTE — TELEPHONE ENCOUNTER
"Spoke to patient regarding rescheduling mohs procedure on right nasal ala. Patient states that after the biopsy site healed that there was "nothing there" and he does not want to have surgery on something that is not there. He defers treatment and would like to follow up with Dr. Watters for re-eval of the right nasal ala.     ----- Message from YESICA Arguello sent at 4/3/2025 11:19 AM CDT -----  Regarding: mohs reschedule?  Dr. Watters just re-forwarded this to me.  It looks like he had mohs scheduled for 3/31 which shows as cancelled. Nothing populated as scheduled at this time.    ----- Message -----  From: Caroline Watters MD  Sent: 3/10/2025   5:50 AM CDT  To: [facility]ER[facility] Staff    1. Skin, right nasal ala, shave biopsy:   -BASAL CELL CARCINOMA, NODULAR AND INFILTRATIVE, EXTENDING TO THE PERIPHERAL AND DEEP BIOPSY EDGES   2. Skin, left proximal dorsal forearm, shave biopsy:   -SQUAMOUS CELL CARCINOMA IN-SITU, EXTENDING TO A PERIPHERAL BIOPSY EDGE     Please call to discuss results / plan / schedule:   1) BCC confirmed. Rec Mohs for definitive treatment. Please forward result to Mohs team once patient is contacted re diagnosis and treatment recommendation.    2) Small SCCIS confirmed. Rec EDC treatment ; could consider Efudex course but failure in past.   Please schedule above, 3 month f/u.     ----- Message -----  From: [employer], [employer]  Sent: 3/5/2025   2:34 PM CDT  To: Caroline Watters MD    "

## 2025-04-14 ENCOUNTER — TELEPHONE (OUTPATIENT)
Dept: DERMATOLOGY | Facility: CLINIC | Age: 70
End: 2025-04-14
Payer: MEDICARE

## 2025-05-20 ENCOUNTER — PROCEDURE VISIT (OUTPATIENT)
Dept: DERMATOLOGY | Facility: CLINIC | Age: 70
End: 2025-05-20
Payer: MEDICARE

## 2025-05-20 VITALS
BODY MASS INDEX: 28.74 KG/M2 | DIASTOLIC BLOOD PRESSURE: 69 MMHG | WEIGHT: 178.81 LBS | HEIGHT: 66 IN | HEART RATE: 66 BPM | SYSTOLIC BLOOD PRESSURE: 138 MMHG

## 2025-05-20 DIAGNOSIS — C44.311 BASAL CELL CARCINOMA (BCC) OF RIGHT SIDE OF NOSE: ICD-10-CM

## 2025-05-20 PROCEDURE — 17311 MOHS 1 STAGE H/N/HF/G: CPT | Mod: 51,S$GLB,, | Performed by: DERMATOLOGY

## 2025-05-20 PROCEDURE — 14060 TIS TRNFR E/N/E/L 10 SQ CM/<: CPT | Mod: S$GLB,,, | Performed by: DERMATOLOGY

## 2025-05-20 PROCEDURE — 17312 MOHS ADDL STAGE: CPT | Mod: S$GLB,,, | Performed by: DERMATOLOGY

## 2025-05-20 RX ORDER — DOXYCYCLINE 100 MG/1
100 CAPSULE ORAL 2 TIMES DAILY
Qty: 14 CAPSULE | Refills: 0 | Status: SHIPPED | OUTPATIENT
Start: 2025-05-20 | End: 2025-05-27

## 2025-05-20 NOTE — PROGRESS NOTES
MOHS MICROGRAPHIC SURGERY OPERATIVE NOTE  Name:  Raymundo Restrepo  Date: 2025  Patient : 1955  Attending Surgeon: Reynold Alejo MD  Assistants: Radha Rios - Surgical Technician  Anesthetic Agent: 1% lidocaine with 1:100,000 epinephrine  Clinical Diagnosis: basal cell carcinoma - nodular and infiltrative  Operation: Mohs Micrographic Surgery  Location: right nasal ala  Indications: Location in mask areas of face including central face, nose, eyelids, eyebrows, lips, chin, preauricular, temple, and ear.  Surgical Preparation: povidone-iodine  Pre-op anbitioics: no  MOHS Case #: [facility]      Description of Operation:  The nature and purpose of the procedure, associated risks and alternative treatments were explained to the patient in detail. All patient questions were answered completely.  An informed operative consent and photography permit were obtained. The tumor location was then identified and marked with agreement by the patient of the correct location.   Time out was performed with all present parties agreeing to two patient identifiers and the procedure location.  The patient was positioned, prepped, and draped in the usual sterile manner.  Local anesthesia was obtained with 0.5 cc(s) of 1% lidocaine with 1:100,000 epinephrine. The lesion pre-operatively measures 0.5 by 0.5 cm.     1ST STAGE:  A 2+ mm rim of normal appearing skin was marked circumferentially around the lesion after scraping with a curette to define the margin.   The area thus outlined was excised at a 45 degree angle. Hemostasis was obtained with electrodesiccation.   The specimen was oriented, mapped, and subdivided into at least two sections for evaluation.  Sections were then chromacoded and submitted for horizontal frozen sections.   The patient tolerated the procedure well and there were no complications.   Upon microscopic examination of the processed horizontal frozen sections of this stage:  Tumor was present at  the margin of the specimen; these areas of residual tumor were marked on the reference map with red pencil, pinpointing the location in which further tissue excision was necessary.    Tumor type noted on stage 1: Infiltrative basal cell carcinoma: Basaloid tumor in dermis characterized by thin elongated strands of basaloid cells infiltrating between dermal collagen bundles.     SUBSEQUENT STAGES:    The patient returned to the procedure room for additional Mohs stages and was prepped and draped in the sterile manner previously described.   The patient verbally confirmed their name and date of birth, which were cross-verified with the Mohs surgical map, procedure location, and the patient's wristband.   A surgical time-out was repeated, and all present parties confirmed the correct patient, surgical site, and Mohs map prior to proceeding.  Residual tumor was noted on prior histologic sections, and an additional 1-2 mm margin of tissue was excised from the corresponding areas as indicated on the map.   A new reference map was created to maintain precise anatomic orientation.  Hemostasis was achieved, and the tissue was properly labeled and processed for frozen section analysis.   The sections were examined by the Mohs surgeon, and areas of persistent tumor were marked on the reference map.  This process was repeated as needed. Final frozen sections from STAGE 2 demonstrated no residual tumor, and complete tumor extirpation was confirmed.  Tumor type identified in subsequent stages: Superficial basal cell carcinoma: Foci of basaloid cells with peripheral palisading and focal retraction artifact arising along the dermoepidermal junction and extending into the papillary dermis.       SUMMARY:  The tumor was extirpated in 3 Mohs Stages resulting in a final defect measuring 3.6 by 1.3 cm².   The final defect extended deep to perichondrium  The patient tolerated the procedure well and no complications were noted.     DEFECT  PHOTO:      Reynold Alejo MD      FLAP CLOSURE OF MOHS DEFECT OPERATIVE REPORT  Patient Name: Raymundo Restrepo  Patient YOB: 1955  Date of procedure: 5/20/2025  Attending Surgeon: Reynold Alejo MD FAAD  Anesthetic Agent: 1% lidocaine with 1:100,000 epinephrine   Assistant: Radha Rios - Surgical Technician  Clinical Diagnosis: A 0.5 x 0.5 cm² defect after Mohs Micrographic Surgery  Location: right nasal ala  Operation:  V to Y advancement flap (Myocutaneous pedicle advancement)   Surgical Preparation: povidone-iodine    Description of Operation:  The nature and purpose of the procedure, associated risks and alternative treatments were explained to the patient in detail. All patient questions were answered completely. In order to minimize tension on the closure and avoid compromising the anatomic contour of the anatomic region and maximize functional capacity, the above noted adjacent tissue transfer was performed.    An informed operative consent and photography permit were obtained. The patient was positioned, prepped, and draped in the usual sterile manner. Local anesthesia was obtained with 6 cc(s) of 1% lidocaine with 1:100,000 epinephrine The defect edges were debeveled with a #15 blade. Using gentian violet, the flap was designed adjacent to the defect including all anticipated dog ears. The area thus outlined was incised deep to perichondrium with the scalpel. This resulted in a final undermined and elevated flap defect measuring 3.6 x 1.3 cm².   The flap and skin margins were then undermined 50 to 75% of the defects diameter in all directions utilizing supercut iris scissors. Hemostasis was achieved with electrodessication.   The secondary defect was closed first utilizing 5-0 Monocryl interrupted buried subcutaneous vertical mattress sutures.     The adjacent tissue flap was then mobilized into place and anchored with additional 5-0 Monocryl interrupted buried subcutaneous  vertical mattress sutures. The flap and recipient sites were sculpted in the adipose and dermal planes for a good fit. The skin edges were then reapposed with 6-0 Prolene. Redundant tissue was noted at the inferior and superior aspect of the adjacent tissue transfer. Burows triangles were removed utilizing a #15 blade and the epidermal edges reapposed with 6-0 Prolene. This repair resulted in excellent contour with normal symmetry. The patient tolerated the procedure well and there were no complications.   Polysporin, non-adherent gauze and a pressure dressing were applied to wound after gentle cleansing with saline.    Post op meds: Doxycycline 100 BID x 7 days     Photos:      MD LOGAN Cai

## 2025-05-27 ENCOUNTER — CLINICAL SUPPORT (OUTPATIENT)
Dept: DERMATOLOGY | Facility: CLINIC | Age: 70
End: 2025-05-27
Payer: MEDICARE

## 2025-05-27 DIAGNOSIS — Z48.02 VISIT FOR SUTURE REMOVAL: Primary | ICD-10-CM

## 2025-05-27 PROCEDURE — 99999 PR PBB SHADOW E&M-EST. PATIENT-LVL III: CPT | Mod: PBBFAC,,,

## 2025-05-27 NOTE — PROGRESS NOTES
Suture Removal Note   Patient Name: Raymundo Restrepo  Patient : 1955  Date of Service: 2025    CC: Pt. Presents for removal of sutures on the right ala today  Subjective: patient reports wound healing as expected     Objective: Wound well healed, sutures clean/dry/intact. Can't breathe air in through the nostril.  Photo:       Visit For Suture Removal:  - Suture removal today  - Steri strips/mastisol were not applied  - Patient counseled on silicone gel/silicone sheeting and their use in the management of post-operative scars  - Handout on silicone gel/silicone gel sheeting was provided  - Patient to follow up with their referring provider in 3 months for further skin evaluation    Vanessa Ballard

## 2025-06-24 ENCOUNTER — OFFICE VISIT (OUTPATIENT)
Dept: DERMATOLOGY | Facility: CLINIC | Age: 70
End: 2025-06-24
Payer: MEDICARE

## 2025-06-24 DIAGNOSIS — L90.5 SCAR: Primary | ICD-10-CM

## 2025-06-24 PROCEDURE — 3288F FALL RISK ASSESSMENT DOCD: CPT | Mod: CPTII,S$GLB,, | Performed by: DERMATOLOGY

## 2025-06-24 PROCEDURE — 4010F ACE/ARB THERAPY RXD/TAKEN: CPT | Mod: CPTII,S$GLB,, | Performed by: DERMATOLOGY

## 2025-06-24 PROCEDURE — 99999 PR PBB SHADOW E&M-EST. PATIENT-LVL II: CPT | Mod: PBBFAC,,, | Performed by: DERMATOLOGY

## 2025-06-24 PROCEDURE — 99024 POSTOP FOLLOW-UP VISIT: CPT | Mod: S$GLB,,, | Performed by: DERMATOLOGY

## 2025-06-24 PROCEDURE — 1126F AMNT PAIN NOTED NONE PRSNT: CPT | Mod: CPTII,S$GLB,, | Performed by: DERMATOLOGY

## 2025-06-24 PROCEDURE — 1101F PT FALLS ASSESS-DOCD LE1/YR: CPT | Mod: CPTII,S$GLB,, | Performed by: DERMATOLOGY

## 2025-06-24 NOTE — PROGRESS NOTES
Dermatology Outpatient Clinic Progress Note    Patient Name: Raymundo Restrepo  Patient : 1955  MRN: [id number]  Date of Service: 2025    Subjective:      CC/HPI: Raymundo Restrepo is a 70 y.o. year old male with history of basal cell carcinoma who presents today for a 5 week follow up of right ala.  Patient reports knot inside nostril, tender, decreased sensation, and complains of decreased ability to breathe with due to edema.     ROS:   Dermatological ROS: positive for skin lesion changes      Objective:   Derm Physical Exam    Diagram Legend     Erythematous scaling macule/papule c/w actinic keratosis       Vascular papule c/w angioma      Pigmented verrucoid papule/plaque c/w seborrheic keratosis      Yellow umbilicated papule c/w sebaceous hyperplasia      Irregularly shaped tan macule c/w lentigo     1-2 mm smooth white papules consistent with Milia      Movable subcutaneous cyst with punctum c/w epidermal inclusion cyst      Subcutaneous movable cyst c/w pilar cyst      Firm pink to brown papule c/w dermatofibroma      Pedunculated fleshy papule(s) c/w skin tag(s)      Evenly pigmented macule c/w junctional nevus     Mildly variegated pigmented, slightly irregular-bordered macule c/w mildly atypical nevus      Flesh colored to evenly pigmented papule c/w intradermal nevus       Pink pearly papule/plaque c/w basal cell carcinoma      Erythematous hyperkeratotic cursted plaque c/w SCC      Surgical scar with no sign of skin cancer recurrence      Open and closed comedones      Inflammatory papules and pustules      Verrucoid papule consistent consistent with wart     Erythematous eczematous patches and plaques     Dystrophic onycholytic nail with subungual debris c/w onychomycosis     Umbilicated papule    Erythematous-base heme-crusted tan verrucoid plaque consistent with inflamed seborrheic keratosis     Erythematous Silvery Scaling Plaque c/w Psoriasis     See annotation      Assessment / Plan:         Scar    - flap well healed with excellent contours and shape of the ala  - discussed that internal nasal edema should improve with time and lead to improved breathing on the affected side  - discussed that the flap is still early and in the healing process and hence the associated edema is normal  - discussed flap/scar massage up to 10x/day to help accelerate resolution of internal mucosal edema    Follow up KAL Alejo MD FA[facility]

## (undated) DEVICE — NDL HYPO 27G X 1 1/2

## (undated) DEVICE — FORCEP STRAIGHT DISP

## (undated) DEVICE — ELECTRODE REM PLYHSV RETURN 9

## (undated) DEVICE — TUBING SUC UNIV W/CONN 12FT

## (undated) DEVICE — MARKER SKIN STND TIP BLUE BARR

## (undated) DEVICE — DRAPE EENT SPLIT STERILE

## (undated) DEVICE — CORD BIPOLAR 12 FOOT

## (undated) DEVICE — SEE MEDLINE ITEM 154981

## (undated) DEVICE — SEE L#120831

## (undated) DEVICE — BLADE SURG CARBON STEEL #10

## (undated) DEVICE — WIPE MICRO TIP

## (undated) DEVICE — SYR 10CC LUER LOCK

## (undated) DEVICE — GLOVE SURGICAL LATEX SZ 7

## (undated) DEVICE — TOWEL OR DISP STRL BLUE 4/PK

## (undated) DEVICE — DRAPE HALF SURGICAL 40X58IN

## (undated) DEVICE — Device

## (undated) DEVICE — BLADE SURG #15 CARBON STEEL